# Patient Record
Sex: FEMALE | Race: BLACK OR AFRICAN AMERICAN | NOT HISPANIC OR LATINO | Employment: PART TIME | URBAN - METROPOLITAN AREA
[De-identification: names, ages, dates, MRNs, and addresses within clinical notes are randomized per-mention and may not be internally consistent; named-entity substitution may affect disease eponyms.]

---

## 2017-03-22 ENCOUNTER — OFFICE VISIT (OUTPATIENT)
Dept: INTERNAL MEDICINE | Facility: CLINIC | Age: 22
End: 2017-03-22
Payer: COMMERCIAL

## 2017-03-22 ENCOUNTER — LAB VISIT (OUTPATIENT)
Dept: LAB | Facility: HOSPITAL | Age: 22
End: 2017-03-22
Attending: FAMILY MEDICINE
Payer: COMMERCIAL

## 2017-03-22 VITALS
WEIGHT: 179.44 LBS | DIASTOLIC BLOOD PRESSURE: 82 MMHG | HEART RATE: 74 BPM | SYSTOLIC BLOOD PRESSURE: 120 MMHG | HEIGHT: 64 IN | BODY MASS INDEX: 30.63 KG/M2 | OXYGEN SATURATION: 99 %

## 2017-03-22 DIAGNOSIS — Z13.21 ENCOUNTER FOR VITAMIN DEFICIENCY SCREENING: ICD-10-CM

## 2017-03-22 DIAGNOSIS — R06.2 WHEEZING: ICD-10-CM

## 2017-03-22 DIAGNOSIS — H93.8X2 ABNORMAL EAR SENSATION, LEFT: ICD-10-CM

## 2017-03-22 DIAGNOSIS — L02.91 ABSCESS: ICD-10-CM

## 2017-03-22 DIAGNOSIS — Z00.00 ANNUAL PHYSICAL EXAM: Primary | ICD-10-CM

## 2017-03-22 DIAGNOSIS — Z11.3 SCREENING FOR STD (SEXUALLY TRANSMITTED DISEASE): ICD-10-CM

## 2017-03-22 DIAGNOSIS — L20.82 FLEXURAL ECZEMA: ICD-10-CM

## 2017-03-22 DIAGNOSIS — J45.20 ASTHMA, MILD INTERMITTENT, WELL-CONTROLLED: ICD-10-CM

## 2017-03-22 DIAGNOSIS — Z13.220 SCREENING, LIPID: ICD-10-CM

## 2017-03-22 DIAGNOSIS — Z00.00 ANNUAL PHYSICAL EXAM: ICD-10-CM

## 2017-03-22 LAB
25(OH)D3+25(OH)D2 SERPL-MCNC: 15 NG/ML
ALBUMIN SERPL BCP-MCNC: 3.6 G/DL
ALP SERPL-CCNC: 96 U/L
ALT SERPL W/O P-5'-P-CCNC: 13 U/L
ANION GAP SERPL CALC-SCNC: 10 MMOL/L
AST SERPL-CCNC: 15 U/L
BASOPHILS # BLD AUTO: 0.05 K/UL
BASOPHILS NFR BLD: 0.5 %
BILIRUB SERPL-MCNC: 0.3 MG/DL
BUN SERPL-MCNC: 12 MG/DL
CALCIUM SERPL-MCNC: 9.1 MG/DL
CHLORIDE SERPL-SCNC: 108 MMOL/L
CHOLEST/HDLC SERPL: 3.8 {RATIO}
CO2 SERPL-SCNC: 23 MMOL/L
CREAT SERPL-MCNC: 0.7 MG/DL
DIFFERENTIAL METHOD: ABNORMAL
EOSINOPHIL # BLD AUTO: 0.3 K/UL
EOSINOPHIL NFR BLD: 3.1 %
ERYTHROCYTE [DISTWIDTH] IN BLOOD BY AUTOMATED COUNT: 16.7 %
EST. GFR  (AFRICAN AMERICAN): >60 ML/MIN/1.73 M^2
EST. GFR  (NON AFRICAN AMERICAN): >60 ML/MIN/1.73 M^2
GLUCOSE SERPL-MCNC: 78 MG/DL
HCT VFR BLD AUTO: 36.7 %
HDL/CHOLESTEROL RATIO: 26.4 %
HDLC SERPL-MCNC: 140 MG/DL
HDLC SERPL-MCNC: 37 MG/DL
HGB BLD-MCNC: 11.6 G/DL
LDLC SERPL CALC-MCNC: 89 MG/DL
LYMPHOCYTES # BLD AUTO: 2.7 K/UL
LYMPHOCYTES NFR BLD: 27.2 %
MCH RBC QN AUTO: 23.6 PG
MCHC RBC AUTO-ENTMCNC: 31.6 %
MCV RBC AUTO: 75 FL
MONOCYTES # BLD AUTO: 0.4 K/UL
MONOCYTES NFR BLD: 4.4 %
NEUTROPHILS # BLD AUTO: 6.3 K/UL
NEUTROPHILS NFR BLD: 64.6 %
NONHDLC SERPL-MCNC: 103 MG/DL
PLATELET # BLD AUTO: 283 K/UL
PMV BLD AUTO: 12.1 FL
POTASSIUM SERPL-SCNC: 3.7 MMOL/L
PROT SERPL-MCNC: 7.9 G/DL
RBC # BLD AUTO: 4.92 M/UL
RPR SER QL: NORMAL
SODIUM SERPL-SCNC: 141 MMOL/L
TRIGL SERPL-MCNC: 70 MG/DL
TSH SERPL DL<=0.005 MIU/L-ACNC: 1 UIU/ML
WBC # BLD AUTO: 9.77 K/UL

## 2017-03-22 PROCEDURE — 86592 SYPHILIS TEST NON-TREP QUAL: CPT

## 2017-03-22 PROCEDURE — 86703 HIV-1/HIV-2 1 RESULT ANTBDY: CPT

## 2017-03-22 PROCEDURE — 84443 ASSAY THYROID STIM HORMONE: CPT

## 2017-03-22 PROCEDURE — 36415 COLL VENOUS BLD VENIPUNCTURE: CPT

## 2017-03-22 PROCEDURE — 80053 COMPREHEN METABOLIC PANEL: CPT

## 2017-03-22 PROCEDURE — 99999 PR PBB SHADOW E&M-EST. PATIENT-LVL V: CPT | Mod: PBBFAC,,, | Performed by: FAMILY MEDICINE

## 2017-03-22 PROCEDURE — 80061 LIPID PANEL: CPT

## 2017-03-22 PROCEDURE — 99395 PREV VISIT EST AGE 18-39: CPT | Mod: S$GLB,,, | Performed by: FAMILY MEDICINE

## 2017-03-22 PROCEDURE — 80074 ACUTE HEPATITIS PANEL: CPT

## 2017-03-22 PROCEDURE — 83036 HEMOGLOBIN GLYCOSYLATED A1C: CPT

## 2017-03-22 PROCEDURE — 82306 VITAMIN D 25 HYDROXY: CPT

## 2017-03-22 PROCEDURE — 85025 COMPLETE CBC W/AUTO DIFF WBC: CPT

## 2017-03-22 RX ORDER — ALBUTEROL SULFATE 90 UG/1
2 AEROSOL, METERED RESPIRATORY (INHALATION) EVERY 4 HOURS PRN
Qty: 1 INHALER | Refills: 0 | Status: SHIPPED | OUTPATIENT
Start: 2017-03-22 | End: 2021-01-04 | Stop reason: SDUPTHER

## 2017-03-22 RX ORDER — SULFAMETHOXAZOLE AND TRIMETHOPRIM 800; 160 MG/1; MG/1
1 TABLET ORAL 2 TIMES DAILY
Qty: 14 TABLET | Refills: 0 | Status: SHIPPED | OUTPATIENT
Start: 2017-03-22 | End: 2017-03-29

## 2017-03-22 NOTE — MR AVS SNAPSHOT
[facility]  [address]  [place] [address]  Phone: [phone]  Fax: [phone]                  Carline Iqbal   3/22/2017 9:00 AM   Office Visit    Description:  Female : 1995   Provider:  Jessica Anaya MD   Department:  [facility]           Reason for Visit     Abscess           Diagnoses this Visit        Comments    Annual physical exam    -  Primary     Wheezing         Asthma, mild intermittent, well-controlled         Flexural eczema         Screening for STD (sexually transmitted disease)         Screening, lipid         Encounter for vitamin deficiency screening         Abnormal ear sensation, left         Abscess                To Do List           Goals (5 Years of Data)     None      Follow-Up and Disposition     Return in about 1 year (around 3/22/2018), or if symptoms worsen or fail to improve.       These Medications        Disp Refills Start End    albuterol 90 mcg/actuation inhaler 1 Inhaler 0 3/22/2017     Inhale 2 puffs into the lungs every 4 (four) hours as needed for Wheezing. - Inhalation    Pharmacy: [place] [id number] Ph #: [phone]       sulfamethoxazole-trimethoprim 800-160mg (BACTRIM DS) 800-160 mg Tab 14 tablet 0 3/22/2017 3/29/2017    Take 1 tablet by mouth 2 (two) times daily. - Oral    Pharmacy: [place] [id number] Ph #: [phone]         [facility]     [facility] On [facility] Nurse Care Line -  Assistance  Registered nurses in the [facility] provide clinical advisement, health education, appointment booking, and other advisory services.  Call for this free service at [phone].             Medications           Message regarding Medications     Verify the changes and/or additions to your medication regime listed below are the same as discussed with your clinician today.  If any of these changes or additions are  "incorrect, please notify your healthcare provider.        START taking these NEW medications        Refills    sulfamethoxazole-trimethoprim 800-160mg (BACTRIM DS) 800-160 mg Tab 0    Sig: Take 1 tablet by mouth 2 (two) times daily.    Class: Normal    Route: Oral      STOP taking these medications     urea 20 % Crea Apply 1 application topically 3 (three) times daily.    hydrocodone-acetaminophen 5-325mg (NORCO) 5-325 mg per tablet Take 1 tablet by mouth every 6 (six) hours as needed for Pain.           Verify that the below list of medications is an accurate representation of the medications you are currently taking.  If none reported, the list may be blank. If incorrect, please contact your healthcare provider. Carry this list with you in case of emergency.           Current Medications     clobetasol (TEMOVATE) 0.05 % cream Apply topically 2 (two) times daily.    albuterol 90 mcg/actuation inhaler Inhale 2 puffs into the lungs every 4 (four) hours as needed for Wheezing.    sulfamethoxazole-trimethoprim 800-160mg (BACTRIM DS) 800-160 mg Tab Take 1 tablet by mouth 2 (two) times daily.           Clinical Reference Information           Your Vitals Were     BP Pulse Height Weight Last Period SpO2    120/82 74 5' 4" (1.626 m) 81.4 kg (179 lb 7.3 oz) 02/13/2017 (Approximate) 99%    BMI                30.8 kg/m2          Blood Pressure          Most Recent Value    BP  120/82      Allergies as of 3/22/2017     No Known Allergies      Immunizations Administered on Date of Encounter - 3/22/2017     None      Orders Placed During Today's Visit      Normal Orders This Visit    Ambulatory consult to ENT     Ambulatory consult to Gynecology     Ambulatory Referral to Dermatology     NEBULIZER FOR HOME USE     Future Labs/Procedures Expected by Expires    CBC auto differential  3/22/2017 5/21/2018    Comprehensive metabolic panel  3/22/2017 5/21/2018    Hepatitis panel, acute  3/22/2017 5/21/2018    HIV-1 and HIV-2 " antibodies  3/22/2017 5/21/2018    Lipid panel  3/22/2017 5/21/2018    RPR  3/22/2017 5/21/2018    TSH  3/22/2017 5/21/2018    Vitamin D  3/22/2017 5/21/2018    Hemoglobin A1c  2/20/2018 (Approximate) 5/21/2018    Complete PFT w/ bronchodilator  As directed 3/22/2018      Instructions             Language Assistance Services     ATTENTION: Language assistance services are available, free of charge. Please call [phone].      ATENCIÓN: Si habla español, tiene a rodriguez disposición servicios gratuitos de asistencia lingüística. Llame al [phone].     CHÚ Ý: N?u b?n nói Ti?ng Vi?t, có các d?ch v? h? tr? ngôn ng? mi?n phí dành cho b?n. G?i s? [phone].         [facility] complies with applicable Federal civil rights laws and does not discriminate on the basis of race, color, national origin, age, disability, or sex.

## 2017-03-22 NOTE — PROGRESS NOTES
"Subjective:       Patient ID: Carline Iqbal is a 22 y.o. female.    Chief Complaint: Abscess (left upper thigh)    HPI 21 y/o female with hx of pilonidal cyst s/p excision 5/2017, mild intermittent asthma, eczema, is here for annual exam.  She presents with L upper thigh lesion, has been there on and off for over a year, worse after shaving, sometimes gets bigger and drains whitish fluid.  No recent antibiotic use.  Denies f/n/v/d/constipation/cp/sob/urinary sx.  Sleeping ok, appetite ok, mood ok.  GYN: cycles have been irregular recently, never had a pap, would like gyn here  Asthma: uses albuterol mainly in winter, needs refill  Shots utd    Review of Systems   Constitutional: Negative for activity change, appetite change, fatigue and fever.   HENT: Negative for congestion, postnasal drip, rhinorrhea and sinus pressure.         L ear whooshing sound   Respiratory: Negative for cough and shortness of breath.    Cardiovascular: Negative for chest pain, palpitations and leg swelling.   Gastrointestinal: Negative for constipation, diarrhea, nausea and vomiting.   Genitourinary: Negative for difficulty urinating and dysuria.   Skin: Positive for wound. Negative for rash.   Neurological: Negative for dizziness and light-headedness.   Psychiatric/Behavioral: Negative for sleep disturbance.       Objective:      /82  Pulse 74  Ht 5' 4" (1.626 m)  Wt 81.4 kg (179 lb 7.3 oz)  LMP 02/13/2017 (Approximate)  SpO2 99%  BMI 30.8 kg/m2  Physical Exam   Constitutional: She appears well-developed and well-nourished.   HENT:   Head: Normocephalic and atraumatic.   Mouth/Throat: No oropharyngeal exudate.   Dull tm bilaterallay   Neck: Normal range of motion. Neck supple. No thyromegaly present.   Cardiovascular: Normal rate, regular rhythm and normal heart sounds.    Pulmonary/Chest: Effort normal and breath sounds normal. No respiratory distress.   Abdominal: Soft. Bowel sounds are normal. She exhibits no " distension. There is no tenderness.   Musculoskeletal: She exhibits no edema.   Lymphadenopathy:     She has no cervical adenopathy.   Neurological: She is alert.   Skin: Skin is warm and dry.   L upper thigh with small indurated lesion with surrounding hyperpigmentation, no tenderness or erythema   Psychiatric: She has a normal mood and affect.   Nursing note and vitals reviewed.      Assessment:       1. Annual physical exam    2. Wheezing    3. Asthma, mild intermittent, well-controlled    4. Flexural eczema    5. Screening for STD (sexually transmitted disease)    6. Screening, lipid    7. Encounter for vitamin deficiency screening    8. Abnormal ear sensation, left    9. Abscess        Plan:   Carline was seen today for abscess.    Diagnoses and all orders for this visit:    Annual physical exam  -     Hemoglobin A1c; Future  -     CBC auto differential; Future  -     Comprehensive metabolic panel; Future  -     TSH; Future  -     Ambulatory consult to Gynecology    Wheezing    Asthma, mild intermittent, well-controlled  -     albuterol 90 mcg/actuation inhaler; Inhale 2 puffs into the lungs every 4 (four) hours as needed for Wheezing.  -     NEBULIZER FOR HOME USE  -     Complete PFT w/ bronchodilator; Future    Flexural eczema    Screening for STD (sexually transmitted disease)  -     Cancel: C. trachomatis/N. gonorrhoeae by AMP DNA Urine  -     Hepatitis panel, acute; Future  -     HIV-1 and HIV-2 antibodies; Future  -     RPR; Future    Screening, lipid  -     Lipid panel; Future    Encounter for vitamin deficiency screening  -     Vitamin D; Future    Abnormal ear sensation, left  -     Ambulatory consult to ENT    Abscess  -     sulfamethoxazole-trimethoprim 800-160mg (BACTRIM DS) 800-160 mg Tab; Take 1 tablet by mouth 2 (two) times daily.  -     Ambulatory Referral to Dermatology

## 2017-03-23 LAB
ESTIMATED AVG GLUCOSE: 105 MG/DL
HAV IGM SERPL QL IA: NEGATIVE
HBA1C MFR BLD HPLC: 5.3 %
HBV CORE IGM SERPL QL IA: NEGATIVE
HBV SURFACE AG SERPL QL IA: NEGATIVE
HCV AB SERPL QL IA: NEGATIVE
HIV 1+2 AB+HIV1 P24 AG SERPL QL IA: NEGATIVE

## 2017-03-25 ENCOUNTER — TELEPHONE (OUTPATIENT)
Dept: INTERNAL MEDICINE | Facility: CLINIC | Age: 22
End: 2017-03-25

## 2017-03-25 DIAGNOSIS — D64.9 ANEMIA, UNSPECIFIED TYPE: Primary | ICD-10-CM

## 2017-03-27 ENCOUNTER — HOSPITAL ENCOUNTER (OUTPATIENT)
Dept: PULMONOLOGY | Facility: CLINIC | Age: 22
Discharge: HOME | End: 2017-03-27
Payer: COMMERCIAL

## 2017-03-27 DIAGNOSIS — J45.20 ASTHMA, MILD INTERMITTENT, WELL-CONTROLLED: ICD-10-CM

## 2017-03-27 LAB
POST FEV1 FVC: 0.85
POST FEV1: 2.66
POST FVC: 3.12
PRE FEV1 FVC: 82
PRE FEV1: 2.5
PRE FVC: 3.04
PREDICTED FEV1 FVC: 87
PREDICTED FEV1: 3.16
PREDICTED FVC: 3.65

## 2017-03-27 PROCEDURE — 94060 EVALUATION OF WHEEZING: CPT | Mod: S$GLB,,, | Performed by: INTERNAL MEDICINE

## 2017-03-27 PROCEDURE — 94729 DIFFUSING CAPACITY: CPT | Mod: S$GLB,,, | Performed by: INTERNAL MEDICINE

## 2017-04-01 ENCOUNTER — PATIENT MESSAGE (OUTPATIENT)
Dept: INTERNAL MEDICINE | Facility: CLINIC | Age: 22
End: 2017-04-01

## 2017-04-04 ENCOUNTER — TELEPHONE (OUTPATIENT)
Dept: INTERNAL MEDICINE | Facility: CLINIC | Age: 22
End: 2017-04-04

## 2017-04-04 DIAGNOSIS — Z11.3 SCREENING FOR STD (SEXUALLY TRANSMITTED DISEASE): Primary | ICD-10-CM

## 2017-04-05 ENCOUNTER — OFFICE VISIT (OUTPATIENT)
Dept: OBSTETRICS AND GYNECOLOGY | Facility: CLINIC | Age: 22
End: 2017-04-05
Payer: COMMERCIAL

## 2017-04-05 VITALS
SYSTOLIC BLOOD PRESSURE: 132 MMHG | WEIGHT: 177.94 LBS | HEIGHT: 64 IN | DIASTOLIC BLOOD PRESSURE: 84 MMHG | BODY MASS INDEX: 30.38 KG/M2

## 2017-04-05 DIAGNOSIS — Z01.419 ENCOUNTER FOR GYNECOLOGICAL EXAMINATION WITHOUT ABNORMAL FINDING: Primary | ICD-10-CM

## 2017-04-05 DIAGNOSIS — Z12.4 PAP SMEAR FOR CERVICAL CANCER SCREENING: ICD-10-CM

## 2017-04-05 DIAGNOSIS — Z30.09 GENERAL COUNSELING AND ADVICE ON FEMALE CONTRACEPTION: ICD-10-CM

## 2017-04-05 DIAGNOSIS — Z11.3 VENEREAL DISEASE SCREENING: ICD-10-CM

## 2017-04-05 DIAGNOSIS — L30.9 ECZEMA: ICD-10-CM

## 2017-04-05 LAB
CANDIDA RRNA VAG QL PROBE: NEGATIVE
G VAGINALIS RRNA GENITAL QL PROBE: POSITIVE
T VAGINALIS RRNA GENITAL QL PROBE: NEGATIVE

## 2017-04-05 PROCEDURE — 99999 PR PBB SHADOW E&M-EST. PATIENT-LVL III: CPT | Mod: PBBFAC,,, | Performed by: OBSTETRICS & GYNECOLOGY

## 2017-04-05 PROCEDURE — 99385 PREV VISIT NEW AGE 18-39: CPT | Mod: S$GLB,,, | Performed by: OBSTETRICS & GYNECOLOGY

## 2017-04-05 PROCEDURE — 87591 N.GONORRHOEAE DNA AMP PROB: CPT

## 2017-04-05 PROCEDURE — 88175 CYTOPATH C/V AUTO FLUID REDO: CPT

## 2017-04-05 PROCEDURE — 87480 CANDIDA DNA DIR PROBE: CPT

## 2017-04-05 RX ORDER — CLOBETASOL PROPIONATE 0.5 MG/G
CREAM TOPICAL
Refills: 0 | OUTPATIENT
Start: 2017-04-05

## 2017-04-05 NOTE — PROGRESS NOTES
Subjective:       Patient ID: Carline Iqbal is a 22 y.o. female.    Chief Complaint:  Well Woman      History of Present Illness  HPI  Carline Iqbal is a 22 y.o. female  NEW TO ME here for her FIRST GYN exam.  She reports that she did have the Gardasil vaccine at age 11.  She describes her periods as regular, normal flow, lasting 5 days.   Denies break through bleeding.   Denies vaginal itching or irritation.  Reports vaginal discharge.  She is sexually active. She has had 2 partners during the past year; only became sexually active at age 21;and(has been with one partner since 2016) .  She uses Withdrawal for contraception.   History of abnormal pap: No  Last Pap: patient has never had a pap test  Last MMG: None  Last Colonoscopy:  None  Denies domestic violence. She does feel safe at home.     Past Medical History:   Diagnosis Date    Pilonidal cyst 2016     Past Surgical History:   Procedure Laterality Date    pilonidal cyst removal      TYMPANOSTOMY TUBE PLACEMENT Bilateral      Social History     Social History    Marital status: Single     Spouse name: N/A    Number of children: N/A    Years of education: N/A     Occupational History    [employer]      Social History Main Topics    Smoking status: Never Smoker    Smokeless tobacco: Never Used    Alcohol use 0.0 oz/week     0 Standard drinks or equivalent per week      Comment: seldomly    Drug use: No    Sexual activity: Yes     Partners: Male     Birth control/ protection: None, Coitus interruptus      Comment: Sexually active since age 21, current partner since 2016     Other Topics Concern    Not on file     Social History Narrative     Family History   Problem Relation Age of Onset    Diabetes Maternal Grandmother     Breast cancer Maternal Grandmother 28    Stroke Maternal Grandfather     Hypertension Mother     Anesthesia problems Neg Hx     Colon cancer Neg Hx     Ovarian cancer Neg Hx   "    OB History      Para Term  AB TAB SAB Ectopic Multiple Living    0 0 0 0 0 0 0 0 0 0          /84  Ht 5' 4" (1.626 m)  Wt 80.7 kg (177 lb 14.6 oz)  LMP 2017  BMI 30.54 kg/m2        GYN & OB History  Patient's last menstrual period was 2017.   Date of Last Pap: No result found    OB History    Para Term  AB SAB TAB Ectopic Multiple Living   0 0 0 0 0 0 0 0 0 0             Review of Systems  Review of Systems   Constitutional: Negative for activity change, appetite change, fatigue and unexpected weight change.   HENT: Negative.    Eyes: Negative for visual disturbance.   Respiratory: Negative for shortness of breath and wheezing.    Cardiovascular: Negative for chest pain, palpitations and leg swelling.   Gastrointestinal: Negative for abdominal pain, bloating and blood in stool.   Endocrine: Negative for diabetes and hair loss.   Genitourinary: Positive for vaginal discharge. Negative for decreased libido, dyspareunia, menorrhagia and menstrual problem.   Musculoskeletal: Negative for back pain and joint swelling.   Skin:  Negative for no acne and hair changes.   Neurological: Negative for headaches.   Hematological: Does not bruise/bleed easily.   Psychiatric/Behavioral: Negative for depression and sleep disturbance. The patient is not nervous/anxious.    Breast: Negative for breast pain and nipple discharge          Objective:    Physical Exam:   Constitutional: She is oriented to person, place, and time. She appears well-developed and well-nourished.    HENT:   Head: Normocephalic and atraumatic.    Eyes: EOM are normal. Pupils are equal, round, and reactive to light.    Neck: Normal range of motion. Neck supple.    Cardiovascular: Normal rate and regular rhythm.     Pulmonary/Chest: Effort normal and breath sounds normal.   BREASTS: Symmetrical, no skin changes or visible lesions.  No palpable masses, nipple discharge bilaterally.          Abdominal: Soft. " Bowel sounds are normal.     Genitourinary: Vagina normal. Pelvic exam was performed with patient supine.   Genitourinary Comments: PELVIC: Normal external genitalia without lesions.  Normal hair distribution.  Adequate perineal body, normal urethral meatus.  Vagina moist and well rugated without lesions or discharge.  Cervix pink, without lesions, discharge or tenderness.  No significant cystocele or rectocele.  Bimanual exam shows uterus to be normal size, regular, mobile and nontender.  Adnexa without masses or tenderness.               Musculoskeletal: Normal range of motion and moves all extremeties.       Neurological: She is alert and oriented to person, place, and time.    Skin: Skin is warm and dry.    Psychiatric: She has a normal mood and affect.          Assessment:        1. Encounter for gynecological examination without abnormal finding    2. Pap smear for cervical cancer screening    3. Venereal disease screening    4. General counseling and advice on female contraception                Plan:      1. Encounter for gynecological examination without abnormal finding  COUNSELING:  The patient was counseled today on regular weight bearing exercise. The patient was also counseled today on ACS PAP guidelines, with recommendations for yearly pelvic exams unless their uterus, cervix, and ovaries were removed for benign reasons; in that case, examinations every 3-5 years are recommended. The patient was also counseled regarding monthly breast self-examination, routine STD screening for at-risk populations, prophylactic immunizations for transmitted infections such as HPV, Pertussis, or Influenza as appropriate, and yearly mammograms when indicated by ACS guidelines. She was advised to see her primary care physician for all other health maintenance.   FOLLOW-UP with me for next routine visit.         2. Pap smear for cervical cancer screening    - Liquid-based pap smear, screening    3. Venereal disease  screening    - C. trachomatis/N. gonorrhoeae by AMP DNA Cervix  - Vaginosis Screen by DNA Probe    4. General counseling and advice on female contraception  Patient was counseled today on contraceptive options--Pills, Depo-Provera, IUD, Nexplanon, & Nuva Ring. We discussed the advantages and disadvantages of each. We discussed the continued use of condoms to prevent STDs. The patient elected to use ?uncertain, will call back with a decision. The session lasted approximately 25 minutes, greater than 50% was counseling. All her questions were answered.         Return in about 1 year (around 4/5/2018).

## 2017-04-05 NOTE — LETTER
April 5, 2017      Jessica Anaya MD  [address]  [place] [address]           [facility] 5th [facility]  [address]  [place] [address]  Phone: [phone]          Patient: Carline Iqbal   MR Number: [id number]   YOB: 1995   Date of Visit: 4/5/2017       Dear Dr. Jessica Anaya:    Thank you for referring Carline Iqbal to me for evaluation. Attached you will find relevant portions of my assessment and plan of care.    If you have questions, please do not hesitate to call me. I look forward to following Carline Iqbal along with you.    Sincerely,    Roro Sanders MD    Enclosure  CC:  No Recipients    If you would like to receive this communication electronically, please contact [email or web address] or [phone] to request more information on [facility] access.    For providers and/or their staff who would like to refer a patient to [facility], please contact us through our one-stop-shop provider referral line, [facility], at [phone].    If you feel you have received this communication in error or would no longer like to receive these types of communications, please e-mail [email or web address]

## 2017-04-06 ENCOUNTER — TELEPHONE (OUTPATIENT)
Dept: OBSTETRICS AND GYNECOLOGY | Facility: CLINIC | Age: 22
End: 2017-04-06

## 2017-04-06 ENCOUNTER — PATIENT MESSAGE (OUTPATIENT)
Dept: OBSTETRICS AND GYNECOLOGY | Facility: CLINIC | Age: 22
End: 2017-04-06

## 2017-04-06 DIAGNOSIS — B96.89 BACTERIAL VAGINOSIS: ICD-10-CM

## 2017-04-06 DIAGNOSIS — L30.9 ECZEMA: ICD-10-CM

## 2017-04-06 DIAGNOSIS — N76.0 BACTERIAL VAGINOSIS: ICD-10-CM

## 2017-04-06 DIAGNOSIS — A74.9 CHLAMYDIA: Primary | ICD-10-CM

## 2017-04-06 LAB
C TRACH DNA SPEC QL NAA+PROBE: DETECTED
N GONORRHOEA DNA SPEC QL NAA+PROBE: NOT DETECTED

## 2017-04-06 RX ORDER — CLOBETASOL PROPIONATE 0.5 MG/G
CREAM TOPICAL 2 TIMES DAILY
Qty: 60 G | Refills: 0 | Status: SHIPPED | OUTPATIENT
Start: 2017-04-06 | End: 2018-08-27 | Stop reason: SDUPTHER

## 2017-04-06 RX ORDER — AZITHROMYCIN 500 MG/1
1000 TABLET, FILM COATED ORAL DAILY
Qty: 2 TABLET | Refills: 0 | Status: SHIPPED | OUTPATIENT
Start: 2017-04-06 | End: 2017-04-07

## 2017-04-06 RX ORDER — METRONIDAZOLE 500 MG/1
500 TABLET ORAL 2 TIMES DAILY WITH MEALS
Qty: 10 TABLET | Refills: 0 | Status: SHIPPED | OUTPATIENT
Start: 2017-04-06 | End: 2017-04-11

## 2017-04-06 NOTE — TELEPHONE ENCOUNTER
"Attempted to reach patient re: + Chlamydia and + BV.   "Voicemail box not set up yet"    Will send information Re;on patient portal.  "

## 2017-04-07 ENCOUNTER — PATIENT MESSAGE (OUTPATIENT)
Dept: OBSTETRICS AND GYNECOLOGY | Facility: CLINIC | Age: 22
End: 2017-04-07

## 2017-04-08 ENCOUNTER — PATIENT MESSAGE (OUTPATIENT)
Dept: OBSTETRICS AND GYNECOLOGY | Facility: CLINIC | Age: 22
End: 2017-04-08

## 2017-04-10 ENCOUNTER — TELEPHONE (OUTPATIENT)
Dept: OBSTETRICS AND GYNECOLOGY | Facility: CLINIC | Age: 22
End: 2017-04-10

## 2017-04-10 NOTE — TELEPHONE ENCOUNTER
"Called in prescription for Zithromax 500 MG tablets (disp: 2, refills: 0 with instructions: "Take 2 tablets (1,000 mg total) by mouth once daily.")  to pt's partner's [facility] in [place], address: [address] [place] [address] or [place] [address]  Phone number: [phone]. Called pt, no answer. Sent pt message through [facility] requesting a call back.  "

## 2017-04-13 ENCOUNTER — TELEPHONE (OUTPATIENT)
Dept: OBSTETRICS AND GYNECOLOGY | Facility: CLINIC | Age: 22
End: 2017-04-13

## 2017-04-13 NOTE — TELEPHONE ENCOUNTER
Scheduled repeat chlamydia testing appt for pt 2 months post zithromax treatment. Appt was made for 6/5/2017 at 8:30 AM at [facility]. Pt communicated understanding.

## 2017-05-09 ENCOUNTER — OFFICE VISIT (OUTPATIENT)
Dept: INTERNAL MEDICINE | Facility: CLINIC | Age: 22
End: 2017-05-09
Payer: COMMERCIAL

## 2017-05-09 VITALS
BODY MASS INDEX: 30.05 KG/M2 | OXYGEN SATURATION: 98 % | WEIGHT: 176 LBS | TEMPERATURE: 99 F | HEIGHT: 64 IN | SYSTOLIC BLOOD PRESSURE: 100 MMHG | HEART RATE: 58 BPM | DIASTOLIC BLOOD PRESSURE: 80 MMHG

## 2017-05-09 DIAGNOSIS — N76.0 ACUTE VAGINITIS: Primary | ICD-10-CM

## 2017-05-09 PROCEDURE — 1160F RVW MEDS BY RX/DR IN RCRD: CPT | Mod: S$GLB,,, | Performed by: INTERNAL MEDICINE

## 2017-05-09 PROCEDURE — 99999 PR PBB SHADOW E&M-EST. PATIENT-LVL III: CPT | Mod: PBBFAC,,, | Performed by: INTERNAL MEDICINE

## 2017-05-09 PROCEDURE — 87591 N.GONORRHOEAE DNA AMP PROB: CPT

## 2017-05-09 PROCEDURE — 99213 OFFICE O/P EST LOW 20 MIN: CPT | Mod: S$GLB,,, | Performed by: INTERNAL MEDICINE

## 2017-05-09 RX ORDER — METRONIDAZOLE 500 MG/1
TABLET ORAL
Qty: 14 TABLET | Refills: 0 | Status: SHIPPED | OUTPATIENT
Start: 2017-05-09 | End: 2017-06-05

## 2017-05-09 NOTE — PROGRESS NOTES
Subjective:       Patient ID: Carline Iqbal is a 22 y.o. female.    Chief Complaint: Vaginal Discharge (milky white discharge x 2 days burning)    HPI   Milky white discharge x 2 days.     Similar to BV that she had seen in past.  Just saw Dr Sanders in April tx with flagyl 500 mg bid.  Chlamydia also detected, tx with azithromycin.   No new sexual partners.   LMP  April 23-29..    Review of Systems   Constitutional: Negative for fever.   Gastrointestinal: Negative for abdominal distention, abdominal pain, nausea and vomiting.   Genitourinary: Positive for vaginal discharge. Negative for vaginal bleeding and vaginal pain.       Objective:      Physical Exam   Constitutional: She is oriented to person, place, and time. She appears well-developed.   Neurological: She is alert and oriented to person, place, and time.   Psychiatric: She has a normal mood and affect. Her behavior is normal.       Assessment:       1. Acute vaginitis                    - recurrent.  Recently tx for chlamydia and BV  Plan:       Carline was seen today for vaginal discharge.    Diagnoses and all orders for this visit:    Acute vaginitis  -     C. trachomatis/N. gonorrhoeae by AMP DNA Urine  -     metronidazole (FLAGYL) 500 MG tablet; 1 tab po bid       Encouraged to start birth control      Avoid EtOH while taking flagyl.

## 2017-05-09 NOTE — MR AVS SNAPSHOT
[facility]  [address]  [facility] [address]  Phone: [phone]  Fax: [phone]                  Carline Iqbal   2017 8:40 AM   Office Visit    Description:  Female : 1995   Provider:  Erin Smiley MD   Department:  [facility]           Reason for Visit     Vaginal Discharge           Diagnoses this Visit        Comments    Acute vaginitis    -  Primary            To Do List           Future Appointments        Provider Department Dept Phone    2017 8:30 AM Roro Sanders MD [facility] - OB/GYN [address] [phone]      Goals (5 Years of Data)     None       These Medications        Disp Refills Start End    metronidazole (FLAGYL) 500 MG tablet 14 tablet 0 2017     1 tab po bid    Pharmacy: [address] #: [phone]         [facility]     [facility] On Call Nurse Care Line -  Assistance  Unless otherwise directed by your provider, please contact [facility], our nurse care line that is available for  assistance.     Registered nurses in the [facility] provide: appointment scheduling, clinical advisement, health education, and other advisory services.  Call: [phone] (toll free)               Medications           Message regarding Medications     Verify the changes and/or additions to your medication regime listed below are the same as discussed with your clinician today.  If any of these changes or additions are incorrect, please notify your healthcare provider.        START taking these NEW medications        Refills    metronidazole (FLAGYL) 500 MG tablet 0    Si tab po bid    Class: Normal           Verify that the below list of medications is an accurate representation of the medications you are currently taking.  If none reported, the list may be blank. If incorrect, please contact your healthcare provider. Carry this list with you  "in case of emergency.           Current Medications     albuterol 90 mcg/actuation inhaler Inhale 2 puffs into the lungs every 4 (four) hours as needed for Wheezing.    clobetasol (TEMOVATE) 0.05 % cream Apply topically 2 (two) times daily.    metronidazole (FLAGYL) 500 MG tablet 1 tab po bid           Clinical Reference Information           Your Vitals Were     BP Pulse Temp Height Weight SpO2    100/80 (BP Location: Left arm, Patient Position: Sitting, BP Method: Manual) 58 98.7 °F (37.1 °C) (Oral) 5' 4" (1.626 m) 79.8 kg (176 lb) 98%    BMI                30.21 kg/m2          Blood Pressure          Most Recent Value    BP  100/80      Allergies as of 5/9/2017     No Known Allergies      Immunizations Administered on Date of Encounter - 5/9/2017     None      Orders Placed During Today's Visit      Normal Orders This Visit    C. trachomatis/N. gonorrhoeae by AMP DNA Urine       Language Assistance Services     ATTENTION: Language assistance services are available, free of charge. Please call [phone].      ATENCIÓN: Si habla español, tiene a rodriguez disposición servicios gratuitos de asistencia lingüística. Llame al [phone].     EUNICE Ý: N?u b?n nói Ti?ng Vi?t, có các d?ch v? h? tr? ngôn ng? mi?n phí dành cho b?n. G?i s? [phone].         [facility] complies with applicable Federal civil rights laws and does not discriminate on the basis of race, color, national origin, age, disability, or sex.        "

## 2017-05-10 ENCOUNTER — PATIENT MESSAGE (OUTPATIENT)
Dept: OBSTETRICS AND GYNECOLOGY | Facility: CLINIC | Age: 22
End: 2017-05-10

## 2017-05-10 LAB
C TRACH DNA SPEC QL NAA+PROBE: NOT DETECTED
N GONORRHOEA DNA SPEC QL NAA+PROBE: NOT DETECTED

## 2017-06-05 ENCOUNTER — OFFICE VISIT (OUTPATIENT)
Dept: OBSTETRICS AND GYNECOLOGY | Facility: CLINIC | Age: 22
End: 2017-06-05
Attending: OBSTETRICS & GYNECOLOGY
Payer: COMMERCIAL

## 2017-06-05 VITALS
HEIGHT: 64 IN | SYSTOLIC BLOOD PRESSURE: 114 MMHG | WEIGHT: 169.31 LBS | BODY MASS INDEX: 28.91 KG/M2 | DIASTOLIC BLOOD PRESSURE: 78 MMHG

## 2017-06-05 DIAGNOSIS — Z11.3 VENEREAL DISEASE SCREENING: Primary | ICD-10-CM

## 2017-06-05 LAB
C TRACH DNA SPEC QL NAA+PROBE: NOT DETECTED
N GONORRHOEA DNA SPEC QL NAA+PROBE: NOT DETECTED

## 2017-06-05 PROCEDURE — 99213 OFFICE O/P EST LOW 20 MIN: CPT | Mod: S$GLB,,, | Performed by: OBSTETRICS & GYNECOLOGY

## 2017-06-05 PROCEDURE — 87591 N.GONORRHOEAE DNA AMP PROB: CPT

## 2017-06-05 PROCEDURE — 99999 PR PBB SHADOW E&M-EST. PATIENT-LVL II: CPT | Mod: PBBFAC,,, | Performed by: OBSTETRICS & GYNECOLOGY

## 2017-06-05 NOTE — PROGRESS NOTES
Carline Iqbal is a 22 y.o. female patient  who presents today for follow up test after being treated for chlamydia several months ago. She states that her partner was also treated about one week after her treatment and that they refrained from unprotected sex for about a month following treatment.   She denies pain or discharge at this time.  Patient's last menstrual period was 2017 (exact date).    Past Medical History:   Diagnosis Date    Pilonidal cyst 2016     Past Surgical History:   Procedure Laterality Date    pilonidal cyst removal      TYMPANOSTOMY TUBE PLACEMENT Bilateral      Social History     Social History    Marital status: Single     Spouse name: N/A    Number of children: N/A    Years of education: N/A     Occupational History    [employer]      Social History Main Topics    Smoking status: Never Smoker    Smokeless tobacco: Never Used    Alcohol use 0.0 oz/week      Comment: seldomly    Drug use: No    Sexual activity: Yes     Partners: Male     Birth control/ protection: None, Coitus interruptus      Comment: Sexually active since age 21, current partner since 2016     Other Topics Concern    Not on file     Social History Narrative    No narrative on file     Family History   Problem Relation Age of Onset    Diabetes Maternal Grandmother     Breast cancer Maternal Grandmother 28    Stroke Maternal Grandfather     Hypertension Mother     Anesthesia problems Neg Hx     Colon cancer Neg Hx     Ovarian cancer Neg Hx      OB History      Para Term  AB Living    0 0 0 0 0 0    SAB TAB Ectopic Multiple Live Births    0 0 0 0                 ROS:  GENERAL: Feeling well overall.   SKIN: Denies rash or lesions.   HEAD: Denies head injury or headache.   NODES: Denies enlarged lymph nodes.   CHEST: Denies chest pain or shortness of breath.   CARDIOVASCULAR: Denies palpitations or left sided chest pain.   ABDOMEN: No abdominal pain, nausea,  "vomiting or rectal bleeding.   URINARY: No dysuria, hematuria, or burning on urination.  REPRODUCTIVE: See HPI.   BREASTS: Denies pain, lumps, or nipple discharge.   HEMATOLOGIC: No easy bruisability or excessive bleeding.   MUSCULOSKELETAL: Denies joint pain or swelling.   NEUROLOGIC: Denies syncope or weakness.   PSYCHIATRIC: Denies depression.    /78   Ht 5' 4" (1.626 m)   Wt 76.8 kg (169 lb 5 oz)   LMP 05/27/2017 (Exact Date)   BMI 29.06 kg/m²     PE:   APPEARANCE: Well nourished, well developed, in no acute distress.  ABDOMEN: Soft. No tenderness or masses. No hernias. No CVA tenderness.  VULVA: No lesions. Normal female genitalia.  URETHRAL MEATUS: Normal size and location, no lesions, no prolapse.  URETHRA: No masses, tenderness, prolapse or scarring.  VAGINA: Moist and well rugated, no discharge, no significant cystocele or rectocele.  CERVIX: No lesions and discharge.   UTERUS: Normal size, regular shape, mobile, non-tender, bladder base nontender.  ADNEXA: No masses, tenderness or CDS nodularity.  ANUS PERINEUM: Normal.    PROCEDURES:    1. Venereal disease screening  C. trachomatis/N. gonorrhoeae by AMP DNA Cervix    AND PLAN:      Return in about 1 year (around 6/5/2018).    "

## 2017-06-08 ENCOUNTER — PATIENT MESSAGE (OUTPATIENT)
Dept: OBSTETRICS AND GYNECOLOGY | Facility: CLINIC | Age: 22
End: 2017-06-08

## 2017-08-15 ENCOUNTER — PATIENT MESSAGE (OUTPATIENT)
Dept: INTERNAL MEDICINE | Facility: CLINIC | Age: 22
End: 2017-08-15

## 2017-12-07 ENCOUNTER — OFFICE VISIT (OUTPATIENT)
Dept: INTERNAL MEDICINE | Facility: CLINIC | Age: 22
End: 2017-12-07
Payer: MEDICAID

## 2017-12-07 ENCOUNTER — LAB VISIT (OUTPATIENT)
Dept: LAB | Facility: HOSPITAL | Age: 22
End: 2017-12-07
Attending: FAMILY MEDICINE
Payer: MEDICAID

## 2017-12-07 VITALS
HEART RATE: 96 BPM | OXYGEN SATURATION: 100 % | BODY MASS INDEX: 29.62 KG/M2 | HEIGHT: 64 IN | SYSTOLIC BLOOD PRESSURE: 124 MMHG | WEIGHT: 173.5 LBS | DIASTOLIC BLOOD PRESSURE: 82 MMHG

## 2017-12-07 DIAGNOSIS — N89.8 VAGINAL DISCHARGE: Primary | ICD-10-CM

## 2017-12-07 DIAGNOSIS — Z11.3 SCREENING EXAMINATION FOR STD (SEXUALLY TRANSMITTED DISEASE): ICD-10-CM

## 2017-12-07 DIAGNOSIS — D64.9 ANEMIA, UNSPECIFIED TYPE: ICD-10-CM

## 2017-12-07 DIAGNOSIS — Z11.3 SCREENING FOR STD (SEXUALLY TRANSMITTED DISEASE): ICD-10-CM

## 2017-12-07 LAB
BASOPHILS # BLD AUTO: 0.06 K/UL
BASOPHILS NFR BLD: 0.7 %
C TRACH DNA SPEC QL NAA+PROBE: NOT DETECTED
DIFFERENTIAL METHOD: ABNORMAL
EOSINOPHIL # BLD AUTO: 0.2 K/UL
EOSINOPHIL NFR BLD: 2.4 %
ERYTHROCYTE [DISTWIDTH] IN BLOOD BY AUTOMATED COUNT: 17.1 %
FERRITIN SERPL-MCNC: 20 NG/ML
FOLATE SERPL-MCNC: 7.6 NG/ML
HAV IGM SERPL QL IA: NEGATIVE
HBV CORE IGM SERPL QL IA: NEGATIVE
HBV SURFACE AG SERPL QL IA: NEGATIVE
HCT VFR BLD AUTO: 39.1 %
HCV AB SERPL QL IA: NEGATIVE
HGB BLD-MCNC: 12 G/DL
HIV 1+2 AB+HIV1 P24 AG SERPL QL IA: NEGATIVE
IMM GRANULOCYTES # BLD AUTO: 0.04 K/UL
IMM GRANULOCYTES NFR BLD AUTO: 0.5 %
IRON SERPL-MCNC: 39 UG/DL
LYMPHOCYTES # BLD AUTO: 2.3 K/UL
LYMPHOCYTES NFR BLD: 25.7 %
MCH RBC QN AUTO: 23.5 PG
MCHC RBC AUTO-ENTMCNC: 30.7 G/DL
MCV RBC AUTO: 77 FL
MONOCYTES # BLD AUTO: 0.4 K/UL
MONOCYTES NFR BLD: 4 %
N GONORRHOEA DNA SPEC QL NAA+PROBE: NOT DETECTED
NEUTROPHILS # BLD AUTO: 5.8 K/UL
NEUTROPHILS NFR BLD: 66.7 %
NRBC BLD-RTO: 0 /100 WBC
PLATELET # BLD AUTO: 274 K/UL
PMV BLD AUTO: 12.7 FL
RBC # BLD AUTO: 5.11 M/UL
RETICS/RBC NFR AUTO: 1.5 %
SATURATED IRON: 8 %
TOTAL IRON BINDING CAPACITY: 475 UG/DL
TRANSFERRIN SERPL-MCNC: 321 MG/DL
VIT B12 SERPL-MCNC: 787 PG/ML
WBC # BLD AUTO: 8.74 K/UL

## 2017-12-07 PROCEDURE — 86694 HERPES SIMPLEX NES ANTBDY: CPT | Mod: 59

## 2017-12-07 PROCEDURE — 86694 HERPES SIMPLEX NES ANTBDY: CPT

## 2017-12-07 PROCEDURE — 82728 ASSAY OF FERRITIN: CPT

## 2017-12-07 PROCEDURE — 87591 N.GONORRHOEAE DNA AMP PROB: CPT

## 2017-12-07 PROCEDURE — 99213 OFFICE O/P EST LOW 20 MIN: CPT | Mod: PBBFAC,PN | Performed by: FAMILY MEDICINE

## 2017-12-07 PROCEDURE — 87660 TRICHOMONAS VAGIN DIR PROBE: CPT

## 2017-12-07 PROCEDURE — 86703 HIV-1/HIV-2 1 RESULT ANTBDY: CPT

## 2017-12-07 PROCEDURE — 82607 VITAMIN B-12: CPT

## 2017-12-07 PROCEDURE — 83540 ASSAY OF IRON: CPT

## 2017-12-07 PROCEDURE — 85045 AUTOMATED RETICULOCYTE COUNT: CPT

## 2017-12-07 PROCEDURE — 99214 OFFICE O/P EST MOD 30 MIN: CPT | Mod: S$PBB,,, | Performed by: FAMILY MEDICINE

## 2017-12-07 PROCEDURE — 80074 ACUTE HEPATITIS PANEL: CPT

## 2017-12-07 PROCEDURE — 87480 CANDIDA DNA DIR PROBE: CPT

## 2017-12-07 PROCEDURE — 99999 PR PBB SHADOW E&M-EST. PATIENT-LVL III: CPT | Mod: PBBFAC,,, | Performed by: FAMILY MEDICINE

## 2017-12-07 PROCEDURE — 86592 SYPHILIS TEST NON-TREP QUAL: CPT

## 2017-12-07 PROCEDURE — 85025 COMPLETE CBC W/AUTO DIFF WBC: CPT

## 2017-12-07 PROCEDURE — 82746 ASSAY OF FOLIC ACID SERUM: CPT

## 2017-12-07 NOTE — PROGRESS NOTES
"Subjective:       Patient ID: Carline Iqabl is a 22 y.o. female.    Chief Complaint: Follow-up    HPI   23 y/o female with hx of pilonidal cyst s/p excision 5/2017, mild intermittent asthma, eczema, is here for STD check, she had one partner but hey have just broken up.   She was told she needed to be retested for HSV, she has never had a vaginal outbreak of lesions or any oral lesions, she does have a hx of BV, she currently has mild vaginal discharge clear, no itching or burning, denies f/n/v/d/constipation/urinary sx.    Review of Systems   Constitutional: Negative for activity change, appetite change, fatigue and fever.   Gastrointestinal: Negative for constipation, diarrhea, nausea and vomiting.   Genitourinary: Positive for vaginal discharge. Negative for difficulty urinating, dysuria, frequency, genital sores, hematuria, vaginal bleeding and vaginal pain.   Skin: Positive for wound. Negative for rash.   Neurological: Negative for dizziness and light-headedness.   Psychiatric/Behavioral: Negative for sleep disturbance.       Objective:      /82   Pulse 96   Ht 5' 4" (1.626 m)   Wt 78.7 kg (173 lb 8 oz)   LMP 12/02/2017   SpO2 100%   BMI 29.78 kg/m²   Physical Exam   Constitutional: She appears well-developed and well-nourished.   HENT:   Head: Normocephalic and atraumatic.   Mouth/Throat: No oropharyngeal exudate.   Neck: Normal range of motion. Neck supple. No thyromegaly present.   Cardiovascular: Normal rate, regular rhythm and normal heart sounds.    Pulmonary/Chest: Effort normal and breath sounds normal. No respiratory distress.   Abdominal: Soft. Bowel sounds are normal. She exhibits no distension. There is no tenderness.   Musculoskeletal: She exhibits no edema.   Lymphadenopathy:     She has no cervical adenopathy.   Neurological: She is alert.   Skin: Skin is warm and dry.   Psychiatric: She has a normal mood and affect.   Nursing note and vitals reviewed.      Assessment:       1. " Vaginal discharge    2. Screening examination for STD (sexually transmitted disease)        Plan:   Carline was seen today for follow-up.    Diagnoses and all orders for this visit:    Vaginal discharge  -     Vaginosis Screen by DNA Probe    Screening examination for STD (sexually transmitted disease)  -     Vaginosis Screen by DNA Probe  -     RPR; Future  -     HIV-1 and HIV-2 antibodies; Future  -     C. trachomatis/N. gonorrhoeae by AMP DNA Urine  -     Hepatitis panel, acute; Future

## 2017-12-08 LAB
CANDIDA RRNA VAG QL PROBE: NEGATIVE
G VAGINALIS RRNA GENITAL QL PROBE: POSITIVE
HSV AB, IGM BY EIA: REACTIVE
RPR SER QL: NORMAL
T VAGINALIS RRNA GENITAL QL PROBE: NEGATIVE

## 2017-12-10 LAB — HSV AB, IGM BY IFA: NEGATIVE

## 2017-12-11 ENCOUNTER — TELEPHONE (OUTPATIENT)
Dept: INTERNAL MEDICINE | Facility: CLINIC | Age: 22
End: 2017-12-11

## 2017-12-11 DIAGNOSIS — N76.0 BACTERIAL VAGINOSIS: ICD-10-CM

## 2017-12-11 DIAGNOSIS — E61.1 IRON DEFICIENCY: ICD-10-CM

## 2017-12-11 DIAGNOSIS — B96.89 BACTERIAL VAGINOSIS: ICD-10-CM

## 2017-12-11 RX ORDER — METRONIDAZOLE 500 MG/1
500 TABLET ORAL EVERY 12 HOURS
Qty: 14 TABLET | Refills: 0 | Status: SHIPPED | OUTPATIENT
Start: 2017-12-11 | End: 2017-12-18

## 2017-12-11 RX ORDER — FERROUS SULFATE 324(65)MG
325 TABLET, DELAYED RELEASE (ENTERIC COATED) ORAL 3 TIMES DAILY
Qty: 90 TABLET | Refills: 0 | Status: SHIPPED | OUTPATIENT
Start: 2017-12-11 | End: 2018-06-12

## 2017-12-11 NOTE — TELEPHONE ENCOUNTER
"Informed pt of Dr. Anaya advice: "Your vaginal swab was positive for BV, I will go ahead and send the antibiotic to your pharmacy.  Please let me know if you have any questions.  Your blood work looks good overall, you are iron deficient, I will send a supplement to your pharmacy." Pt verbalized understanding and states that she will start the medication. Pt has no further questions or concerns.       "

## 2018-03-06 ENCOUNTER — OFFICE VISIT (OUTPATIENT)
Dept: INTERNAL MEDICINE | Facility: CLINIC | Age: 23
End: 2018-03-06
Payer: MEDICAID

## 2018-03-06 VITALS
BODY MASS INDEX: 29.9 KG/M2 | HEART RATE: 84 BPM | DIASTOLIC BLOOD PRESSURE: 92 MMHG | WEIGHT: 175.13 LBS | HEIGHT: 64 IN | OXYGEN SATURATION: 99 % | SYSTOLIC BLOOD PRESSURE: 126 MMHG

## 2018-03-06 DIAGNOSIS — R07.9 CHEST PAIN, UNSPECIFIED TYPE: ICD-10-CM

## 2018-03-06 DIAGNOSIS — J35.8 TONSILLAR EXUDATE: Primary | ICD-10-CM

## 2018-03-06 DIAGNOSIS — J30.9 ALLERGIC RHINITIS, UNSPECIFIED CHRONICITY, UNSPECIFIED SEASONALITY, UNSPECIFIED TRIGGER: ICD-10-CM

## 2018-03-06 LAB
CTP QC/QA: YES
S PYO RRNA THROAT QL PROBE: NEGATIVE

## 2018-03-06 PROCEDURE — 99999 PR PBB SHADOW E&M-EST. PATIENT-LVL III: CPT | Mod: PBBFAC,,, | Performed by: FAMILY MEDICINE

## 2018-03-06 PROCEDURE — 87070 CULTURE OTHR SPECIMN AEROBIC: CPT

## 2018-03-06 PROCEDURE — 93010 ELECTROCARDIOGRAM REPORT: CPT | Mod: ,,, | Performed by: INTERNAL MEDICINE

## 2018-03-06 PROCEDURE — 99213 OFFICE O/P EST LOW 20 MIN: CPT | Mod: PBBFAC,PN | Performed by: FAMILY MEDICINE

## 2018-03-06 PROCEDURE — 93005 ELECTROCARDIOGRAM TRACING: CPT | Mod: PBBFAC,PN | Performed by: FAMILY MEDICINE

## 2018-03-06 PROCEDURE — 87880 STREP A ASSAY W/OPTIC: CPT | Mod: PBBFAC,PN | Performed by: FAMILY MEDICINE

## 2018-03-06 PROCEDURE — 99214 OFFICE O/P EST MOD 30 MIN: CPT | Mod: S$PBB,,, | Performed by: FAMILY MEDICINE

## 2018-03-06 RX ORDER — PENICILLIN V POTASSIUM 500 MG/1
500 TABLET, FILM COATED ORAL 2 TIMES DAILY
Qty: 20 TABLET | Refills: 0 | Status: SHIPPED | OUTPATIENT
Start: 2018-03-06 | End: 2018-03-16

## 2018-03-06 RX ORDER — NAPROXEN 375 MG/1
375 TABLET ORAL 2 TIMES DAILY
Qty: 30 TABLET | Refills: 0 | Status: SHIPPED | OUTPATIENT
Start: 2018-03-06 | End: 2018-06-12

## 2018-03-06 RX ORDER — CETIRIZINE HYDROCHLORIDE 10 MG/1
10 TABLET ORAL DAILY
Qty: 90 TABLET | Refills: 1 | Status: SHIPPED | OUTPATIENT
Start: 2018-03-06 | End: 2021-02-15

## 2018-03-06 NOTE — PROGRESS NOTES
"Subjective:       Patient ID: Carline Iqbal is a 23 y.o. female.    Chief Complaint: Chest Pain (right side chest pain, intermittent) and Sore Throat    HPI  22 y/o female with hx of pilonidal cyst s/p excision 5/2017, mild intermittent asthma, eczema, iron deficiency is here for chest pain. She reports she has had sternal chest pain for years, but for the past few months she gets pain daily, sternal location, feels achy, comes and goes several times a day, 4/10, worse when she pushes up to get out of bed or does lifting, especially at work, not associated with meals, no accident or injury, has not tried otc meds.  She denies f/n/v/d/constipation/palpitations/sob/urinary sx. She does have some sinus sx, sneezing, denies pnd/sore throat/cough, noticed R sided white spot on tonsil, denies pain with swallowing  Iron def: on supplement intermittently  Asthma: last albuterol was a month ago when she had cold symptoms    Review of Systems   Constitutional: Negative for activity change, appetite change, fatigue and fever.   HENT: Positive for sneezing. Negative for congestion, postnasal drip, sinus pain, sinus pressure, sore throat and trouble swallowing.    Respiratory: Negative for cough and shortness of breath.    Cardiovascular: Positive for chest pain. Negative for palpitations and leg swelling.   Gastrointestinal: Negative for constipation, diarrhea, nausea and vomiting.   Genitourinary: Negative for difficulty urinating and dysuria.   Skin: Negative for rash.   Neurological: Negative for dizziness and light-headedness.   Psychiatric/Behavioral: Negative for sleep disturbance.       Objective:      BP (!) 126/92   Pulse 84   Ht 5' 4" (1.626 m)   Wt 79.5 kg (175 lb 2.5 oz)   LMP 02/23/2018   SpO2 99%   BMI 30.07 kg/m²   Physical Exam   Constitutional: She appears well-developed and well-nourished.   HENT:   Head: Normocephalic and atraumatic.   Mouth/Throat: Uvula is midline. Oropharyngeal exudate present. " Tonsils are 3+ on the right. Tonsils are 0 on the left.   Neck: Normal range of motion. Neck supple. No thyromegaly present.   Cardiovascular: Normal rate, regular rhythm and normal heart sounds.    Pulmonary/Chest: Effort normal and breath sounds normal. No respiratory distress.   Musculoskeletal: She exhibits no edema or tenderness.   Unable to reproduce chest pain   Lymphadenopathy:     She has no cervical adenopathy.   Neurological: She is alert.   Skin: Skin is warm and dry.   Psychiatric: She has a normal mood and affect.   Nursing note and vitals reviewed.      Assessment:       1. Tonsillar exudate    2. Allergic rhinitis, unspecified chronicity, unspecified seasonality, unspecified trigger    3. Chest pain, unspecified type        Plan:   Carline was seen today for chest pain and sore throat.    Diagnoses and all orders for this visit:    Tonsillar exudate  -     POCT Rapid Strep A  -     penicillin v potassium (VEETID) 500 MG tablet; Take 1 tablet (500 mg total) by mouth 2 (two) times daily.  -     Ambulatory referral to ENT    Allergic rhinitis, unspecified chronicity, unspecified seasonality, unspecified trigger  -     cetirizine (ZYRTEC) 10 MG tablet; Take 1 tablet (10 mg total) by mouth once daily.    Chest pain, unspecified type  -     IN OFFICE EKG 12-LEAD (to [place])  -     naproxen (EC-NAPROSYN) 375 MG TbEC EC tablet; Take 1 tablet (375 mg total) by mouth 2 (two) times daily.  -     X-Ray Chest PA And Lateral; Future

## 2018-03-08 ENCOUNTER — HOSPITAL ENCOUNTER (OUTPATIENT)
Dept: RADIOLOGY | Facility: HOSPITAL | Age: 23
Discharge: HOME | End: 2018-03-08
Attending: FAMILY MEDICINE
Payer: MEDICAID

## 2018-03-08 DIAGNOSIS — R07.9 CHEST PAIN, UNSPECIFIED TYPE: ICD-10-CM

## 2018-03-08 LAB — BACTERIA THROAT CULT: NORMAL

## 2018-03-08 PROCEDURE — 71046 X-RAY EXAM CHEST 2 VIEWS: CPT | Mod: 26,,, | Performed by: RADIOLOGY

## 2018-03-08 PROCEDURE — 71046 X-RAY EXAM CHEST 2 VIEWS: CPT | Mod: TC,PO

## 2018-03-13 ENCOUNTER — PATIENT MESSAGE (OUTPATIENT)
Dept: INTERNAL MEDICINE | Facility: CLINIC | Age: 23
End: 2018-03-13

## 2018-05-29 ENCOUNTER — PATIENT MESSAGE (OUTPATIENT)
Dept: INTERNAL MEDICINE | Facility: CLINIC | Age: 23
End: 2018-05-29

## 2018-06-04 ENCOUNTER — TELEPHONE (OUTPATIENT)
Dept: INTERNAL MEDICINE | Facility: CLINIC | Age: 23
End: 2018-06-04

## 2018-06-04 DIAGNOSIS — R07.89 MUSCULAR CHEST PAIN: Primary | ICD-10-CM

## 2018-06-13 ENCOUNTER — PES CALL (OUTPATIENT)
Dept: ADMINISTRATIVE | Facility: CLINIC | Age: 23
End: 2018-06-13

## 2018-06-18 ENCOUNTER — TELEPHONE (OUTPATIENT)
Dept: INTERNAL MEDICINE | Facility: CLINIC | Age: 23
End: 2018-06-18

## 2018-06-18 DIAGNOSIS — K31.89 MASS OF STOMACH: ICD-10-CM

## 2018-06-19 PROBLEM — K31.89 MASS OF STOMACH: Status: ACTIVE | Noted: 2018-06-19

## 2018-06-26 ENCOUNTER — TELEPHONE (OUTPATIENT)
Dept: INTERNAL MEDICINE | Facility: CLINIC | Age: 23
End: 2018-06-26

## 2018-07-23 ENCOUNTER — TELEPHONE (OUTPATIENT)
Dept: INTERNAL MEDICINE | Facility: CLINIC | Age: 23
End: 2018-07-23

## 2018-07-25 ENCOUNTER — PATIENT MESSAGE (OUTPATIENT)
Dept: INTERNAL MEDICINE | Facility: CLINIC | Age: 23
End: 2018-07-25

## 2018-08-25 ENCOUNTER — PATIENT MESSAGE (OUTPATIENT)
Dept: INTERNAL MEDICINE | Facility: CLINIC | Age: 23
End: 2018-08-25

## 2018-08-27 ENCOUNTER — TELEPHONE (OUTPATIENT)
Dept: INTERNAL MEDICINE | Facility: CLINIC | Age: 23
End: 2018-08-27

## 2018-08-27 ENCOUNTER — OFFICE VISIT (OUTPATIENT)
Dept: INTERNAL MEDICINE | Facility: CLINIC | Age: 23
End: 2018-08-27
Payer: MEDICAID

## 2018-08-27 VITALS
BODY MASS INDEX: 30.26 KG/M2 | SYSTOLIC BLOOD PRESSURE: 110 MMHG | HEART RATE: 61 BPM | DIASTOLIC BLOOD PRESSURE: 80 MMHG | HEIGHT: 64 IN | OXYGEN SATURATION: 99 % | WEIGHT: 177.25 LBS

## 2018-08-27 DIAGNOSIS — J35.8 TONSILLAR EXUDATE: Primary | ICD-10-CM

## 2018-08-27 DIAGNOSIS — L30.8 OTHER ECZEMA: ICD-10-CM

## 2018-08-27 LAB
CTP QC/QA: YES
S PYO RRNA THROAT QL PROBE: NEGATIVE

## 2018-08-27 PROCEDURE — 99999 PR PBB SHADOW E&M-EST. PATIENT-LVL IV: CPT | Mod: PBBFAC,,, | Performed by: FAMILY MEDICINE

## 2018-08-27 PROCEDURE — 87880 STREP A ASSAY W/OPTIC: CPT | Mod: PBBFAC,PN | Performed by: FAMILY MEDICINE

## 2018-08-27 PROCEDURE — 99213 OFFICE O/P EST LOW 20 MIN: CPT | Mod: S$PBB,,, | Performed by: FAMILY MEDICINE

## 2018-08-27 PROCEDURE — 99214 OFFICE O/P EST MOD 30 MIN: CPT | Mod: PBBFAC,PN | Performed by: FAMILY MEDICINE

## 2018-08-27 PROCEDURE — 87070 CULTURE OTHR SPECIMN AEROBIC: CPT

## 2018-08-27 RX ORDER — CLOBETASOL PROPIONATE 0.5 MG/G
CREAM TOPICAL 2 TIMES DAILY
Qty: 60 G | Refills: 0 | Status: SHIPPED | OUTPATIENT
Start: 2018-08-27 | End: 2018-08-28

## 2018-08-27 NOTE — TELEPHONE ENCOUNTER
[facility] states that clobetasol cream is not covered and the insurance will not allow a PA. Pharmacy does not know of any replacement med.

## 2018-08-27 NOTE — TELEPHONE ENCOUNTER
LM for the patient to call the office. Need to see if she wants to come in to see Dr Anaya today, we had a cancellation at 11am. Will send portal message as well.

## 2018-08-27 NOTE — PROGRESS NOTES
"Subjective:       Patient ID: Carline Iqbal is a 23 y.o. female.    Chief Complaint: Chest Pain and Sore Throat    HPI   24 y/o female with hx of pilonidal cyst s/p excision 5/2017, mild intermittent asthma, eczema, iron deficiency, hx of chlamydia is here for throat swelling and redness since last Tuesday.  She has exudate on her tonsill she has a fullness feeling in the back of her throat R>L, denies sore throat/dysphagia, last antibiotic use was march, no known sick contacts, she denies f/n/v/d/constipation/cp/sob/urinary sx/fatigue/joint pain.  Mass in stomach and gastrtitis, EGD 6/2018 Dr. Mann, now has endoscopic US set for this week  Eczema: using clobetasol prn on ankles    Review of Systems   Constitutional: Negative for activity change, appetite change, fatigue and fever.   HENT: Negative for congestion, postnasal drip, rhinorrhea, sinus pain and sore throat.    Respiratory: Negative for cough and shortness of breath.    Cardiovascular: Negative for chest pain, palpitations and leg swelling.   Gastrointestinal: Negative for constipation, diarrhea, nausea and vomiting.   Genitourinary: Negative for difficulty urinating and dysuria.   Skin: Negative for rash.   Neurological: Negative for dizziness and light-headedness.   Psychiatric/Behavioral: Negative for sleep disturbance.       Objective:      /80 (BP Location: Right arm, Patient Position: Sitting, BP Method: Large (Manual))   Pulse 61   Ht 5' 4" (1.626 m)   Wt 80.4 kg (177 lb 4 oz)   LMP 08/23/2018 (Exact Date)   SpO2 99%   BMI 30.42 kg/m²   Physical Exam   Constitutional: She appears well-developed and well-nourished.   HENT:   Head: Normocephalic and atraumatic.   Right Ear: Tympanic membrane normal.   Left Ear: Tympanic membrane normal.   Nose: Nose normal.   Mouth/Throat: Uvula is midline. Oropharyngeal exudate and posterior oropharyngeal edema present. Tonsils are 2+ on the right. Tonsils are 2+ on the left. Tonsillar exudate. "   Neck: Normal range of motion. Neck supple. No thyromegaly present.   Cardiovascular: Normal rate, regular rhythm and normal heart sounds.   Pulmonary/Chest: Effort normal and breath sounds normal. No respiratory distress.   Musculoskeletal: She exhibits no edema.   Lymphadenopathy:     She has no cervical adenopathy.   Neurological: She is alert.   Skin: Skin is warm and dry.   Psychiatric: She has a normal mood and affect.   Nursing note and vitals reviewed.      Assessment:       1. Tonsillar exudate    2. Eczema        Plan:   Carline was seen today for chest pain and sore throat.    Diagnoses and all orders for this visit:    Tonsillar exudate  -     POCT Rapid Strep A  -     Throat culture    Eczema  -     clobetasol (TEMOVATE) 0.05 % cream; Apply topically 2 (two) times daily. for 10 days

## 2018-08-28 RX ORDER — TRIAMCINOLONE ACETONIDE 1 MG/G
OINTMENT TOPICAL 2 TIMES DAILY
Qty: 80 G | Refills: 0 | Status: SHIPPED | OUTPATIENT
Start: 2018-08-28 | End: 2019-11-04 | Stop reason: SDUPTHER

## 2018-08-29 ENCOUNTER — TELEPHONE (OUTPATIENT)
Dept: INTERNAL MEDICINE | Facility: CLINIC | Age: 23
End: 2018-08-29

## 2018-08-29 DIAGNOSIS — J35.8 TONSILLAR EXUDATE: Primary | ICD-10-CM

## 2018-08-29 LAB — BACTERIA THROAT CULT: NORMAL

## 2018-08-29 NOTE — TELEPHONE ENCOUNTER
----- Message from Jl Banegas sent at 8/29/2018  2:04 PM CDT -----            Name of Who is Calling: NICHELLE THACKER [[id number]]      What is the request in detail: Pt is returning a call from Nayla. Please call back.      Can the clinic reply by [facility]: yes      What [facility] [phone]

## 2018-08-29 NOTE — TELEPHONE ENCOUNTER
Informed pt of throat culture results. Pt verbalized understanding.    Pt states that she does want to go to ENT and is requesting the referral, please fill out the referral. Gave pt the number to ENT.

## 2018-08-29 NOTE — TELEPHONE ENCOUNTER
Please let her know her final throat culture came back normal.  I would like for her to see ENT.  I know a group on the [employer] that will take her insurance the phone number is [phone].  If she is willing we can fax a referral and help her with an appointment.

## 2018-10-30 ENCOUNTER — PATIENT MESSAGE (OUTPATIENT)
Dept: INTERNAL MEDICINE | Facility: CLINIC | Age: 23
End: 2018-10-30

## 2018-11-05 ENCOUNTER — PATIENT MESSAGE (OUTPATIENT)
Dept: INTERNAL MEDICINE | Facility: CLINIC | Age: 23
End: 2018-11-05

## 2018-11-20 ENCOUNTER — OFFICE VISIT (OUTPATIENT)
Dept: INTERNAL MEDICINE | Facility: CLINIC | Age: 23
End: 2018-11-20
Payer: MEDICAID

## 2018-11-20 ENCOUNTER — LAB VISIT (OUTPATIENT)
Dept: LAB | Facility: HOSPITAL | Age: 23
End: 2018-11-20
Attending: FAMILY MEDICINE
Payer: MEDICAID

## 2018-11-20 VITALS
WEIGHT: 177.94 LBS | HEART RATE: 76 BPM | SYSTOLIC BLOOD PRESSURE: 137 MMHG | DIASTOLIC BLOOD PRESSURE: 71 MMHG | HEIGHT: 64 IN | BODY MASS INDEX: 30.38 KG/M2 | OXYGEN SATURATION: 99 %

## 2018-11-20 DIAGNOSIS — Z11.3 SCREEN FOR STD (SEXUALLY TRANSMITTED DISEASE): ICD-10-CM

## 2018-11-20 DIAGNOSIS — Z13.220 ENCOUNTER FOR LIPID SCREENING FOR CARDIOVASCULAR DISEASE: ICD-10-CM

## 2018-11-20 DIAGNOSIS — E61.1 IRON DEFICIENCY: ICD-10-CM

## 2018-11-20 DIAGNOSIS — L20.82 FLEXURAL ECZEMA: ICD-10-CM

## 2018-11-20 DIAGNOSIS — K31.89 MASS OF STOMACH: ICD-10-CM

## 2018-11-20 DIAGNOSIS — J45.20 ASTHMA, MILD INTERMITTENT, WELL-CONTROLLED: ICD-10-CM

## 2018-11-20 DIAGNOSIS — Z13.6 ENCOUNTER FOR LIPID SCREENING FOR CARDIOVASCULAR DISEASE: ICD-10-CM

## 2018-11-20 DIAGNOSIS — Z00.00 ANNUAL PHYSICAL EXAM: Primary | ICD-10-CM

## 2018-11-20 DIAGNOSIS — Z00.00 ANNUAL PHYSICAL EXAM: ICD-10-CM

## 2018-11-20 LAB
ALBUMIN SERPL BCP-MCNC: 3.8 G/DL
ALP SERPL-CCNC: 103 U/L
ALT SERPL W/O P-5'-P-CCNC: 17 U/L
ANION GAP SERPL CALC-SCNC: 7 MMOL/L
AST SERPL-CCNC: 15 U/L
BASOPHILS # BLD AUTO: 0.02 K/UL
BASOPHILS NFR BLD: 0.2 %
BILIRUB SERPL-MCNC: 0.3 MG/DL
BUN SERPL-MCNC: 9 MG/DL
CALCIUM SERPL-MCNC: 9.6 MG/DL
CHLORIDE SERPL-SCNC: 105 MMOL/L
CHOLEST SERPL-MCNC: 144 MG/DL
CHOLEST/HDLC SERPL: 3 {RATIO}
CO2 SERPL-SCNC: 26 MMOL/L
CREAT SERPL-MCNC: 0.6 MG/DL
DIFFERENTIAL METHOD: ABNORMAL
EOSINOPHIL # BLD AUTO: 0.2 K/UL
EOSINOPHIL NFR BLD: 1.7 %
ERYTHROCYTE [DISTWIDTH] IN BLOOD BY AUTOMATED COUNT: 16.3 %
EST. GFR  (AFRICAN AMERICAN): >60 ML/MIN/1.73 M^2
EST. GFR  (NON AFRICAN AMERICAN): >60 ML/MIN/1.73 M^2
ESTIMATED AVG GLUCOSE: 103 MG/DL
FERRITIN SERPL-MCNC: 18 NG/ML
GLUCOSE SERPL-MCNC: 80 MG/DL
HBA1C MFR BLD HPLC: 5.2 %
HCT VFR BLD AUTO: 37.4 %
HDLC SERPL-MCNC: 48 MG/DL
HDLC SERPL: 33.3 %
HGB BLD-MCNC: 11.7 G/DL
IRON SERPL-MCNC: 25 UG/DL
LDLC SERPL CALC-MCNC: 82.6 MG/DL
LYMPHOCYTES # BLD AUTO: 3.1 K/UL
LYMPHOCYTES NFR BLD: 35.1 %
MCH RBC QN AUTO: 22.9 PG
MCHC RBC AUTO-ENTMCNC: 31.3 G/DL
MCV RBC AUTO: 73 FL
MONOCYTES # BLD AUTO: 0.3 K/UL
MONOCYTES NFR BLD: 3.4 %
NEUTROPHILS # BLD AUTO: 5.2 K/UL
NEUTROPHILS NFR BLD: 59.6 %
NONHDLC SERPL-MCNC: 96 MG/DL
PLATELET # BLD AUTO: 222 K/UL
PLATELET BLD QL SMEAR: ABNORMAL
PMV BLD AUTO: ABNORMAL FL
POTASSIUM SERPL-SCNC: 3.7 MMOL/L
PROT SERPL-MCNC: 8 G/DL
RBC # BLD AUTO: 5.11 M/UL
RETICS/RBC NFR AUTO: 1.2 %
SATURATED IRON: 5 %
SODIUM SERPL-SCNC: 138 MMOL/L
TOTAL IRON BINDING CAPACITY: 472 UG/DL
TRANSFERRIN SERPL-MCNC: 319 MG/DL
TRIGL SERPL-MCNC: 67 MG/DL
TSH SERPL DL<=0.005 MIU/L-ACNC: 1.11 UIU/ML
WBC # BLD AUTO: 8.72 K/UL

## 2018-11-20 PROCEDURE — 87591 N.GONORRHOEAE DNA AMP PROB: CPT

## 2018-11-20 PROCEDURE — 80074 ACUTE HEPATITIS PANEL: CPT

## 2018-11-20 PROCEDURE — 99999 PR PBB SHADOW E&M-EST. PATIENT-LVL III: CPT | Mod: PBBFAC,,, | Performed by: FAMILY MEDICINE

## 2018-11-20 PROCEDURE — 83036 HEMOGLOBIN GLYCOSYLATED A1C: CPT

## 2018-11-20 PROCEDURE — 36415 COLL VENOUS BLD VENIPUNCTURE: CPT

## 2018-11-20 PROCEDURE — 86592 SYPHILIS TEST NON-TREP QUAL: CPT

## 2018-11-20 PROCEDURE — 99395 PREV VISIT EST AGE 18-39: CPT | Mod: S$PBB,,, | Performed by: FAMILY MEDICINE

## 2018-11-20 PROCEDURE — 80061 LIPID PANEL: CPT

## 2018-11-20 PROCEDURE — 85045 AUTOMATED RETICULOCYTE COUNT: CPT

## 2018-11-20 PROCEDURE — 80053 COMPREHEN METABOLIC PANEL: CPT

## 2018-11-20 PROCEDURE — 83540 ASSAY OF IRON: CPT

## 2018-11-20 PROCEDURE — 86703 HIV-1/HIV-2 1 RESULT ANTBDY: CPT

## 2018-11-20 PROCEDURE — 82728 ASSAY OF FERRITIN: CPT

## 2018-11-20 PROCEDURE — 84443 ASSAY THYROID STIM HORMONE: CPT

## 2018-11-20 PROCEDURE — 99213 OFFICE O/P EST LOW 20 MIN: CPT | Mod: PBBFAC,PN | Performed by: FAMILY MEDICINE

## 2018-11-20 PROCEDURE — 85025 COMPLETE CBC W/AUTO DIFF WBC: CPT

## 2018-11-20 PROCEDURE — 87491 CHLMYD TRACH DNA AMP PROBE: CPT

## 2018-11-20 NOTE — PROGRESS NOTES
"Subjective:       Patient ID: Carline Iqbal is a 23 y.o. female.    Chief Complaint: Annual Exam    HPI  24 y/o female with hx of pilonidal cyst s/p excision 5/2017, mild intermittent asthma, eczema, iron deficiency, hx of chlamydia is here for annual exam. She is doing well, her sore throat resolved shortly after last visit, she did not follow up with ENT at [facility], denies f/n/v/d/constipation/cp/sob/urinary sx/fatigue/joint pain. She would like to be tested for STD, denies vaginal symptoms.  Mass in stomach and gastrtitis, EGD 6/2018 Dr. Mann, had endoscopic US, she reports he said the mass was benign and she was supposed to follow up in 6 weeks but has not  Eczema: using clobetasol prn on ankles  Asthma:albuterol prn, using about less than 1 time a month  Declined flu shot    Review of Systems   Constitutional: Negative for activity change, appetite change, fatigue and fever.   Respiratory: Negative for cough and shortness of breath.    Cardiovascular: Negative for chest pain, palpitations and leg swelling.   Gastrointestinal: Negative for constipation, diarrhea, nausea and vomiting.   Genitourinary: Negative for difficulty urinating and dysuria.   Skin: Negative for rash.   Neurological: Negative for dizziness and light-headedness.   Psychiatric/Behavioral: Negative for sleep disturbance.       Objective:      /71 (BP Location: Left arm, Patient Position: Sitting, BP Method: Large (Automatic))   Pulse 76   Ht 5' 4" (1.626 m)   Wt 80.7 kg (177 lb 14.6 oz)   LMP 11/16/2018   SpO2 99%   BMI 30.54 kg/m²   Physical Exam   Constitutional: She appears well-developed and well-nourished.   HENT:   Head: Normocephalic and atraumatic.   Mouth/Throat: No oropharyngeal exudate.   Neck: Normal range of motion. Neck supple. No thyromegaly present.   Cardiovascular: Normal rate, regular rhythm and normal heart sounds.   Pulmonary/Chest: Effort normal and breath sounds normal. No respiratory distress. "   Abdominal: Soft. Bowel sounds are normal. She exhibits no distension. There is no tenderness.   Musculoskeletal: She exhibits no edema.   Lymphadenopathy:     She has no cervical adenopathy.   Neurological: She is alert.   Skin: Skin is warm and dry.   Psychiatric: She has a normal mood and affect.   Nursing note and vitals reviewed.      Assessment:       1. Annual physical exam    2. Iron deficiency    3. Asthma, mild intermittent, well-controlled    4. Mass of stomach    5. Flexural eczema    6. Screen for STD (sexually transmitted disease)    7. Encounter for lipid screening for cardiovascular disease        Plan:   Carline was seen today for annual exam.    Diagnoses and all orders for this visit:    Annual physical exam  -     C. trachomatis/N. gonorrhoeae by AMP DNA; Future  -     Hepatitis panel, acute; Future  -     HIV-1 and HIV-2 antibodies; Future  -     RPR; Future  -     CBC auto differential; Future  -     Comprehensive metabolic panel; Future  -     Hemoglobin A1c; Future  -     Lipid panel; Future  -     TSH; Future  -     Iron and TIBC; Future  -     Ferritin; Future  -     Reticulocytes; Future  -     C. trachomatis/N. gonorrhoeae by AMP DNA    Iron deficiency  -     Iron and TIBC; Future  -     Ferritin; Future  -     Reticulocytes; Future    Asthma, mild intermittent, well-controlled    Mass of stomach    Flexural eczema    Screen for STD (sexually transmitted disease)  -     C. trachomatis/N. gonorrhoeae by AMP DNA; Future  -     Hepatitis panel, acute; Future  -     HIV-1 and HIV-2 antibodies; Future  -     RPR; Future  -     C. trachomatis/N. gonorrhoeae by AMP DNA    Encounter for lipid screening for cardiovascular disease  -     Lipid panel; Future

## 2018-11-21 LAB
HAV IGM SERPL QL IA: NEGATIVE
HBV CORE IGM SERPL QL IA: NEGATIVE
HBV SURFACE AG SERPL QL IA: NEGATIVE
HCV AB SERPL QL IA: NEGATIVE
HIV 1+2 AB+HIV1 P24 AG SERPL QL IA: NEGATIVE
RPR SER QL: NORMAL

## 2018-11-23 LAB
C TRACH DNA SPEC QL NAA+PROBE: NOT DETECTED
N GONORRHOEA DNA SPEC QL NAA+PROBE: NOT DETECTED

## 2018-11-26 ENCOUNTER — TELEPHONE (OUTPATIENT)
Dept: INTERNAL MEDICINE | Facility: CLINIC | Age: 23
End: 2018-11-26

## 2018-11-26 DIAGNOSIS — E61.1 IRON DEFICIENCY: Primary | ICD-10-CM

## 2018-11-26 RX ORDER — FERROUS GLUCONATE 324(37.5)
1 TABLET ORAL
Qty: 270 TABLET | Refills: 3 | Status: SHIPPED | OUTPATIENT
Start: 2018-11-26 | End: 2021-02-15

## 2019-01-31 ENCOUNTER — TELEPHONE (OUTPATIENT)
Dept: INTERNAL MEDICINE | Facility: CLINIC | Age: 24
End: 2019-01-31

## 2019-01-31 NOTE — TELEPHONE ENCOUNTER
LM for pt to contact the office so that she can verify if she still have refills left on her iron medication

## 2019-08-11 ENCOUNTER — PATIENT MESSAGE (OUTPATIENT)
Dept: OBSTETRICS AND GYNECOLOGY | Facility: CLINIC | Age: 24
End: 2019-08-11

## 2019-08-12 ENCOUNTER — TELEPHONE (OUTPATIENT)
Dept: OBSTETRICS AND GYNECOLOGY | Facility: CLINIC | Age: 24
End: 2019-08-12

## 2019-11-04 DIAGNOSIS — L20.82 FLEXURAL ECZEMA: Primary | ICD-10-CM

## 2019-11-04 RX ORDER — TRIAMCINOLONE ACETONIDE 1 MG/G
OINTMENT TOPICAL 2 TIMES DAILY
Qty: 30 G | Refills: 0 | Status: SHIPPED | OUTPATIENT
Start: 2019-11-04 | End: 2021-01-04 | Stop reason: SDUPTHER

## 2019-11-04 RX ORDER — TRIAMCINOLONE ACETONIDE 1 MG/G
OINTMENT TOPICAL 2 TIMES DAILY
Qty: 80 G | Refills: 0 | Status: CANCELLED | OUTPATIENT
Start: 2019-11-04 | End: 2019-11-14

## 2019-11-04 NOTE — TELEPHONE ENCOUNTER
Spoke to pt about Refill sent to [facility].      Pt scheduled Annual appointment 11/20/19. Pt wants labs prior. Please advise.

## 2019-11-07 ENCOUNTER — PATIENT MESSAGE (OUTPATIENT)
Dept: INTERNAL MEDICINE | Facility: CLINIC | Age: 24
End: 2019-11-07

## 2020-01-24 ENCOUNTER — PATIENT MESSAGE (OUTPATIENT)
Dept: INTERNAL MEDICINE | Facility: CLINIC | Age: 25
End: 2020-01-24

## 2020-01-24 NOTE — TELEPHONE ENCOUNTER
Unfortunately, it absolutely sounds like she should be evaluated.  She is describing severe persistent symptoms despite treatment with antibiotics.  We would want a throat culture and to take a look, possibly even to prescribe an alternate medication.  As for home remedies, honey, salt water gargles, and Tylenol are options, along with throat lozenges if she can tolerate that.  Evaluation in clinic/urgent care would be needed for anything more than that however.  Thanks.

## 2020-01-25 ENCOUNTER — PATIENT MESSAGE (OUTPATIENT)
Dept: INTERNAL MEDICINE | Facility: CLINIC | Age: 25
End: 2020-01-25

## 2020-02-06 DIAGNOSIS — L20.82 FLEXURAL ECZEMA: ICD-10-CM

## 2020-02-06 RX ORDER — TRIAMCINOLONE ACETONIDE 1 MG/G
OINTMENT TOPICAL 2 TIMES DAILY
Qty: 30 G | Refills: 0 | OUTPATIENT
Start: 2020-02-06 | End: 2020-02-16

## 2020-02-06 NOTE — TELEPHONE ENCOUNTER
Spoke with pt and informed her that Dr Anaya denied her Kenalog ointment and she needed to be scheduled for an annual. Pt stated she does not have insurance at the moment and cannot schedule anything at this time.

## 2021-01-04 ENCOUNTER — OFFICE VISIT (OUTPATIENT)
Dept: INTERNAL MEDICINE | Facility: CLINIC | Age: 26
End: 2021-01-04
Payer: COMMERCIAL

## 2021-01-04 VITALS
DIASTOLIC BLOOD PRESSURE: 70 MMHG | OXYGEN SATURATION: 99 % | WEIGHT: 174.06 LBS | SYSTOLIC BLOOD PRESSURE: 110 MMHG | BODY MASS INDEX: 29.72 KG/M2 | HEIGHT: 64 IN | HEART RATE: 87 BPM | TEMPERATURE: 98 F

## 2021-01-04 DIAGNOSIS — Z00.00 ANNUAL PHYSICAL EXAM: Primary | ICD-10-CM

## 2021-01-04 DIAGNOSIS — Z11.3 SCREEN FOR STD (SEXUALLY TRANSMITTED DISEASE): ICD-10-CM

## 2021-01-04 DIAGNOSIS — J45.21 MILD INTERMITTENT ASTHMATIC BRONCHITIS WITH ACUTE EXACERBATION: ICD-10-CM

## 2021-01-04 DIAGNOSIS — L20.82 FLEXURAL ECZEMA: ICD-10-CM

## 2021-01-04 DIAGNOSIS — J45.20 ASTHMA, MILD INTERMITTENT, WELL-CONTROLLED: ICD-10-CM

## 2021-01-04 DIAGNOSIS — Z13.220 ENCOUNTER FOR LIPID SCREENING FOR CARDIOVASCULAR DISEASE: ICD-10-CM

## 2021-01-04 DIAGNOSIS — Z13.6 ENCOUNTER FOR LIPID SCREENING FOR CARDIOVASCULAR DISEASE: ICD-10-CM

## 2021-01-04 DIAGNOSIS — R05.9 COUGH: ICD-10-CM

## 2021-01-04 DIAGNOSIS — Z12.4 CERVICAL CANCER SCREENING: ICD-10-CM

## 2021-01-04 PROCEDURE — 99999 PR PBB SHADOW E&M-EST. PATIENT-LVL III: ICD-10-PCS | Mod: PBBFAC,,, | Performed by: FAMILY MEDICINE

## 2021-01-04 PROCEDURE — 3008F PR BODY MASS INDEX (BMI) DOCUMENTED: ICD-10-PCS | Mod: CPTII,S$GLB,, | Performed by: FAMILY MEDICINE

## 2021-01-04 PROCEDURE — 99395 PR PREVENTIVE VISIT,EST,18-39: ICD-10-PCS | Mod: S$GLB,,, | Performed by: FAMILY MEDICINE

## 2021-01-04 PROCEDURE — 3008F BODY MASS INDEX DOCD: CPT | Mod: CPTII,S$GLB,, | Performed by: FAMILY MEDICINE

## 2021-01-04 PROCEDURE — 99395 PREV VISIT EST AGE 18-39: CPT | Mod: S$GLB,,, | Performed by: FAMILY MEDICINE

## 2021-01-04 PROCEDURE — 99999 PR PBB SHADOW E&M-EST. PATIENT-LVL III: CPT | Mod: PBBFAC,,, | Performed by: FAMILY MEDICINE

## 2021-01-04 PROCEDURE — 1126F PR PAIN SEVERITY QUANTIFIED, NO PAIN PRESENT: ICD-10-PCS | Mod: S$GLB,,, | Performed by: FAMILY MEDICINE

## 2021-01-04 PROCEDURE — 1126F AMNT PAIN NOTED NONE PRSNT: CPT | Mod: S$GLB,,, | Performed by: FAMILY MEDICINE

## 2021-01-04 RX ORDER — BENZONATATE 100 MG/1
100 CAPSULE ORAL NIGHTLY PRN
Qty: 30 CAPSULE | Refills: 0 | Status: SHIPPED | OUTPATIENT
Start: 2021-01-04 | End: 2021-01-14

## 2021-01-04 RX ORDER — PREDNISONE 20 MG/1
40 TABLET ORAL DAILY
Qty: 10 TABLET | Refills: 0 | Status: SHIPPED | OUTPATIENT
Start: 2021-01-04 | End: 2021-01-09

## 2021-01-04 RX ORDER — TRIAMCINOLONE ACETONIDE 1 MG/G
OINTMENT TOPICAL 2 TIMES DAILY
Qty: 30 G | Refills: 0 | Status: SHIPPED | OUTPATIENT
Start: 2021-01-04 | End: 2021-02-25 | Stop reason: SDUPTHER

## 2021-01-04 RX ORDER — ALBUTEROL SULFATE 90 UG/1
2 AEROSOL, METERED RESPIRATORY (INHALATION) EVERY 4 HOURS PRN
Qty: 18 G | Refills: 1 | Status: SHIPPED | OUTPATIENT
Start: 2021-01-04 | End: 2021-05-27

## 2021-01-05 ENCOUNTER — PATIENT MESSAGE (OUTPATIENT)
Dept: ADMINISTRATIVE | Facility: HOSPITAL | Age: 26
End: 2021-01-05

## 2021-02-15 ENCOUNTER — OFFICE VISIT (OUTPATIENT)
Dept: OBSTETRICS AND GYNECOLOGY | Facility: CLINIC | Age: 26
End: 2021-02-15
Payer: COMMERCIAL

## 2021-02-15 VITALS
HEART RATE: 78 BPM | SYSTOLIC BLOOD PRESSURE: 124 MMHG | HEIGHT: 64 IN | DIASTOLIC BLOOD PRESSURE: 87 MMHG | WEIGHT: 173.81 LBS | BODY MASS INDEX: 29.67 KG/M2 | RESPIRATION RATE: 18 BRPM

## 2021-02-15 DIAGNOSIS — Z01.419 WELL WOMAN EXAM WITH ROUTINE GYNECOLOGICAL EXAM: ICD-10-CM

## 2021-02-15 DIAGNOSIS — Z11.3 SCREEN FOR STD (SEXUALLY TRANSMITTED DISEASE): ICD-10-CM

## 2021-02-15 DIAGNOSIS — Z12.4 CERVICAL CANCER SCREENING: Primary | ICD-10-CM

## 2021-02-15 PROCEDURE — 99999 PR PBB SHADOW E&M-EST. PATIENT-LVL III: CPT | Mod: PBBFAC,,, | Performed by: ADVANCED PRACTICE MIDWIFE

## 2021-02-15 PROCEDURE — 99385 PR PREVENTIVE VISIT,NEW,18-39: ICD-10-PCS | Mod: S$GLB,,, | Performed by: ADVANCED PRACTICE MIDWIFE

## 2021-02-15 PROCEDURE — 1126F PR PAIN SEVERITY QUANTIFIED, NO PAIN PRESENT: ICD-10-PCS | Mod: S$GLB,,, | Performed by: ADVANCED PRACTICE MIDWIFE

## 2021-02-15 PROCEDURE — 87591 N.GONORRHOEAE DNA AMP PROB: CPT

## 2021-02-15 PROCEDURE — 87481 CANDIDA DNA AMP PROBE: CPT | Mod: 59

## 2021-02-15 PROCEDURE — 99385 PREV VISIT NEW AGE 18-39: CPT | Mod: S$GLB,,, | Performed by: ADVANCED PRACTICE MIDWIFE

## 2021-02-15 PROCEDURE — 3008F PR BODY MASS INDEX (BMI) DOCUMENTED: ICD-10-PCS | Mod: CPTII,S$GLB,, | Performed by: ADVANCED PRACTICE MIDWIFE

## 2021-02-15 PROCEDURE — 3008F BODY MASS INDEX DOCD: CPT | Mod: CPTII,S$GLB,, | Performed by: ADVANCED PRACTICE MIDWIFE

## 2021-02-15 PROCEDURE — 99999 PR PBB SHADOW E&M-EST. PATIENT-LVL III: ICD-10-PCS | Mod: PBBFAC,,, | Performed by: ADVANCED PRACTICE MIDWIFE

## 2021-02-15 PROCEDURE — 87491 CHLMYD TRACH DNA AMP PROBE: CPT

## 2021-02-15 PROCEDURE — 1126F AMNT PAIN NOTED NONE PRSNT: CPT | Mod: S$GLB,,, | Performed by: ADVANCED PRACTICE MIDWIFE

## 2021-02-15 PROCEDURE — 88175 CYTOPATH C/V AUTO FLUID REDO: CPT | Performed by: ADVANCED PRACTICE MIDWIFE

## 2021-02-16 LAB
C TRACH DNA SPEC QL NAA+PROBE: NOT DETECTED
N GONORRHOEA DNA SPEC QL NAA+PROBE: NOT DETECTED

## 2021-02-17 LAB
BACTERIAL VAGINOSIS DNA: NEGATIVE
CANDIDA GLABRATA DNA: NEGATIVE
CANDIDA KRUSEI DNA: NEGATIVE
CANDIDA RRNA VAG QL PROBE: NEGATIVE
T VAGINALIS RRNA GENITAL QL PROBE: POSITIVE

## 2021-02-18 DIAGNOSIS — A59.9 TRICHIMONIASIS: ICD-10-CM

## 2021-02-18 RX ORDER — METRONIDAZOLE 500 MG/1
2000 TABLET ORAL ONCE
Qty: 4 TABLET | Refills: 0 | Status: SHIPPED | OUTPATIENT
Start: 2021-02-18 | End: 2021-02-18

## 2021-02-25 DIAGNOSIS — L20.82 FLEXURAL ECZEMA: ICD-10-CM

## 2021-02-26 RX ORDER — METRONIDAZOLE 500 MG/1
TABLET ORAL
COMMUNITY
Start: 2021-02-18 | End: 2021-04-20

## 2021-02-26 RX ORDER — TRIAMCINOLONE ACETONIDE 1 MG/G
OINTMENT TOPICAL 2 TIMES DAILY
Qty: 30 G | Refills: 0 | Status: SHIPPED | OUTPATIENT
Start: 2021-02-26 | End: 2021-09-24 | Stop reason: SDUPTHER

## 2021-03-12 LAB
FINAL PATHOLOGIC DIAGNOSIS: NORMAL
Lab: NORMAL

## 2021-04-20 ENCOUNTER — HOSPITAL ENCOUNTER (OUTPATIENT)
Dept: PULMONOLOGY | Facility: HOSPITAL | Age: 26
Discharge: HOME | End: 2021-04-20
Attending: INTERNAL MEDICINE
Payer: COMMERCIAL

## 2021-04-20 ENCOUNTER — PATIENT MESSAGE (OUTPATIENT)
Dept: INTERNAL MEDICINE | Facility: CLINIC | Age: 26
End: 2021-04-20

## 2021-04-20 ENCOUNTER — OFFICE VISIT (OUTPATIENT)
Dept: INTERNAL MEDICINE | Facility: CLINIC | Age: 26
End: 2021-04-20
Payer: COMMERCIAL

## 2021-04-20 VITALS
WEIGHT: 169.75 LBS | BODY MASS INDEX: 28.98 KG/M2 | HEART RATE: 127 BPM | RESPIRATION RATE: 17 BRPM | DIASTOLIC BLOOD PRESSURE: 70 MMHG | OXYGEN SATURATION: 98 % | SYSTOLIC BLOOD PRESSURE: 102 MMHG | HEIGHT: 64 IN

## 2021-04-20 DIAGNOSIS — E61.1 IRON DEFICIENCY: ICD-10-CM

## 2021-04-20 DIAGNOSIS — R42 LIGHTHEADEDNESS: ICD-10-CM

## 2021-04-20 DIAGNOSIS — J45.20 ASTHMA, MILD INTERMITTENT, WELL-CONTROLLED: ICD-10-CM

## 2021-04-20 DIAGNOSIS — Z76.89 ENCOUNTER TO ESTABLISH CARE: ICD-10-CM

## 2021-04-20 DIAGNOSIS — L20.82 FLEXURAL ECZEMA: ICD-10-CM

## 2021-04-20 DIAGNOSIS — R00.0 TACHYCARDIA: Primary | ICD-10-CM

## 2021-04-20 DIAGNOSIS — R00.0 TACHYCARDIA: ICD-10-CM

## 2021-04-20 PROBLEM — A59.9 TRICHIMONIASIS: Status: RESOLVED | Noted: 2021-02-18 | Resolved: 2021-04-20

## 2021-04-20 PROBLEM — B96.89 BACTERIAL VAGINOSIS: Status: RESOLVED | Noted: 2017-12-11 | Resolved: 2021-04-20

## 2021-04-20 PROBLEM — N76.0 BACTERIAL VAGINOSIS: Status: RESOLVED | Noted: 2017-12-11 | Resolved: 2021-04-20

## 2021-04-20 PROCEDURE — 99999 PR PBB SHADOW E&M-EST. PATIENT-LVL III: CPT | Mod: PBBFAC,,, | Performed by: INTERNAL MEDICINE

## 2021-04-20 PROCEDURE — 1126F AMNT PAIN NOTED NONE PRSNT: CPT | Mod: S$GLB,,, | Performed by: INTERNAL MEDICINE

## 2021-04-20 PROCEDURE — 99204 PR OFFICE/OUTPT VISIT, NEW, LEVL IV, 45-59 MIN: ICD-10-PCS | Mod: S$GLB,,, | Performed by: INTERNAL MEDICINE

## 2021-04-20 PROCEDURE — 99999 PR PBB SHADOW E&M-EST. PATIENT-LVL III: ICD-10-PCS | Mod: PBBFAC,,, | Performed by: INTERNAL MEDICINE

## 2021-04-20 PROCEDURE — 3008F BODY MASS INDEX DOCD: CPT | Mod: CPTII,S$GLB,, | Performed by: INTERNAL MEDICINE

## 2021-04-20 PROCEDURE — 93010 ELECTROCARDIOGRAM REPORT: CPT | Mod: ,,, | Performed by: INTERNAL MEDICINE

## 2021-04-20 PROCEDURE — 1126F PR PAIN SEVERITY QUANTIFIED, NO PAIN PRESENT: ICD-10-PCS | Mod: S$GLB,,, | Performed by: INTERNAL MEDICINE

## 2021-04-20 PROCEDURE — 3008F PR BODY MASS INDEX (BMI) DOCUMENTED: ICD-10-PCS | Mod: CPTII,S$GLB,, | Performed by: INTERNAL MEDICINE

## 2021-04-20 PROCEDURE — 99204 OFFICE O/P NEW MOD 45 MIN: CPT | Mod: S$GLB,,, | Performed by: INTERNAL MEDICINE

## 2021-04-20 PROCEDURE — 93005 ELECTROCARDIOGRAM TRACING: CPT

## 2021-04-20 PROCEDURE — 93010 EKG 12-LEAD: ICD-10-PCS | Mod: ,,, | Performed by: INTERNAL MEDICINE

## 2021-04-20 RX ORDER — MECLIZINE HYDROCHLORIDE 25 MG/1
25 TABLET ORAL 3 TIMES DAILY PRN
Qty: 30 TABLET | Refills: 0 | Status: SHIPPED | OUTPATIENT
Start: 2021-04-20 | End: 2021-05-27

## 2021-05-25 ENCOUNTER — PATIENT MESSAGE (OUTPATIENT)
Dept: OBSTETRICS AND GYNECOLOGY | Facility: CLINIC | Age: 26
End: 2021-05-25

## 2021-05-25 ENCOUNTER — PATIENT OUTREACH (OUTPATIENT)
Dept: ADMINISTRATIVE | Facility: OTHER | Age: 26
End: 2021-05-25

## 2021-05-25 ENCOUNTER — PATIENT MESSAGE (OUTPATIENT)
Dept: INTERNAL MEDICINE | Facility: CLINIC | Age: 26
End: 2021-05-25

## 2021-05-27 ENCOUNTER — OFFICE VISIT (OUTPATIENT)
Dept: OBSTETRICS AND GYNECOLOGY | Facility: CLINIC | Age: 26
End: 2021-05-27
Payer: COMMERCIAL

## 2021-05-27 ENCOUNTER — LAB VISIT (OUTPATIENT)
Dept: LAB | Facility: HOSPITAL | Age: 26
End: 2021-05-27
Attending: STUDENT IN AN ORGANIZED HEALTH CARE EDUCATION/TRAINING PROGRAM
Payer: COMMERCIAL

## 2021-05-27 VITALS
SYSTOLIC BLOOD PRESSURE: 124 MMHG | BODY MASS INDEX: 29.94 KG/M2 | WEIGHT: 175.38 LBS | HEIGHT: 64 IN | RESPIRATION RATE: 12 BRPM | HEART RATE: 68 BPM | DIASTOLIC BLOOD PRESSURE: 76 MMHG

## 2021-05-27 DIAGNOSIS — N91.2 AMENORRHEA: ICD-10-CM

## 2021-05-27 DIAGNOSIS — N91.2 AMENORRHEA: Primary | ICD-10-CM

## 2021-05-27 LAB
B-HCG UR QL: POSITIVE
BASOPHILS # BLD AUTO: 0.04 K/UL (ref 0–0.2)
BASOPHILS NFR BLD: 0.4 % (ref 0–1.9)
CTP QC/QA: YES
DIFFERENTIAL METHOD: ABNORMAL
EOSINOPHIL # BLD AUTO: 0.1 K/UL (ref 0–0.5)
EOSINOPHIL NFR BLD: 0.7 % (ref 0–8)
ERYTHROCYTE [DISTWIDTH] IN BLOOD BY AUTOMATED COUNT: 17.8 % (ref 11.5–14.5)
HCT VFR BLD AUTO: 39.5 % (ref 37–48.5)
HGB BLD-MCNC: 12.5 G/DL (ref 12–16)
IMM GRANULOCYTES # BLD AUTO: 0.04 K/UL (ref 0–0.04)
IMM GRANULOCYTES NFR BLD AUTO: 0.4 % (ref 0–0.5)
LYMPHOCYTES # BLD AUTO: 2.6 K/UL (ref 1–4.8)
LYMPHOCYTES NFR BLD: 25.1 % (ref 18–48)
MCH RBC QN AUTO: 24 PG (ref 27–31)
MCHC RBC AUTO-ENTMCNC: 31.6 G/DL (ref 32–36)
MCV RBC AUTO: 76 FL (ref 82–98)
MONOCYTES # BLD AUTO: 0.4 K/UL (ref 0.3–1)
MONOCYTES NFR BLD: 4 % (ref 4–15)
NEUTROPHILS # BLD AUTO: 7.2 K/UL (ref 1.8–7.7)
NEUTROPHILS NFR BLD: 69.4 % (ref 38–73)
NRBC BLD-RTO: 0 /100 WBC
PLATELET # BLD AUTO: 269 K/UL (ref 150–450)
PMV BLD AUTO: 11.4 FL (ref 9.2–12.9)
RBC # BLD AUTO: 5.21 M/UL (ref 4–5.4)
WBC # BLD AUTO: 10.37 K/UL (ref 3.9–12.7)

## 2021-05-27 PROCEDURE — 87340 HEPATITIS B SURFACE AG IA: CPT | Performed by: STUDENT IN AN ORGANIZED HEALTH CARE EDUCATION/TRAINING PROGRAM

## 2021-05-27 PROCEDURE — 99999 PR PBB SHADOW E&M-EST. PATIENT-LVL III: CPT | Mod: PBBFAC,,, | Performed by: STUDENT IN AN ORGANIZED HEALTH CARE EDUCATION/TRAINING PROGRAM

## 2021-05-27 PROCEDURE — 1126F AMNT PAIN NOTED NONE PRSNT: CPT | Mod: S$GLB,,, | Performed by: STUDENT IN AN ORGANIZED HEALTH CARE EDUCATION/TRAINING PROGRAM

## 2021-05-27 PROCEDURE — 99214 OFFICE O/P EST MOD 30 MIN: CPT | Mod: 25,S$GLB,, | Performed by: STUDENT IN AN ORGANIZED HEALTH CARE EDUCATION/TRAINING PROGRAM

## 2021-05-27 PROCEDURE — 81025 POCT URINE PREGNANCY: ICD-10-PCS | Mod: S$GLB,,, | Performed by: STUDENT IN AN ORGANIZED HEALTH CARE EDUCATION/TRAINING PROGRAM

## 2021-05-27 PROCEDURE — 1126F PR PAIN SEVERITY QUANTIFIED, NO PAIN PRESENT: ICD-10-PCS | Mod: S$GLB,,, | Performed by: STUDENT IN AN ORGANIZED HEALTH CARE EDUCATION/TRAINING PROGRAM

## 2021-05-27 PROCEDURE — 87491 CHLMYD TRACH DNA AMP PROBE: CPT | Performed by: STUDENT IN AN ORGANIZED HEALTH CARE EDUCATION/TRAINING PROGRAM

## 2021-05-27 PROCEDURE — 86762 RUBELLA ANTIBODY: CPT | Performed by: STUDENT IN AN ORGANIZED HEALTH CARE EDUCATION/TRAINING PROGRAM

## 2021-05-27 PROCEDURE — 87086 URINE CULTURE/COLONY COUNT: CPT | Performed by: STUDENT IN AN ORGANIZED HEALTH CARE EDUCATION/TRAINING PROGRAM

## 2021-05-27 PROCEDURE — 3008F PR BODY MASS INDEX (BMI) DOCUMENTED: ICD-10-PCS | Mod: CPTII,S$GLB,, | Performed by: STUDENT IN AN ORGANIZED HEALTH CARE EDUCATION/TRAINING PROGRAM

## 2021-05-27 PROCEDURE — 87591 N.GONORRHOEAE DNA AMP PROB: CPT | Performed by: STUDENT IN AN ORGANIZED HEALTH CARE EDUCATION/TRAINING PROGRAM

## 2021-05-27 PROCEDURE — 85025 COMPLETE CBC W/AUTO DIFF WBC: CPT | Performed by: STUDENT IN AN ORGANIZED HEALTH CARE EDUCATION/TRAINING PROGRAM

## 2021-05-27 PROCEDURE — 36415 COLL VENOUS BLD VENIPUNCTURE: CPT | Performed by: STUDENT IN AN ORGANIZED HEALTH CARE EDUCATION/TRAINING PROGRAM

## 2021-05-27 PROCEDURE — 3008F BODY MASS INDEX DOCD: CPT | Mod: CPTII,S$GLB,, | Performed by: STUDENT IN AN ORGANIZED HEALTH CARE EDUCATION/TRAINING PROGRAM

## 2021-05-27 PROCEDURE — 99214 PR OFFICE/OUTPT VISIT, EST, LEVL IV, 30-39 MIN: ICD-10-PCS | Mod: 25,S$GLB,, | Performed by: STUDENT IN AN ORGANIZED HEALTH CARE EDUCATION/TRAINING PROGRAM

## 2021-05-27 PROCEDURE — 86592 SYPHILIS TEST NON-TREP QUAL: CPT | Performed by: STUDENT IN AN ORGANIZED HEALTH CARE EDUCATION/TRAINING PROGRAM

## 2021-05-27 PROCEDURE — 81025 URINE PREGNANCY TEST: CPT | Mod: S$GLB,,, | Performed by: STUDENT IN AN ORGANIZED HEALTH CARE EDUCATION/TRAINING PROGRAM

## 2021-05-27 PROCEDURE — 86703 HIV-1/HIV-2 1 RESULT ANTBDY: CPT | Performed by: STUDENT IN AN ORGANIZED HEALTH CARE EDUCATION/TRAINING PROGRAM

## 2021-05-27 PROCEDURE — 99999 PR PBB SHADOW E&M-EST. PATIENT-LVL III: ICD-10-PCS | Mod: PBBFAC,,, | Performed by: STUDENT IN AN ORGANIZED HEALTH CARE EDUCATION/TRAINING PROGRAM

## 2021-05-27 PROCEDURE — 81220 CFTR GENE COM VARIANTS: CPT | Performed by: STUDENT IN AN ORGANIZED HEALTH CARE EDUCATION/TRAINING PROGRAM

## 2021-05-28 LAB
BACTERIA UR CULT: NO GROWTH
HBV SURFACE AG SERPL QL IA: NEGATIVE
HIV 1+2 AB+HIV1 P24 AG SERPL QL IA: NEGATIVE
RPR SER QL: NORMAL
RUBV IGG SER-ACNC: 15.4 IU/ML
RUBV IGG SER-IMP: REACTIVE

## 2021-05-31 LAB
C TRACH DNA SPEC QL NAA+PROBE: NOT DETECTED
N GONORRHOEA DNA SPEC QL NAA+PROBE: NOT DETECTED

## 2021-06-01 LAB — CFTR MUT ANL BLD/T: NORMAL

## 2021-06-03 ENCOUNTER — PROCEDURE VISIT (OUTPATIENT)
Dept: OBSTETRICS AND GYNECOLOGY | Facility: CLINIC | Age: 26
End: 2021-06-03
Payer: COMMERCIAL

## 2021-06-03 DIAGNOSIS — Z36.89 ESTABLISH GESTATIONAL AGE, ULTRASOUND: ICD-10-CM

## 2021-06-03 DIAGNOSIS — N91.2 AMENORRHEA: ICD-10-CM

## 2021-06-03 PROCEDURE — 76801 OB US < 14 WKS SINGLE FETUS: CPT | Mod: S$GLB,,, | Performed by: OBSTETRICS & GYNECOLOGY

## 2021-06-03 PROCEDURE — 76801 PR US, OB <14WKS, TRANSABD, SINGLE GESTATION: ICD-10-PCS | Mod: S$GLB,,, | Performed by: OBSTETRICS & GYNECOLOGY

## 2021-06-24 ENCOUNTER — INITIAL PRENATAL (OUTPATIENT)
Dept: OBSTETRICS AND GYNECOLOGY | Facility: CLINIC | Age: 26
End: 2021-06-24
Payer: COMMERCIAL

## 2021-06-24 VITALS
HEART RATE: 84 BPM | BODY MASS INDEX: 31.07 KG/M2 | WEIGHT: 181 LBS | DIASTOLIC BLOOD PRESSURE: 72 MMHG | SYSTOLIC BLOOD PRESSURE: 116 MMHG

## 2021-06-24 DIAGNOSIS — Z3A.09 9 WEEKS GESTATION OF PREGNANCY: Primary | ICD-10-CM

## 2021-06-24 PROCEDURE — 0502F SUBSEQUENT PRENATAL CARE: CPT | Mod: CPTII,S$GLB,, | Performed by: STUDENT IN AN ORGANIZED HEALTH CARE EDUCATION/TRAINING PROGRAM

## 2021-06-24 PROCEDURE — 99999 PR PBB SHADOW E&M-EST. PATIENT-LVL II: CPT | Mod: PBBFAC,,, | Performed by: STUDENT IN AN ORGANIZED HEALTH CARE EDUCATION/TRAINING PROGRAM

## 2021-06-24 PROCEDURE — 99999 PR PBB SHADOW E&M-EST. PATIENT-LVL II: ICD-10-PCS | Mod: PBBFAC,,, | Performed by: STUDENT IN AN ORGANIZED HEALTH CARE EDUCATION/TRAINING PROGRAM

## 2021-06-24 PROCEDURE — 0502F PR SUBSEQUENT PRENATAL CARE: ICD-10-PCS | Mod: CPTII,S$GLB,, | Performed by: STUDENT IN AN ORGANIZED HEALTH CARE EDUCATION/TRAINING PROGRAM

## 2021-07-22 ENCOUNTER — ROUTINE PRENATAL (OUTPATIENT)
Dept: OBSTETRICS AND GYNECOLOGY | Facility: CLINIC | Age: 26
End: 2021-07-22
Payer: COMMERCIAL

## 2021-07-22 VITALS
DIASTOLIC BLOOD PRESSURE: 68 MMHG | SYSTOLIC BLOOD PRESSURE: 112 MMHG | WEIGHT: 181.38 LBS | HEART RATE: 88 BPM | BODY MASS INDEX: 31.14 KG/M2

## 2021-07-22 DIAGNOSIS — Z34.92 NORMAL PREGNANCY IN SECOND TRIMESTER: Primary | ICD-10-CM

## 2021-07-22 PROCEDURE — 0502F PR SUBSEQUENT PRENATAL CARE: ICD-10-PCS | Mod: CPTII,S$GLB,, | Performed by: STUDENT IN AN ORGANIZED HEALTH CARE EDUCATION/TRAINING PROGRAM

## 2021-07-22 PROCEDURE — 99999 PR PBB SHADOW E&M-EST. PATIENT-LVL II: CPT | Mod: PBBFAC,,, | Performed by: STUDENT IN AN ORGANIZED HEALTH CARE EDUCATION/TRAINING PROGRAM

## 2021-07-22 PROCEDURE — 0502F SUBSEQUENT PRENATAL CARE: CPT | Mod: CPTII,S$GLB,, | Performed by: STUDENT IN AN ORGANIZED HEALTH CARE EDUCATION/TRAINING PROGRAM

## 2021-07-22 PROCEDURE — 99999 PR PBB SHADOW E&M-EST. PATIENT-LVL II: ICD-10-PCS | Mod: PBBFAC,,, | Performed by: STUDENT IN AN ORGANIZED HEALTH CARE EDUCATION/TRAINING PROGRAM

## 2021-09-04 ENCOUNTER — NURSE TRIAGE (OUTPATIENT)
Dept: ADMINISTRATIVE | Facility: CLINIC | Age: 26
End: 2021-09-04

## 2021-09-06 ENCOUNTER — PATIENT MESSAGE (OUTPATIENT)
Dept: OBSTETRICS AND GYNECOLOGY | Facility: CLINIC | Age: 26
End: 2021-09-06

## 2021-09-10 ENCOUNTER — ROUTINE PRENATAL (OUTPATIENT)
Dept: OBSTETRICS AND GYNECOLOGY | Facility: CLINIC | Age: 26
End: 2021-09-10
Payer: COMMERCIAL

## 2021-09-10 ENCOUNTER — PROCEDURE VISIT (OUTPATIENT)
Dept: OBSTETRICS AND GYNECOLOGY | Facility: CLINIC | Age: 26
End: 2021-09-10
Payer: COMMERCIAL

## 2021-09-10 ENCOUNTER — LAB VISIT (OUTPATIENT)
Dept: LAB | Facility: HOSPITAL | Age: 26
End: 2021-09-10
Attending: STUDENT IN AN ORGANIZED HEALTH CARE EDUCATION/TRAINING PROGRAM
Payer: COMMERCIAL

## 2021-09-10 VITALS
HEART RATE: 94 BPM | DIASTOLIC BLOOD PRESSURE: 88 MMHG | WEIGHT: 182.88 LBS | BODY MASS INDEX: 31.39 KG/M2 | SYSTOLIC BLOOD PRESSURE: 118 MMHG

## 2021-09-10 DIAGNOSIS — Z3A.20 20 WEEKS GESTATION OF PREGNANCY: ICD-10-CM

## 2021-09-10 DIAGNOSIS — Z36.3 ENCOUNTER FOR ROUTINE SCREENING FOR MALFORMATION USING ULTRASONICS: ICD-10-CM

## 2021-09-10 DIAGNOSIS — Z34.92 NORMAL PREGNANCY IN SECOND TRIMESTER: ICD-10-CM

## 2021-09-10 DIAGNOSIS — Z34.92 NORMAL PREGNANCY IN SECOND TRIMESTER: Primary | ICD-10-CM

## 2021-09-10 PROBLEM — Z01.419 WELL WOMAN EXAM WITH ROUTINE GYNECOLOGICAL EXAM: Status: RESOLVED | Noted: 2021-02-15 | Resolved: 2021-09-10

## 2021-09-10 LAB
ABO + RH BLD: NORMAL
BLD GP AB SCN CELLS X3 SERPL QL: NORMAL

## 2021-09-10 PROCEDURE — 86900 BLOOD TYPING SEROLOGIC ABO: CPT | Performed by: OBSTETRICS & GYNECOLOGY

## 2021-09-10 PROCEDURE — 36415 COLL VENOUS BLD VENIPUNCTURE: CPT | Performed by: OBSTETRICS & GYNECOLOGY

## 2021-09-10 PROCEDURE — 0502F PR SUBSEQUENT PRENATAL CARE: ICD-10-PCS | Mod: CPTII,S$GLB,, | Performed by: OBSTETRICS & GYNECOLOGY

## 2021-09-10 PROCEDURE — 76805 OB US >/= 14 WKS SNGL FETUS: CPT | Mod: S$GLB,,, | Performed by: OBSTETRICS & GYNECOLOGY

## 2021-09-10 PROCEDURE — 99999 PR PBB SHADOW E&M-EST. PATIENT-LVL II: CPT | Mod: PBBFAC,,,

## 2021-09-10 PROCEDURE — 0502F SUBSEQUENT PRENATAL CARE: CPT | Mod: CPTII,S$GLB,, | Performed by: OBSTETRICS & GYNECOLOGY

## 2021-09-10 PROCEDURE — 76805 PR US, OB 14+WKS, TRANSABD, SINGLE GESTATION: ICD-10-PCS | Mod: S$GLB,,, | Performed by: OBSTETRICS & GYNECOLOGY

## 2021-09-10 PROCEDURE — 99999 PR PBB SHADOW E&M-EST. PATIENT-LVL II: ICD-10-PCS | Mod: PBBFAC,,,

## 2021-10-04 ENCOUNTER — LAB VISIT (OUTPATIENT)
Dept: LAB | Facility: HOSPITAL | Age: 26
End: 2021-10-04
Attending: INTERNAL MEDICINE
Payer: COMMERCIAL

## 2021-10-04 ENCOUNTER — ROUTINE PRENATAL (OUTPATIENT)
Dept: OBSTETRICS AND GYNECOLOGY | Facility: CLINIC | Age: 26
End: 2021-10-04
Payer: COMMERCIAL

## 2021-10-04 VITALS
HEART RATE: 88 BPM | WEIGHT: 191 LBS | DIASTOLIC BLOOD PRESSURE: 76 MMHG | BODY MASS INDEX: 32.79 KG/M2 | SYSTOLIC BLOOD PRESSURE: 122 MMHG

## 2021-10-04 DIAGNOSIS — Z34.92 NORMAL PREGNANCY IN SECOND TRIMESTER: Primary | ICD-10-CM

## 2021-10-04 DIAGNOSIS — Z34.92 NORMAL PREGNANCY IN SECOND TRIMESTER: ICD-10-CM

## 2021-10-04 LAB
BASOPHILS # BLD AUTO: 0.05 K/UL (ref 0–0.2)
BASOPHILS NFR BLD: 0.4 % (ref 0–1.9)
DIFFERENTIAL METHOD: ABNORMAL
EOSINOPHIL # BLD AUTO: 0.1 K/UL (ref 0–0.5)
EOSINOPHIL NFR BLD: 0.8 % (ref 0–8)
ERYTHROCYTE [DISTWIDTH] IN BLOOD BY AUTOMATED COUNT: 16.4 % (ref 11.5–14.5)
GLUCOSE SERPL-MCNC: 94 MG/DL (ref 70–140)
HCT VFR BLD AUTO: 36.3 % (ref 37–48.5)
HGB BLD-MCNC: 11.5 G/DL (ref 12–16)
IMM GRANULOCYTES # BLD AUTO: 0.07 K/UL (ref 0–0.04)
IMM GRANULOCYTES NFR BLD AUTO: 0.6 % (ref 0–0.5)
LYMPHOCYTES # BLD AUTO: 2.1 K/UL (ref 1–4.8)
LYMPHOCYTES NFR BLD: 16.5 % (ref 18–48)
MCH RBC QN AUTO: 24.7 PG (ref 27–31)
MCHC RBC AUTO-ENTMCNC: 31.7 G/DL (ref 32–36)
MCV RBC AUTO: 78 FL (ref 82–98)
MONOCYTES # BLD AUTO: 0.5 K/UL (ref 0.3–1)
MONOCYTES NFR BLD: 4 % (ref 4–15)
NEUTROPHILS # BLD AUTO: 9.7 K/UL (ref 1.8–7.7)
NEUTROPHILS NFR BLD: 77.7 % (ref 38–73)
NRBC BLD-RTO: 0 /100 WBC
PLATELET # BLD AUTO: 244 K/UL (ref 150–450)
PMV BLD AUTO: 11.6 FL (ref 9.2–12.9)
RBC # BLD AUTO: 4.66 M/UL (ref 4–5.4)
RPR SER QL: NORMAL
WBC # BLD AUTO: 12.46 K/UL (ref 3.9–12.7)

## 2021-10-04 PROCEDURE — 36415 COLL VENOUS BLD VENIPUNCTURE: CPT | Performed by: STUDENT IN AN ORGANIZED HEALTH CARE EDUCATION/TRAINING PROGRAM

## 2021-10-04 PROCEDURE — 99999 PR PBB SHADOW E&M-EST. PATIENT-LVL II: CPT | Mod: PBBFAC,,, | Performed by: STUDENT IN AN ORGANIZED HEALTH CARE EDUCATION/TRAINING PROGRAM

## 2021-10-04 PROCEDURE — 99999 PR PBB SHADOW E&M-EST. PATIENT-LVL II: ICD-10-PCS | Mod: PBBFAC,,, | Performed by: STUDENT IN AN ORGANIZED HEALTH CARE EDUCATION/TRAINING PROGRAM

## 2021-10-04 PROCEDURE — 0502F SUBSEQUENT PRENATAL CARE: CPT | Mod: CPTII,S$GLB,, | Performed by: STUDENT IN AN ORGANIZED HEALTH CARE EDUCATION/TRAINING PROGRAM

## 2021-10-04 PROCEDURE — 82950 GLUCOSE TEST: CPT | Performed by: STUDENT IN AN ORGANIZED HEALTH CARE EDUCATION/TRAINING PROGRAM

## 2021-10-04 PROCEDURE — 87389 HIV-1 AG W/HIV-1&-2 AB AG IA: CPT | Performed by: STUDENT IN AN ORGANIZED HEALTH CARE EDUCATION/TRAINING PROGRAM

## 2021-10-04 PROCEDURE — 85025 COMPLETE CBC W/AUTO DIFF WBC: CPT | Performed by: STUDENT IN AN ORGANIZED HEALTH CARE EDUCATION/TRAINING PROGRAM

## 2021-10-04 PROCEDURE — 0502F PR SUBSEQUENT PRENATAL CARE: ICD-10-PCS | Mod: CPTII,S$GLB,, | Performed by: STUDENT IN AN ORGANIZED HEALTH CARE EDUCATION/TRAINING PROGRAM

## 2021-10-04 PROCEDURE — 86592 SYPHILIS TEST NON-TREP QUAL: CPT | Performed by: STUDENT IN AN ORGANIZED HEALTH CARE EDUCATION/TRAINING PROGRAM

## 2021-10-05 LAB — HIV 1+2 AB+HIV1 P24 AG SERPL QL IA: NEGATIVE

## 2021-10-21 ENCOUNTER — TELEPHONE (OUTPATIENT)
Dept: OBSTETRICS AND GYNECOLOGY | Facility: CLINIC | Age: 26
End: 2021-10-21

## 2021-10-21 ENCOUNTER — PATIENT MESSAGE (OUTPATIENT)
Dept: OBSTETRICS AND GYNECOLOGY | Facility: CLINIC | Age: 26
End: 2021-10-21
Payer: COMMERCIAL

## 2021-10-25 ENCOUNTER — TELEPHONE (OUTPATIENT)
Dept: OBSTETRICS AND GYNECOLOGY | Facility: CLINIC | Age: 26
End: 2021-10-25
Payer: COMMERCIAL

## 2021-11-01 ENCOUNTER — HOSPITAL ENCOUNTER (EMERGENCY)
Facility: HOSPITAL | Age: 26
Discharge: HOME | End: 2021-11-01
Attending: SURGERY
Payer: COMMERCIAL

## 2021-11-01 ENCOUNTER — ROUTINE PRENATAL (OUTPATIENT)
Dept: OBSTETRICS AND GYNECOLOGY | Facility: CLINIC | Age: 26
End: 2021-11-01
Payer: COMMERCIAL

## 2021-11-01 VITALS
WEIGHT: 185.63 LBS | HEART RATE: 108 BPM | BODY MASS INDEX: 31.86 KG/M2 | RESPIRATION RATE: 18 BRPM | OXYGEN SATURATION: 99 % | DIASTOLIC BLOOD PRESSURE: 84 MMHG | TEMPERATURE: 97 F | SYSTOLIC BLOOD PRESSURE: 133 MMHG

## 2021-11-01 VITALS
SYSTOLIC BLOOD PRESSURE: 118 MMHG | WEIGHT: 180.69 LBS | DIASTOLIC BLOOD PRESSURE: 74 MMHG | HEART RATE: 82 BPM | BODY MASS INDEX: 31.02 KG/M2

## 2021-11-01 DIAGNOSIS — K21.9 GERD (GASTROESOPHAGEAL REFLUX DISEASE): ICD-10-CM

## 2021-11-01 DIAGNOSIS — Z34.93 NORMAL PREGNANCY IN THIRD TRIMESTER: Primary | ICD-10-CM

## 2021-11-01 LAB
ALBUMIN SERPL BCP-MCNC: 3.2 G/DL (ref 3.5–5.2)
ALP SERPL-CCNC: 93 U/L (ref 55–135)
ALT SERPL W/O P-5'-P-CCNC: 18 U/L (ref 10–44)
ANION GAP SERPL CALC-SCNC: 9 MMOL/L (ref 8–16)
AST SERPL-CCNC: 12 U/L (ref 10–40)
B-HCG UR QL: POSITIVE
BASOPHILS # BLD AUTO: 0.04 K/UL (ref 0–0.2)
BASOPHILS NFR BLD: 0.3 % (ref 0–1.9)
BILIRUB SERPL-MCNC: 0.1 MG/DL (ref 0.1–1)
BILIRUB UR QL STRIP: NEGATIVE
BILIRUB UR QL STRIP: NEGATIVE
BNP SERPL-MCNC: <10 PG/ML (ref 0–99)
BUN SERPL-MCNC: 7 MG/DL (ref 6–20)
CALCIUM SERPL-MCNC: 9.5 MG/DL (ref 8.7–10.5)
CHLORIDE SERPL-SCNC: 105 MMOL/L (ref 95–110)
CK MB SERPL-MCNC: 0.5 NG/ML (ref 0.1–6.5)
CK MB SERPL-RTO: 1.4 % (ref 0–5)
CK SERPL-CCNC: 35 U/L (ref 20–180)
CK SERPL-CCNC: 35 U/L (ref 20–180)
CLARITY UR: CLEAR
CLARITY UR: CLEAR
CO2 SERPL-SCNC: 24 MMOL/L (ref 23–29)
COLOR UR: YELLOW
COLOR UR: YELLOW
CREAT SERPL-MCNC: 0.6 MG/DL (ref 0.5–1.4)
D DIMER PPP IA.FEU-MCNC: 0.71 MG/L FEU
DIFFERENTIAL METHOD: ABNORMAL
EOSINOPHIL # BLD AUTO: 0.1 K/UL (ref 0–0.5)
EOSINOPHIL NFR BLD: 0.6 % (ref 0–8)
ERYTHROCYTE [DISTWIDTH] IN BLOOD BY AUTOMATED COUNT: 15.9 % (ref 11.5–14.5)
EST. GFR  (AFRICAN AMERICAN): >60 ML/MIN/1.73 M^2
EST. GFR  (NON AFRICAN AMERICAN): >60 ML/MIN/1.73 M^2
GLUCOSE SERPL-MCNC: 82 MG/DL (ref 70–110)
GLUCOSE UR QL STRIP: NEGATIVE
GLUCOSE UR QL STRIP: NEGATIVE
HCT VFR BLD AUTO: 33.5 % (ref 37–48.5)
HGB BLD-MCNC: 10.8 G/DL (ref 12–16)
HGB UR QL STRIP: NEGATIVE
HGB UR QL STRIP: NEGATIVE
IMM GRANULOCYTES # BLD AUTO: 0.07 K/UL (ref 0–0.04)
IMM GRANULOCYTES NFR BLD AUTO: 0.6 % (ref 0–0.5)
KETONES UR QL STRIP: NEGATIVE
KETONES UR QL STRIP: NEGATIVE
LEUKOCYTE ESTERASE UR QL STRIP: NEGATIVE
LEUKOCYTE ESTERASE UR QL STRIP: NEGATIVE
LIPASE SERPL-CCNC: 26 U/L (ref 4–60)
LYMPHOCYTES # BLD AUTO: 2.2 K/UL (ref 1–4.8)
LYMPHOCYTES NFR BLD: 17.3 % (ref 18–48)
MCH RBC QN AUTO: 24.6 PG (ref 27–31)
MCHC RBC AUTO-ENTMCNC: 32.2 G/DL (ref 32–36)
MCV RBC AUTO: 76 FL (ref 82–98)
MONOCYTES # BLD AUTO: 0.5 K/UL (ref 0.3–1)
MONOCYTES NFR BLD: 4.2 % (ref 4–15)
NEUTROPHILS # BLD AUTO: 9.6 K/UL (ref 1.8–7.7)
NEUTROPHILS NFR BLD: 77 % (ref 38–73)
NITRITE UR QL STRIP: NEGATIVE
NITRITE UR QL STRIP: NEGATIVE
NRBC BLD-RTO: 0 /100 WBC
PH UR STRIP: 7 [PH] (ref 5–8)
PH UR STRIP: 7 [PH] (ref 5–8)
PLATELET # BLD AUTO: 245 K/UL (ref 150–450)
PMV BLD AUTO: 12 FL (ref 9.2–12.9)
POTASSIUM SERPL-SCNC: 3.6 MMOL/L (ref 3.5–5.1)
PROT SERPL-MCNC: 7.4 G/DL (ref 6–8.4)
PROT UR QL STRIP: NEGATIVE
PROT UR QL STRIP: NEGATIVE
RBC # BLD AUTO: 4.39 M/UL (ref 4–5.4)
SODIUM SERPL-SCNC: 138 MMOL/L (ref 136–145)
SP GR UR STRIP: >=1.03 (ref 1–1.03)
SP GR UR STRIP: >=1.03 (ref 1–1.03)
TROPONIN I SERPL DL<=0.01 NG/ML-MCNC: <0.006 NG/ML (ref 0–0.03)
URN SPEC COLLECT METH UR: ABNORMAL
URN SPEC COLLECT METH UR: ABNORMAL
UROBILINOGEN UR STRIP-ACNC: NEGATIVE EU/DL
UROBILINOGEN UR STRIP-ACNC: NEGATIVE EU/DL
WBC # BLD AUTO: 12.52 K/UL (ref 3.9–12.7)

## 2021-11-01 PROCEDURE — 0502F SUBSEQUENT PRENATAL CARE: CPT | Mod: CPTII,S$GLB,, | Performed by: STUDENT IN AN ORGANIZED HEALTH CARE EDUCATION/TRAINING PROGRAM

## 2021-11-01 PROCEDURE — 85025 COMPLETE CBC W/AUTO DIFF WBC: CPT | Performed by: SURGERY

## 2021-11-01 PROCEDURE — 82553 CREATINE MB FRACTION: CPT | Performed by: SURGERY

## 2021-11-01 PROCEDURE — 0502F PR SUBSEQUENT PRENATAL CARE: ICD-10-PCS | Mod: CPTII,S$GLB,, | Performed by: STUDENT IN AN ORGANIZED HEALTH CARE EDUCATION/TRAINING PROGRAM

## 2021-11-01 PROCEDURE — 83880 ASSAY OF NATRIURETIC PEPTIDE: CPT | Performed by: SURGERY

## 2021-11-01 PROCEDURE — 99999 PR PBB SHADOW E&M-EST. PATIENT-LVL II: CPT | Mod: PBBFAC,,, | Performed by: STUDENT IN AN ORGANIZED HEALTH CARE EDUCATION/TRAINING PROGRAM

## 2021-11-01 PROCEDURE — 85379 FIBRIN DEGRADATION QUANT: CPT | Performed by: SURGERY

## 2021-11-01 PROCEDURE — 99285 EMERGENCY DEPT VISIT HI MDM: CPT | Mod: 25

## 2021-11-01 PROCEDURE — 81025 URINE PREGNANCY TEST: CPT | Performed by: SURGERY

## 2021-11-01 PROCEDURE — 99999 PR PBB SHADOW E&M-EST. PATIENT-LVL II: ICD-10-PCS | Mod: PBBFAC,,, | Performed by: STUDENT IN AN ORGANIZED HEALTH CARE EDUCATION/TRAINING PROGRAM

## 2021-11-01 PROCEDURE — 93005 ELECTROCARDIOGRAM TRACING: CPT

## 2021-11-01 PROCEDURE — 80053 COMPREHEN METABOLIC PANEL: CPT | Performed by: SURGERY

## 2021-11-01 PROCEDURE — 84484 ASSAY OF TROPONIN QUANT: CPT | Performed by: SURGERY

## 2021-11-01 PROCEDURE — 81003 URINALYSIS AUTO W/O SCOPE: CPT | Performed by: SURGERY

## 2021-11-01 PROCEDURE — 83690 ASSAY OF LIPASE: CPT | Performed by: SURGERY

## 2021-11-01 PROCEDURE — 36415 COLL VENOUS BLD VENIPUNCTURE: CPT | Performed by: SURGERY

## 2021-11-01 PROCEDURE — 93010 EKG 12-LEAD: ICD-10-PCS | Mod: ,,, | Performed by: INTERNAL MEDICINE

## 2021-11-01 PROCEDURE — 93010 ELECTROCARDIOGRAM REPORT: CPT | Mod: ,,, | Performed by: INTERNAL MEDICINE

## 2021-11-01 RX ORDER — FAMOTIDINE 20 MG/1
20 TABLET, FILM COATED ORAL 2 TIMES DAILY
Qty: 60 TABLET | Refills: 11 | Status: SHIPPED | OUTPATIENT
Start: 2021-11-01 | End: 2022-04-21

## 2021-11-15 ENCOUNTER — PROCEDURE VISIT (OUTPATIENT)
Dept: OBSTETRICS AND GYNECOLOGY | Facility: CLINIC | Age: 26
End: 2021-11-15
Payer: COMMERCIAL

## 2021-11-15 ENCOUNTER — TELEPHONE (OUTPATIENT)
Dept: OBSTETRICS AND GYNECOLOGY | Facility: CLINIC | Age: 26
End: 2021-11-15
Payer: COMMERCIAL

## 2021-11-15 ENCOUNTER — ROUTINE PRENATAL (OUTPATIENT)
Dept: OBSTETRICS AND GYNECOLOGY | Facility: CLINIC | Age: 26
End: 2021-11-15
Payer: COMMERCIAL

## 2021-11-15 VITALS
HEART RATE: 86 BPM | BODY MASS INDEX: 31.07 KG/M2 | DIASTOLIC BLOOD PRESSURE: 70 MMHG | SYSTOLIC BLOOD PRESSURE: 112 MMHG | WEIGHT: 181 LBS

## 2021-11-15 DIAGNOSIS — Z36.4 SCREENING, ANTENATAL FOR FETAL GROWTH RETARDATION WITH ULTRASOUND: Primary | ICD-10-CM

## 2021-11-15 DIAGNOSIS — Z36.4 SCREENING, ANTENATAL FOR FETAL GROWTH RETARDATION WITH ULTRASOUND: ICD-10-CM

## 2021-11-15 DIAGNOSIS — Z34.93 NORMAL PREGNANCY IN THIRD TRIMESTER: Primary | ICD-10-CM

## 2021-11-15 PROCEDURE — 99999 PR PBB SHADOW E&M-EST. PATIENT-LVL II: CPT | Mod: PBBFAC,,, | Performed by: STUDENT IN AN ORGANIZED HEALTH CARE EDUCATION/TRAINING PROGRAM

## 2021-11-15 PROCEDURE — 0502F SUBSEQUENT PRENATAL CARE: CPT | Mod: CPTII,S$GLB,, | Performed by: STUDENT IN AN ORGANIZED HEALTH CARE EDUCATION/TRAINING PROGRAM

## 2021-11-15 PROCEDURE — 99999 PR PBB SHADOW E&M-EST. PATIENT-LVL II: ICD-10-PCS | Mod: PBBFAC,,, | Performed by: STUDENT IN AN ORGANIZED HEALTH CARE EDUCATION/TRAINING PROGRAM

## 2021-11-15 PROCEDURE — 0502F PR SUBSEQUENT PRENATAL CARE: ICD-10-PCS | Mod: CPTII,S$GLB,, | Performed by: STUDENT IN AN ORGANIZED HEALTH CARE EDUCATION/TRAINING PROGRAM

## 2021-11-15 PROCEDURE — 76816 OB US FOLLOW-UP PER FETUS: CPT | Mod: S$GLB,,, | Performed by: OBSTETRICS & GYNECOLOGY

## 2021-11-15 PROCEDURE — 76816 US OB/GYN EXTENDED PROCEDURE (VIEWPOINT): ICD-10-PCS | Mod: S$GLB,,, | Performed by: OBSTETRICS & GYNECOLOGY

## 2021-12-01 ENCOUNTER — ROUTINE PRENATAL (OUTPATIENT)
Dept: OBSTETRICS AND GYNECOLOGY | Facility: CLINIC | Age: 26
End: 2021-12-01
Payer: COMMERCIAL

## 2021-12-01 VITALS
WEIGHT: 182 LBS | DIASTOLIC BLOOD PRESSURE: 72 MMHG | HEART RATE: 92 BPM | BODY MASS INDEX: 31.24 KG/M2 | SYSTOLIC BLOOD PRESSURE: 114 MMHG

## 2021-12-01 DIAGNOSIS — Z34.93 NORMAL PREGNANCY IN THIRD TRIMESTER: Primary | ICD-10-CM

## 2021-12-01 PROCEDURE — 99999 PR PBB SHADOW E&M-EST. PATIENT-LVL II: ICD-10-PCS | Mod: PBBFAC,,, | Performed by: STUDENT IN AN ORGANIZED HEALTH CARE EDUCATION/TRAINING PROGRAM

## 2021-12-01 PROCEDURE — 0502F PR SUBSEQUENT PRENATAL CARE: ICD-10-PCS | Mod: CPTII,S$GLB,, | Performed by: STUDENT IN AN ORGANIZED HEALTH CARE EDUCATION/TRAINING PROGRAM

## 2021-12-01 PROCEDURE — 99999 PR PBB SHADOW E&M-EST. PATIENT-LVL II: CPT | Mod: PBBFAC,,, | Performed by: STUDENT IN AN ORGANIZED HEALTH CARE EDUCATION/TRAINING PROGRAM

## 2021-12-01 PROCEDURE — 0502F SUBSEQUENT PRENATAL CARE: CPT | Mod: CPTII,S$GLB,, | Performed by: STUDENT IN AN ORGANIZED HEALTH CARE EDUCATION/TRAINING PROGRAM

## 2021-12-06 ENCOUNTER — HOSPITAL ENCOUNTER (EMERGENCY)
Facility: HOSPITAL | Age: 26
Discharge: HOME | End: 2021-12-06
Attending: STUDENT IN AN ORGANIZED HEALTH CARE EDUCATION/TRAINING PROGRAM
Payer: COMMERCIAL

## 2021-12-06 VITALS
BODY MASS INDEX: 32.28 KG/M2 | WEIGHT: 188.06 LBS | RESPIRATION RATE: 20 BRPM | SYSTOLIC BLOOD PRESSURE: 121 MMHG | HEART RATE: 74 BPM | TEMPERATURE: 100 F | DIASTOLIC BLOOD PRESSURE: 71 MMHG | OXYGEN SATURATION: 99 %

## 2021-12-06 DIAGNOSIS — L02.416 ABSCESS OF LEFT THIGH: Primary | ICD-10-CM

## 2021-12-06 PROCEDURE — 25000003 PHARM REV CODE 250: Performed by: STUDENT IN AN ORGANIZED HEALTH CARE EDUCATION/TRAINING PROGRAM

## 2021-12-06 PROCEDURE — 10060 I&D ABSCESS SIMPLE/SINGLE: CPT

## 2021-12-06 PROCEDURE — 99283 EMERGENCY DEPT VISIT LOW MDM: CPT | Mod: 25

## 2021-12-06 RX ORDER — LIDOCAINE HYDROCHLORIDE 10 MG/ML
10 INJECTION, SOLUTION EPIDURAL; INFILTRATION; INTRACAUDAL; PERINEURAL
Status: COMPLETED | OUTPATIENT
Start: 2021-12-06 | End: 2021-12-06

## 2021-12-06 RX ORDER — ACETAMINOPHEN 325 MG/1
650 TABLET ORAL
Status: COMPLETED | OUTPATIENT
Start: 2021-12-06 | End: 2021-12-06

## 2021-12-06 RX ORDER — CEPHALEXIN 500 MG/1
500 CAPSULE ORAL 4 TIMES DAILY
Qty: 20 CAPSULE | Refills: 0 | Status: SHIPPED | OUTPATIENT
Start: 2021-12-06 | End: 2021-12-11

## 2021-12-06 RX ADMIN — ACETAMINOPHEN 650 MG: 325 TABLET ORAL at 08:12

## 2021-12-06 RX ADMIN — LIDOCAINE HYDROCHLORIDE 100 MG: 10 INJECTION, SOLUTION EPIDURAL; INFILTRATION; INTRACAUDAL; PERINEURAL at 09:12

## 2021-12-09 ENCOUNTER — OFFICE VISIT (OUTPATIENT)
Dept: INTERNAL MEDICINE | Facility: CLINIC | Age: 26
End: 2021-12-09
Payer: COMMERCIAL

## 2021-12-09 VITALS
WEIGHT: 188.5 LBS | OXYGEN SATURATION: 98 % | HEART RATE: 88 BPM | SYSTOLIC BLOOD PRESSURE: 120 MMHG | DIASTOLIC BLOOD PRESSURE: 82 MMHG | BODY MASS INDEX: 32.18 KG/M2 | HEIGHT: 64 IN | RESPIRATION RATE: 18 BRPM

## 2021-12-09 DIAGNOSIS — L02.214 ABSCESS OF LEFT GROIN: Primary | ICD-10-CM

## 2021-12-09 DIAGNOSIS — Z3A.33 33 WEEKS GESTATION OF PREGNANCY: ICD-10-CM

## 2021-12-09 PROCEDURE — 99213 PR OFFICE/OUTPT VISIT, EST, LEVL III, 20-29 MIN: ICD-10-PCS | Mod: S$GLB,,, | Performed by: NURSE PRACTITIONER

## 2021-12-09 PROCEDURE — 99213 OFFICE O/P EST LOW 20 MIN: CPT | Mod: S$GLB,,, | Performed by: NURSE PRACTITIONER

## 2021-12-09 PROCEDURE — 99999 PR PBB SHADOW E&M-EST. PATIENT-LVL III: CPT | Mod: PBBFAC,,, | Performed by: NURSE PRACTITIONER

## 2021-12-09 PROCEDURE — 99999 PR PBB SHADOW E&M-EST. PATIENT-LVL III: ICD-10-PCS | Mod: PBBFAC,,, | Performed by: NURSE PRACTITIONER

## 2021-12-09 RX ORDER — MUPIROCIN 20 MG/G
OINTMENT TOPICAL 2 TIMES DAILY
Qty: 30 G | Refills: 0 | Status: SHIPPED | OUTPATIENT
Start: 2021-12-09 | End: 2022-04-21

## 2021-12-13 ENCOUNTER — ROUTINE PRENATAL (OUTPATIENT)
Dept: OBSTETRICS AND GYNECOLOGY | Facility: CLINIC | Age: 26
End: 2021-12-13
Payer: COMMERCIAL

## 2021-12-13 VITALS
HEART RATE: 86 BPM | DIASTOLIC BLOOD PRESSURE: 74 MMHG | SYSTOLIC BLOOD PRESSURE: 122 MMHG | WEIGHT: 186.5 LBS | BODY MASS INDEX: 32.01 KG/M2

## 2021-12-13 DIAGNOSIS — Z34.93 NORMAL PREGNANCY IN THIRD TRIMESTER: Primary | ICD-10-CM

## 2021-12-13 DIAGNOSIS — O99.513 ASTHMA AFFECTING PREGNANCY IN THIRD TRIMESTER: ICD-10-CM

## 2021-12-13 DIAGNOSIS — J45.909 ASTHMA AFFECTING PREGNANCY IN THIRD TRIMESTER: ICD-10-CM

## 2021-12-13 PROCEDURE — 0502F SUBSEQUENT PRENATAL CARE: CPT | Mod: CPTII,S$GLB,, | Performed by: STUDENT IN AN ORGANIZED HEALTH CARE EDUCATION/TRAINING PROGRAM

## 2021-12-13 PROCEDURE — 99999 PR PBB SHADOW E&M-EST. PATIENT-LVL II: ICD-10-PCS | Mod: PBBFAC,,, | Performed by: STUDENT IN AN ORGANIZED HEALTH CARE EDUCATION/TRAINING PROGRAM

## 2021-12-13 PROCEDURE — 0502F PR SUBSEQUENT PRENATAL CARE: ICD-10-PCS | Mod: CPTII,S$GLB,, | Performed by: STUDENT IN AN ORGANIZED HEALTH CARE EDUCATION/TRAINING PROGRAM

## 2021-12-13 PROCEDURE — 99999 PR PBB SHADOW E&M-EST. PATIENT-LVL II: CPT | Mod: PBBFAC,,, | Performed by: STUDENT IN AN ORGANIZED HEALTH CARE EDUCATION/TRAINING PROGRAM

## 2021-12-13 RX ORDER — ALBUTEROL SULFATE 90 UG/1
1 AEROSOL, METERED RESPIRATORY (INHALATION) EVERY 4 HOURS PRN
Qty: 6.7 G | Refills: 5 | Status: SHIPPED | OUTPATIENT
Start: 2021-12-13 | End: 2021-12-22 | Stop reason: SDUPTHER

## 2021-12-15 ENCOUNTER — HOSPITAL ENCOUNTER (EMERGENCY)
Facility: HOSPITAL | Age: 26
Discharge: HOME | End: 2021-12-15
Attending: STUDENT IN AN ORGANIZED HEALTH CARE EDUCATION/TRAINING PROGRAM
Payer: COMMERCIAL

## 2021-12-15 ENCOUNTER — PATIENT MESSAGE (OUTPATIENT)
Dept: OBSTETRICS AND GYNECOLOGY | Facility: CLINIC | Age: 26
End: 2021-12-15
Payer: COMMERCIAL

## 2021-12-15 VITALS
RESPIRATION RATE: 18 BRPM | DIASTOLIC BLOOD PRESSURE: 79 MMHG | WEIGHT: 188.25 LBS | BODY MASS INDEX: 32.32 KG/M2 | TEMPERATURE: 97 F | HEART RATE: 84 BPM | OXYGEN SATURATION: 100 % | SYSTOLIC BLOOD PRESSURE: 121 MMHG

## 2021-12-15 DIAGNOSIS — R00.2 PALPITATIONS: ICD-10-CM

## 2021-12-15 LAB
ALBUMIN SERPL BCP-MCNC: 2.8 G/DL (ref 3.5–5.2)
ALP SERPL-CCNC: 136 U/L (ref 55–135)
ALT SERPL W/O P-5'-P-CCNC: 17 U/L (ref 10–44)
ANION GAP SERPL CALC-SCNC: 8 MMOL/L (ref 8–16)
AST SERPL-CCNC: 15 U/L (ref 10–40)
BASOPHILS # BLD AUTO: 0.05 K/UL (ref 0–0.2)
BASOPHILS NFR BLD: 0.6 % (ref 0–1.9)
BILIRUB SERPL-MCNC: 0.1 MG/DL (ref 0.1–1)
BNP SERPL-MCNC: 29 PG/ML (ref 0–99)
BUN SERPL-MCNC: 6 MG/DL (ref 6–20)
CALCIUM SERPL-MCNC: 9.2 MG/DL (ref 8.7–10.5)
CHLORIDE SERPL-SCNC: 106 MMOL/L (ref 95–110)
CO2 SERPL-SCNC: 25 MMOL/L (ref 23–29)
CREAT SERPL-MCNC: 0.6 MG/DL (ref 0.5–1.4)
D DIMER PPP IA.FEU-MCNC: 0.73 MG/L FEU
DIFFERENTIAL METHOD: ABNORMAL
EOSINOPHIL # BLD AUTO: 0.1 K/UL (ref 0–0.5)
EOSINOPHIL NFR BLD: 1.2 % (ref 0–8)
ERYTHROCYTE [DISTWIDTH] IN BLOOD BY AUTOMATED COUNT: 16.6 % (ref 11.5–14.5)
EST. GFR  (AFRICAN AMERICAN): >60 ML/MIN/1.73 M^2
EST. GFR  (NON AFRICAN AMERICAN): >60 ML/MIN/1.73 M^2
GLUCOSE SERPL-MCNC: 74 MG/DL (ref 70–110)
HCT VFR BLD AUTO: 34.2 % (ref 37–48.5)
HGB BLD-MCNC: 10.6 G/DL (ref 12–16)
IMM GRANULOCYTES # BLD AUTO: 0.05 K/UL (ref 0–0.04)
IMM GRANULOCYTES NFR BLD AUTO: 0.6 % (ref 0–0.5)
LYMPHOCYTES # BLD AUTO: 1.9 K/UL (ref 1–4.8)
LYMPHOCYTES NFR BLD: 21.5 % (ref 18–48)
MAGNESIUM SERPL-MCNC: 1.8 MG/DL (ref 1.6–2.6)
MCH RBC QN AUTO: 23.3 PG (ref 27–31)
MCHC RBC AUTO-ENTMCNC: 31 G/DL (ref 32–36)
MCV RBC AUTO: 75 FL (ref 82–98)
MONOCYTES # BLD AUTO: 0.6 K/UL (ref 0.3–1)
MONOCYTES NFR BLD: 6.3 % (ref 4–15)
NEUTROPHILS # BLD AUTO: 6.3 K/UL (ref 1.8–7.7)
NEUTROPHILS NFR BLD: 69.8 % (ref 38–73)
NRBC BLD-RTO: 0 /100 WBC
PHOSPHATE SERPL-MCNC: 3.8 MG/DL (ref 2.7–4.5)
PLATELET # BLD AUTO: 195 K/UL (ref 150–450)
PMV BLD AUTO: 12.2 FL (ref 9.2–12.9)
POTASSIUM SERPL-SCNC: 3.8 MMOL/L (ref 3.5–5.1)
PROT SERPL-MCNC: 7.3 G/DL (ref 6–8.4)
RBC # BLD AUTO: 4.54 M/UL (ref 4–5.4)
SODIUM SERPL-SCNC: 139 MMOL/L (ref 136–145)
TROPONIN I SERPL DL<=0.01 NG/ML-MCNC: <0.006 NG/ML (ref 0–0.03)
TSH SERPL DL<=0.005 MIU/L-ACNC: 1.25 UIU/ML (ref 0.4–4)
WBC # BLD AUTO: 8.99 K/UL (ref 3.9–12.7)

## 2021-12-15 PROCEDURE — 84484 ASSAY OF TROPONIN QUANT: CPT | Performed by: STUDENT IN AN ORGANIZED HEALTH CARE EDUCATION/TRAINING PROGRAM

## 2021-12-15 PROCEDURE — 83880 ASSAY OF NATRIURETIC PEPTIDE: CPT | Performed by: STUDENT IN AN ORGANIZED HEALTH CARE EDUCATION/TRAINING PROGRAM

## 2021-12-15 PROCEDURE — 85379 FIBRIN DEGRADATION QUANT: CPT | Performed by: STUDENT IN AN ORGANIZED HEALTH CARE EDUCATION/TRAINING PROGRAM

## 2021-12-15 PROCEDURE — 25000242 PHARM REV CODE 250 ALT 637 W/ HCPCS: Performed by: SURGERY

## 2021-12-15 PROCEDURE — 85025 COMPLETE CBC W/AUTO DIFF WBC: CPT | Performed by: STUDENT IN AN ORGANIZED HEALTH CARE EDUCATION/TRAINING PROGRAM

## 2021-12-15 PROCEDURE — 94640 AIRWAY INHALATION TREATMENT: CPT

## 2021-12-15 PROCEDURE — 84100 ASSAY OF PHOSPHORUS: CPT | Performed by: SURGERY

## 2021-12-15 PROCEDURE — 93010 EKG 12-LEAD: ICD-10-PCS | Mod: ,,, | Performed by: INTERNAL MEDICINE

## 2021-12-15 PROCEDURE — 93005 ELECTROCARDIOGRAM TRACING: CPT

## 2021-12-15 PROCEDURE — 36415 COLL VENOUS BLD VENIPUNCTURE: CPT | Performed by: SURGERY

## 2021-12-15 PROCEDURE — 80053 COMPREHEN METABOLIC PANEL: CPT | Performed by: STUDENT IN AN ORGANIZED HEALTH CARE EDUCATION/TRAINING PROGRAM

## 2021-12-15 PROCEDURE — 83735 ASSAY OF MAGNESIUM: CPT | Performed by: SURGERY

## 2021-12-15 PROCEDURE — 93010 ELECTROCARDIOGRAM REPORT: CPT | Mod: ,,, | Performed by: INTERNAL MEDICINE

## 2021-12-15 PROCEDURE — 36415 COLL VENOUS BLD VENIPUNCTURE: CPT | Performed by: STUDENT IN AN ORGANIZED HEALTH CARE EDUCATION/TRAINING PROGRAM

## 2021-12-15 PROCEDURE — 99284 EMERGENCY DEPT VISIT MOD MDM: CPT | Mod: 25

## 2021-12-15 PROCEDURE — 84443 ASSAY THYROID STIM HORMONE: CPT | Performed by: SURGERY

## 2021-12-15 RX ORDER — IPRATROPIUM BROMIDE AND ALBUTEROL SULFATE 2.5; .5 MG/3ML; MG/3ML
1 SOLUTION RESPIRATORY (INHALATION) ONCE
Status: COMPLETED | OUTPATIENT
Start: 2021-12-15 | End: 2021-12-15

## 2021-12-15 RX ORDER — IPRATROPIUM BROMIDE AND ALBUTEROL SULFATE 2.5; .5 MG/3ML; MG/3ML
3 SOLUTION RESPIRATORY (INHALATION)
Status: DISCONTINUED | OUTPATIENT
Start: 2021-12-15 | End: 2021-12-15

## 2021-12-15 RX ADMIN — IPRATROPIUM BROMIDE AND ALBUTEROL SULFATE 1 ML: .5; 3 SOLUTION RESPIRATORY (INHALATION) at 06:12

## 2021-12-17 ENCOUNTER — PATIENT MESSAGE (OUTPATIENT)
Dept: INTERNAL MEDICINE | Facility: CLINIC | Age: 26
End: 2021-12-17
Payer: COMMERCIAL

## 2021-12-17 DIAGNOSIS — R00.2 PALPITATIONS: Primary | ICD-10-CM

## 2021-12-21 ENCOUNTER — PES CALL (OUTPATIENT)
Dept: ADMINISTRATIVE | Facility: CLINIC | Age: 26
End: 2021-12-21
Payer: COMMERCIAL

## 2021-12-22 ENCOUNTER — ROUTINE PRENATAL (OUTPATIENT)
Dept: OBSTETRICS AND GYNECOLOGY | Facility: CLINIC | Age: 26
End: 2021-12-22
Payer: COMMERCIAL

## 2021-12-22 VITALS
HEART RATE: 92 BPM | WEIGHT: 186.88 LBS | BODY MASS INDEX: 32.08 KG/M2 | DIASTOLIC BLOOD PRESSURE: 76 MMHG | SYSTOLIC BLOOD PRESSURE: 124 MMHG

## 2021-12-22 DIAGNOSIS — Z3A.35 35 WEEKS GESTATION OF PREGNANCY: Primary | ICD-10-CM

## 2021-12-22 DIAGNOSIS — O99.513 ASTHMA AFFECTING PREGNANCY IN THIRD TRIMESTER: ICD-10-CM

## 2021-12-22 DIAGNOSIS — J45.909 ASTHMA AFFECTING PREGNANCY IN THIRD TRIMESTER: ICD-10-CM

## 2021-12-22 PROCEDURE — 99999 PR PBB SHADOW E&M-EST. PATIENT-LVL II: ICD-10-PCS | Mod: PBBFAC,,, | Performed by: STUDENT IN AN ORGANIZED HEALTH CARE EDUCATION/TRAINING PROGRAM

## 2021-12-22 PROCEDURE — 0502F PR SUBSEQUENT PRENATAL CARE: ICD-10-PCS | Mod: CPTII,S$GLB,, | Performed by: STUDENT IN AN ORGANIZED HEALTH CARE EDUCATION/TRAINING PROGRAM

## 2021-12-22 PROCEDURE — 0502F SUBSEQUENT PRENATAL CARE: CPT | Mod: CPTII,S$GLB,, | Performed by: STUDENT IN AN ORGANIZED HEALTH CARE EDUCATION/TRAINING PROGRAM

## 2021-12-22 PROCEDURE — 99999 PR PBB SHADOW E&M-EST. PATIENT-LVL II: CPT | Mod: PBBFAC,,, | Performed by: STUDENT IN AN ORGANIZED HEALTH CARE EDUCATION/TRAINING PROGRAM

## 2021-12-22 RX ORDER — ALBUTEROL SULFATE 90 UG/1
1 AEROSOL, METERED RESPIRATORY (INHALATION) EVERY 4 HOURS PRN
Qty: 6.7 G | Refills: 5 | OUTPATIENT
Start: 2021-12-22 | End: 2021-12-29

## 2021-12-26 ENCOUNTER — PATIENT MESSAGE (OUTPATIENT)
Dept: INTERNAL MEDICINE | Facility: CLINIC | Age: 26
End: 2021-12-26
Payer: COMMERCIAL

## 2021-12-27 ENCOUNTER — ROUTINE PRENATAL (OUTPATIENT)
Dept: OBSTETRICS AND GYNECOLOGY | Facility: CLINIC | Age: 26
End: 2021-12-27
Payer: COMMERCIAL

## 2021-12-27 VITALS
HEART RATE: 90 BPM | WEIGHT: 187 LBS | SYSTOLIC BLOOD PRESSURE: 118 MMHG | DIASTOLIC BLOOD PRESSURE: 72 MMHG | BODY MASS INDEX: 32.1 KG/M2

## 2021-12-27 DIAGNOSIS — Z34.93 NORMAL PREGNANCY IN THIRD TRIMESTER: Primary | ICD-10-CM

## 2021-12-27 DIAGNOSIS — R05.9 COUGH: ICD-10-CM

## 2021-12-27 PROCEDURE — 87081 CULTURE SCREEN ONLY: CPT | Performed by: STUDENT IN AN ORGANIZED HEALTH CARE EDUCATION/TRAINING PROGRAM

## 2021-12-27 PROCEDURE — 0502F SUBSEQUENT PRENATAL CARE: CPT | Mod: CPTII,S$GLB,, | Performed by: STUDENT IN AN ORGANIZED HEALTH CARE EDUCATION/TRAINING PROGRAM

## 2021-12-27 PROCEDURE — 99999 PR PBB SHADOW E&M-EST. PATIENT-LVL II: ICD-10-PCS | Mod: PBBFAC,,, | Performed by: STUDENT IN AN ORGANIZED HEALTH CARE EDUCATION/TRAINING PROGRAM

## 2021-12-27 PROCEDURE — 99999 PR PBB SHADOW E&M-EST. PATIENT-LVL II: CPT | Mod: PBBFAC,,, | Performed by: STUDENT IN AN ORGANIZED HEALTH CARE EDUCATION/TRAINING PROGRAM

## 2021-12-27 PROCEDURE — 0502F PR SUBSEQUENT PRENATAL CARE: ICD-10-PCS | Mod: CPTII,S$GLB,, | Performed by: STUDENT IN AN ORGANIZED HEALTH CARE EDUCATION/TRAINING PROGRAM

## 2021-12-29 ENCOUNTER — PATIENT MESSAGE (OUTPATIENT)
Dept: ADMINISTRATIVE | Facility: OTHER | Age: 26
End: 2021-12-29
Payer: COMMERCIAL

## 2021-12-29 ENCOUNTER — NURSE TRIAGE (OUTPATIENT)
Dept: ADMINISTRATIVE | Facility: CLINIC | Age: 26
End: 2021-12-29
Payer: COMMERCIAL

## 2021-12-29 ENCOUNTER — PATIENT MESSAGE (OUTPATIENT)
Dept: OBSTETRICS AND GYNECOLOGY | Facility: CLINIC | Age: 26
End: 2021-12-29
Payer: COMMERCIAL

## 2021-12-29 ENCOUNTER — HOSPITAL ENCOUNTER (EMERGENCY)
Facility: HOSPITAL | Age: 26
Discharge: HOME | End: 2021-12-29
Attending: SURGERY
Payer: COMMERCIAL

## 2021-12-29 VITALS
HEART RATE: 92 BPM | WEIGHT: 192.81 LBS | DIASTOLIC BLOOD PRESSURE: 81 MMHG | SYSTOLIC BLOOD PRESSURE: 144 MMHG | BODY MASS INDEX: 33.09 KG/M2 | RESPIRATION RATE: 20 BRPM | TEMPERATURE: 97 F | OXYGEN SATURATION: 99 %

## 2021-12-29 DIAGNOSIS — O16.3 ELEVATED BLOOD PRESSURE COMPLICATING PREGNANCY IN THIRD TRIMESTER, ANTEPARTUM: ICD-10-CM

## 2021-12-29 DIAGNOSIS — R05.9 COUGH: Primary | ICD-10-CM

## 2021-12-29 LAB
ALBUMIN SERPL BCP-MCNC: 2.4 G/DL (ref 3.5–5.2)
ALP SERPL-CCNC: 155 U/L (ref 55–135)
ALT SERPL W/O P-5'-P-CCNC: 10 U/L (ref 10–44)
ANION GAP SERPL CALC-SCNC: 7 MMOL/L (ref 8–16)
ANISOCYTOSIS BLD QL SMEAR: SLIGHT
AST SERPL-CCNC: 10 U/L (ref 10–40)
BASOPHILS NFR BLD: 0 % (ref 0–1.9)
BILIRUB SERPL-MCNC: 0.2 MG/DL (ref 0.1–1)
BILIRUB UR QL STRIP: NEGATIVE
BNP SERPL-MCNC: 96 PG/ML (ref 0–99)
BUN SERPL-MCNC: 5 MG/DL (ref 6–20)
CALCIUM SERPL-MCNC: 9.1 MG/DL (ref 8.7–10.5)
CHLORIDE SERPL-SCNC: 108 MMOL/L (ref 95–110)
CK MB SERPL-MCNC: 0.6 NG/ML (ref 0.1–6.5)
CK MB SERPL-RTO: 2.6 % (ref 0–5)
CK SERPL-CCNC: 23 U/L (ref 20–180)
CK SERPL-CCNC: 23 U/L (ref 20–180)
CLARITY UR: CLEAR
CO2 SERPL-SCNC: 23 MMOL/L (ref 23–29)
COLOR UR: YELLOW
CREAT SERPL-MCNC: 0.7 MG/DL (ref 0.5–1.4)
DACRYOCYTES BLD QL SMEAR: ABNORMAL
DIFFERENTIAL METHOD: ABNORMAL
EOSINOPHIL NFR BLD: 1 % (ref 0–8)
ERYTHROCYTE [DISTWIDTH] IN BLOOD BY AUTOMATED COUNT: 17.3 % (ref 11.5–14.5)
EST. GFR  (AFRICAN AMERICAN): >60 ML/MIN/1.73 M^2
EST. GFR  (NON AFRICAN AMERICAN): >60 ML/MIN/1.73 M^2
GLUCOSE SERPL-MCNC: 83 MG/DL (ref 70–110)
GLUCOSE UR QL STRIP: NEGATIVE
HCT VFR BLD AUTO: 32.8 % (ref 37–48.5)
HGB BLD-MCNC: 10.3 G/DL (ref 12–16)
HGB UR QL STRIP: NEGATIVE
HYPOCHROMIA BLD QL SMEAR: ABNORMAL
IMM GRANULOCYTES # BLD AUTO: ABNORMAL K/UL (ref 0–0.04)
IMM GRANULOCYTES NFR BLD AUTO: ABNORMAL % (ref 0–0.5)
KETONES UR QL STRIP: NEGATIVE
LEUKOCYTE ESTERASE UR QL STRIP: NEGATIVE
LYMPHOCYTES NFR BLD: 18 % (ref 18–48)
MCH RBC QN AUTO: 23.3 PG (ref 27–31)
MCHC RBC AUTO-ENTMCNC: 31.4 G/DL (ref 32–36)
MCV RBC AUTO: 74 FL (ref 82–98)
MONOCYTES NFR BLD: 4 % (ref 4–15)
NEUTROPHILS NFR BLD: 76 % (ref 38–73)
NEUTS BAND NFR BLD MANUAL: 1 %
NITRITE UR QL STRIP: NEGATIVE
NRBC BLD-RTO: 0 /100 WBC
PH UR STRIP: 7 [PH] (ref 5–8)
PLATELET # BLD AUTO: 190 K/UL (ref 150–450)
PLATELET BLD QL SMEAR: ABNORMAL
PMV BLD AUTO: 12.2 FL (ref 9.2–12.9)
POLYCHROMASIA BLD QL SMEAR: ABNORMAL
POTASSIUM SERPL-SCNC: 3.3 MMOL/L (ref 3.5–5.1)
PROT SERPL-MCNC: 7.2 G/DL (ref 6–8.4)
PROT UR QL STRIP: NEGATIVE
RBC # BLD AUTO: 4.42 M/UL (ref 4–5.4)
SARS-COV-2 RDRP RESP QL NAA+PROBE: NEGATIVE
SODIUM SERPL-SCNC: 138 MMOL/L (ref 136–145)
SP GR UR STRIP: 1.01 (ref 1–1.03)
TROPONIN I SERPL DL<=0.01 NG/ML-MCNC: <0.006 NG/ML (ref 0–0.03)
URN SPEC COLLECT METH UR: NORMAL
UROBILINOGEN UR STRIP-ACNC: NEGATIVE EU/DL
WBC # BLD AUTO: 10.98 K/UL (ref 3.9–12.7)

## 2021-12-29 PROCEDURE — 83880 ASSAY OF NATRIURETIC PEPTIDE: CPT | Performed by: SURGERY

## 2021-12-29 PROCEDURE — 81003 URINALYSIS AUTO W/O SCOPE: CPT | Performed by: SURGERY

## 2021-12-29 PROCEDURE — 36415 COLL VENOUS BLD VENIPUNCTURE: CPT | Performed by: SURGERY

## 2021-12-29 PROCEDURE — 82550 ASSAY OF CK (CPK): CPT | Performed by: SURGERY

## 2021-12-29 PROCEDURE — 93010 EKG 12-LEAD: ICD-10-PCS | Mod: ,,, | Performed by: INTERNAL MEDICINE

## 2021-12-29 PROCEDURE — U0002 COVID-19 LAB TEST NON-CDC: HCPCS | Performed by: SURGERY

## 2021-12-29 PROCEDURE — 99284 EMERGENCY DEPT VISIT MOD MDM: CPT | Mod: 25

## 2021-12-29 PROCEDURE — 93005 ELECTROCARDIOGRAM TRACING: CPT

## 2021-12-29 PROCEDURE — 94640 AIRWAY INHALATION TREATMENT: CPT

## 2021-12-29 PROCEDURE — 85007 BL SMEAR W/DIFF WBC COUNT: CPT | Performed by: SURGERY

## 2021-12-29 PROCEDURE — 93010 ELECTROCARDIOGRAM REPORT: CPT | Mod: ,,, | Performed by: INTERNAL MEDICINE

## 2021-12-29 PROCEDURE — 80053 COMPREHEN METABOLIC PANEL: CPT | Performed by: SURGERY

## 2021-12-29 PROCEDURE — 84484 ASSAY OF TROPONIN QUANT: CPT | Performed by: SURGERY

## 2021-12-29 PROCEDURE — 85027 COMPLETE CBC AUTOMATED: CPT | Performed by: SURGERY

## 2021-12-29 PROCEDURE — 25000242 PHARM REV CODE 250 ALT 637 W/ HCPCS: Performed by: SURGERY

## 2021-12-29 PROCEDURE — 99900031 HC PATIENT EDUCATION (STAT)

## 2021-12-29 RX ORDER — ALBUTEROL SULFATE 0.83 MG/ML
2.5 SOLUTION RESPIRATORY (INHALATION) EVERY 6 HOURS PRN
Qty: 30 EACH | Refills: 0 | Status: SHIPPED | OUTPATIENT
Start: 2021-12-29 | End: 2023-12-29 | Stop reason: SDUPTHER

## 2021-12-29 RX ORDER — ALBUTEROL SULFATE 2.5 MG/.5ML
10 SOLUTION RESPIRATORY (INHALATION)
Status: COMPLETED | OUTPATIENT
Start: 2021-12-29 | End: 2021-12-29

## 2021-12-29 RX ORDER — BENZONATATE 100 MG/1
200 CAPSULE ORAL 3 TIMES DAILY PRN
Qty: 20 CAPSULE | Refills: 0 | Status: SHIPPED | OUTPATIENT
Start: 2021-12-29 | End: 2022-01-08

## 2021-12-29 RX ADMIN — ALBUTEROL SULFATE 10 MG: 2.5 SOLUTION RESPIRATORY (INHALATION) at 08:12

## 2021-12-30 ENCOUNTER — CLINICAL SUPPORT (OUTPATIENT)
Dept: OBSTETRICS AND GYNECOLOGY | Facility: CLINIC | Age: 26
End: 2021-12-30
Payer: COMMERCIAL

## 2021-12-30 ENCOUNTER — LAB VISIT (OUTPATIENT)
Dept: LAB | Facility: HOSPITAL | Age: 26
End: 2021-12-30
Attending: STUDENT IN AN ORGANIZED HEALTH CARE EDUCATION/TRAINING PROGRAM
Payer: COMMERCIAL

## 2021-12-30 VITALS — SYSTOLIC BLOOD PRESSURE: 142 MMHG | DIASTOLIC BLOOD PRESSURE: 92 MMHG | HEART RATE: 84 BPM

## 2021-12-30 DIAGNOSIS — O16.3 ELEVATED BLOOD PRESSURE AFFECTING PREGNANCY IN THIRD TRIMESTER, ANTEPARTUM: Primary | ICD-10-CM

## 2021-12-30 DIAGNOSIS — O16.3 ELEVATED BLOOD PRESSURE AFFECTING PREGNANCY IN THIRD TRIMESTER, ANTEPARTUM: ICD-10-CM

## 2021-12-30 LAB
ALBUMIN SERPL BCP-MCNC: 2.5 G/DL (ref 3.5–5.2)
ALP SERPL-CCNC: 160 U/L (ref 55–135)
ALT SERPL W/O P-5'-P-CCNC: 11 U/L (ref 10–44)
ANION GAP SERPL CALC-SCNC: 12 MMOL/L (ref 8–16)
AST SERPL-CCNC: 12 U/L (ref 10–40)
BACTERIA SPEC AEROBE CULT: NORMAL
BASOPHILS # BLD AUTO: 0.03 K/UL (ref 0–0.2)
BASOPHILS NFR BLD: 0.3 % (ref 0–1.9)
BILIRUB SERPL-MCNC: 0.3 MG/DL (ref 0.1–1)
BUN SERPL-MCNC: 4 MG/DL (ref 6–20)
CALCIUM SERPL-MCNC: 9.1 MG/DL (ref 8.7–10.5)
CHLORIDE SERPL-SCNC: 106 MMOL/L (ref 95–110)
CO2 SERPL-SCNC: 23 MMOL/L (ref 23–29)
CREAT SERPL-MCNC: 0.7 MG/DL (ref 0.5–1.4)
DIFFERENTIAL METHOD: ABNORMAL
EOSINOPHIL # BLD AUTO: 0.1 K/UL (ref 0–0.5)
EOSINOPHIL NFR BLD: 0.4 % (ref 0–8)
ERYTHROCYTE [DISTWIDTH] IN BLOOD BY AUTOMATED COUNT: 17.6 % (ref 11.5–14.5)
EST. GFR  (AFRICAN AMERICAN): >60 ML/MIN/1.73 M^2
EST. GFR  (NON AFRICAN AMERICAN): >60 ML/MIN/1.73 M^2
GLUCOSE SERPL-MCNC: 116 MG/DL (ref 70–110)
HCT VFR BLD AUTO: 32.6 % (ref 37–48.5)
HGB BLD-MCNC: 10.2 G/DL (ref 12–16)
IMM GRANULOCYTES # BLD AUTO: 0.07 K/UL (ref 0–0.04)
IMM GRANULOCYTES NFR BLD AUTO: 0.6 % (ref 0–0.5)
LYMPHOCYTES # BLD AUTO: 2 K/UL (ref 1–4.8)
LYMPHOCYTES NFR BLD: 17.8 % (ref 18–48)
MCH RBC QN AUTO: 23.2 PG (ref 27–31)
MCHC RBC AUTO-ENTMCNC: 31.3 G/DL (ref 32–36)
MCV RBC AUTO: 74 FL (ref 82–98)
MONOCYTES # BLD AUTO: 0.4 K/UL (ref 0.3–1)
MONOCYTES NFR BLD: 3.5 % (ref 4–15)
NEUTROPHILS # BLD AUTO: 8.8 K/UL (ref 1.8–7.7)
NEUTROPHILS NFR BLD: 77.4 % (ref 38–73)
NRBC BLD-RTO: 0 /100 WBC
PLATELET # BLD AUTO: 205 K/UL (ref 150–450)
PMV BLD AUTO: 11.3 FL (ref 9.2–12.9)
POTASSIUM SERPL-SCNC: 3.4 MMOL/L (ref 3.5–5.1)
PROT SERPL-MCNC: 7.4 G/DL (ref 6–8.4)
RBC # BLD AUTO: 4.39 M/UL (ref 4–5.4)
SODIUM SERPL-SCNC: 141 MMOL/L (ref 136–145)
WBC # BLD AUTO: 11.32 K/UL (ref 3.9–12.7)

## 2021-12-30 PROCEDURE — 85025 COMPLETE CBC W/AUTO DIFF WBC: CPT | Performed by: STUDENT IN AN ORGANIZED HEALTH CARE EDUCATION/TRAINING PROGRAM

## 2021-12-30 PROCEDURE — 36415 COLL VENOUS BLD VENIPUNCTURE: CPT | Performed by: STUDENT IN AN ORGANIZED HEALTH CARE EDUCATION/TRAINING PROGRAM

## 2021-12-30 PROCEDURE — 80053 COMPREHEN METABOLIC PANEL: CPT | Performed by: STUDENT IN AN ORGANIZED HEALTH CARE EDUCATION/TRAINING PROGRAM

## 2021-12-30 NOTE — ED PROVIDER NOTES
[facility]                                                 I reviewed the ER triage nurse's note before evaluating the patient    Chief Complaint  26 y.o. female with Cough   -- Pt reports she is 36 weeks pregnant and has shortness of breath and cough.   -- She was seen in ER on 12/15/21 for same issues     History of Present Illness  Carline Iqbal presents to the emergency room with cough for 2 weeks  Patient has a history of asthmatic bronchitis, no active wheezing on ED exam  Patient has no signs of distress, was seen in the ER 2 weeks ago for cough  Patient was subsequently seen by OBGYN, patient is 36 weeks pregnant  Denies any pregnancy related complaint today with no spotting or contractions  Patient states that the meter dose inhaler prescribed helps minimally this week    The history is provided by the patient  Previous medical records were obtained from [facility]  Previous records are summarized from prior ER visits and hospitalizations  Medical history significant for asthma, eczema, pilonidal cyst  Surgical history significant for pilonidal cyst and PE tubes  No Known Allergies   No significant family history  No significant social history, nonsmoker    I have reviewed all of this patient's past medical, surgical, family, and social   histories as well as active allergies and medications documented in the  electronic medical record    Review of Systems and Physical Exam      Review of Systems (all other ROS are otherwise negative)  -- Constitution - no fever, denies fatigue, no weakness, no chills, night sweats  -- Eyes - no tearing or redness, no visual disturbance  -- Ear, Nose - no tinnitus or earache, no nasal congestion or discharge  -- Mouth,Throat - no sore throat, no toothache, normal voice, normal swallowing  -- Respiratory - cough and congestion, no shortness of breath, no MCCLENDON  -- Cardiovascular - denies chest pain, no palpitations, denies claudication  -- Gastrointestinal  - denies abdominal pain, nausea, vomiting, or diarrhea  -- Genitourinary - no dysuria, no hematuria, no flank pain, no bladder pain  -- Musculoskeletal - denies back pain, negative for trauma or injury  -- Neurological - no headache, no numbness, tingling, seizure, balance issues  -- Hematologic- no bruising, no bleeding, nose bleed, bleeding disorders    Vital Signs (reviewed by the physician)  Her oral temperature is 97.1 °F (36.2 °C).   Her blood pressure is 153/88 and her pulse is 91.   Her respiration is 20 and oxygen saturation is 100%.     Physical Exam  -- Nursing note and vitals reviewed  -- Constitutional: Appears well-developed and well-nourished  -- Head: Atraumatic. Normocephalic. No obvious abnormality  -- Eyes: Pupils are equal and reactive to light. Normal conjunctiva and lids  -- Nose: Nose normal in appearance, nares grossly normal. No discharge  -- Throat: Mucous membranes moist, pharynx normal, normal tonsils. No lesions   -- Ears: External ears and TM normal bilaterally. Normal hearing and no drainage  -- Neck: Normal range of motion. Neck supple. No masses, trachea midline  -- Cardiac: Normal rate, regular rhythm and normal heart sounds  -- Respiratory: faint rhonchi at the bilateral bases with no active wheezing   -- Gastrointestinal: Soft, no tenderness. Normal bowel sounds. Normal liver edge  -- Musculoskeletal: Normal range of motion, no effusions. Joints stable   -- Neurological: No focal deficits. Showed good interaction with staff  -- Vascular: Posterior tibial, dorsalis pedis and radial pulses 2+ bilaterally      Emergency Room Course      Lab Results (reviewed by the physician)     K 3.3 (L)      CO2 23   BUN 5 (L)   CREATININE 0.7   GLU 83   ALKPHOS 155 (H)   AST 10   ALT 10   BILITOT 0.2   ALBUMIN 2.4 (L)   PROT 7.2   WBC 10.98   HGB 10.3 (L)   HCT 32.8 (L)      CPK 23   CPK 23   CPKMB 0.6   TROPONINI <0.006   BNP 96     Urinalysis (reviewed by the physician)  --  Urinalysis performed during this ER visit showed no signs of infection      EKG (interpreted by the physician)  Overall Interpretation: normal rate and rhythm with no obvious ischemic changes  Normal sinus rhythm @ 90 bpm  No ST elevation or depression  No arrhythmia or QRS change noted  No previous EKG available for comparison    Additional Work up (reviewed by the physician)  -- rapid Coronavirus PCR was negative    Medications Given  albuterol sulfate nebulizer solution 10 mg (10 mg Nebulization Given 12/29/21 2050)     Medical Decision Making     ED Course/ED Management  -- patient with cough cold symptoms with a history of asthma reported today  -- negative COVID swab, stable lab work in the emergency room today  -- patient would like a prescription for breathing treatments, has an MDI  -- no wheezing, no signs of distress, antibiotics and indicated today  -- patient does have a mildly elevated blood pressure on ER assessment today  -- 3rd trimester pregnancy; urine had no proteinuria on evaluation today  -- patient was discussed at length with her OBGYN Dr. Bell this evening  -- would like the patient get a prescription for breathing treatment on discharge  -- close follow-up in clinic with a blood pressure check within the next 48 hours  -- patient will call the clinic tomorrow for that appointment, clear lung exam on DC  -- carefully counseled to return to the ER with any concerning symptoms after DC    Assessment, Disposition, & Plan      Diagnosis  [R05.9] Cough (Primary)  [O16.3] Elevated blood pressure complicating pregnancy in third trimester    Disposition and Plan  -- Disposition: home  -- Condition: stable  -- Follow-up: Patient to follow up with OBGYN in 1-2 days.  -- I advised the patient that we have found no life threatening condition today  -- At this time, I believe the patient is clinically stable for discharge.   -- Pt understands that the visit today is to address immediate concerns   --  Further workup and evaluation as an outpatient may be required  -- The patient acknowledges that close follow up with a MD is required   -- Patient agrees to comply with all instruction and direction given in the ER    This note is dictated on M*Modal word recognition program.  There are word recognition mistakes that are occasionally missed on review.         Navneet Fierro MD  12/29/21 9025

## 2021-12-31 ENCOUNTER — LAB VISIT (OUTPATIENT)
Dept: LAB | Facility: HOSPITAL | Age: 26
End: 2021-12-31
Attending: STUDENT IN AN ORGANIZED HEALTH CARE EDUCATION/TRAINING PROGRAM
Payer: COMMERCIAL

## 2021-12-31 DIAGNOSIS — O16.3 ELEVATED BLOOD PRESSURE AFFECTING PREGNANCY IN THIRD TRIMESTER, ANTEPARTUM: ICD-10-CM

## 2021-12-31 LAB
CREAT 24H UR-MRATE: 38.7 MG/HR (ref 40–75)
CREAT UR-MCNC: 34.4 MG/DL (ref 15–325)
CREATININE, URINE (MG/SPEC): 928.8 MG/SPEC
PROT 24H UR-MRATE: NORMAL MG/SPEC (ref 0–100)
PROT UR-MCNC: <7 MG/DL (ref 0–15)
URINE COLLECTION DURATION: 24 HR
URINE COLLECTION DURATION: 24 HR
URINE VOLUME: 2700 ML
URINE VOLUME: 2700 ML

## 2021-12-31 PROCEDURE — 84156 ASSAY OF PROTEIN URINE: CPT | Performed by: STUDENT IN AN ORGANIZED HEALTH CARE EDUCATION/TRAINING PROGRAM

## 2021-12-31 PROCEDURE — 82570 ASSAY OF URINE CREATININE: CPT | Performed by: STUDENT IN AN ORGANIZED HEALTH CARE EDUCATION/TRAINING PROGRAM

## 2022-01-03 ENCOUNTER — ROUTINE PRENATAL (OUTPATIENT)
Dept: OBSTETRICS AND GYNECOLOGY | Facility: CLINIC | Age: 27
End: 2022-01-03
Payer: COMMERCIAL

## 2022-01-03 VITALS
SYSTOLIC BLOOD PRESSURE: 122 MMHG | HEART RATE: 94 BPM | WEIGHT: 188.31 LBS | DIASTOLIC BLOOD PRESSURE: 84 MMHG | BODY MASS INDEX: 32.32 KG/M2

## 2022-01-03 DIAGNOSIS — O13.3 GESTATIONAL HYPERTENSION, THIRD TRIMESTER: Primary | ICD-10-CM

## 2022-01-03 PROCEDURE — 99213 OFFICE O/P EST LOW 20 MIN: CPT | Mod: S$GLB,,, | Performed by: STUDENT IN AN ORGANIZED HEALTH CARE EDUCATION/TRAINING PROGRAM

## 2022-01-03 PROCEDURE — 99213 PR OFFICE/OUTPT VISIT, EST, LEVL III, 20-29 MIN: ICD-10-PCS | Mod: S$GLB,,, | Performed by: STUDENT IN AN ORGANIZED HEALTH CARE EDUCATION/TRAINING PROGRAM

## 2022-01-03 PROCEDURE — 99999 PR PBB SHADOW E&M-EST. PATIENT-LVL II: CPT | Mod: PBBFAC,,, | Performed by: STUDENT IN AN ORGANIZED HEALTH CARE EDUCATION/TRAINING PROGRAM

## 2022-01-03 PROCEDURE — 99999 PR PBB SHADOW E&M-EST. PATIENT-LVL II: ICD-10-PCS | Mod: PBBFAC,,, | Performed by: STUDENT IN AN ORGANIZED HEALTH CARE EDUCATION/TRAINING PROGRAM

## 2022-01-03 NOTE — PROGRESS NOTES
Patient with no complaints. Denies vaginal bleeding or contractions. Good FM. GBS negative. Labor precautions discused with patient. RTC in 1 week.     Vitals signs, FHTs, urine dip, and PE findings documented, reviewed and available in OB flow chart.    I spent a total of 20 minutes on the day of the visit. This includes face to face time and non-face to face time preparing to see the patient (eg, review of tests), Obtaining and/or reviewing separately obtained history, Documenting clinical information in the electronic or other health record, Independently interpreting resultsand communicating results to the patient/family/caregiver, or Care coordination.

## 2022-01-05 ENCOUNTER — CLINICAL SUPPORT (OUTPATIENT)
Dept: OBSTETRICS AND GYNECOLOGY | Facility: CLINIC | Age: 27
End: 2022-01-05
Payer: COMMERCIAL

## 2022-01-05 ENCOUNTER — HOSPITAL ENCOUNTER (INPATIENT)
Facility: HOSPITAL | Age: 27
LOS: 3 days | Discharge: HOME | End: 2022-01-08
Attending: STUDENT IN AN ORGANIZED HEALTH CARE EDUCATION/TRAINING PROGRAM | Admitting: STUDENT IN AN ORGANIZED HEALTH CARE EDUCATION/TRAINING PROGRAM
Payer: COMMERCIAL

## 2022-01-05 VITALS — DIASTOLIC BLOOD PRESSURE: 94 MMHG | SYSTOLIC BLOOD PRESSURE: 140 MMHG | HEART RATE: 113 BPM

## 2022-01-05 DIAGNOSIS — O99.513 ASTHMA AFFECTING PREGNANCY IN THIRD TRIMESTER: ICD-10-CM

## 2022-01-05 DIAGNOSIS — O13.3 GESTATIONAL HYPERTENSION, THIRD TRIMESTER: ICD-10-CM

## 2022-01-05 DIAGNOSIS — O13.3 GESTATIONAL HYPERTENSION, THIRD TRIMESTER: Primary | ICD-10-CM

## 2022-01-05 DIAGNOSIS — J45.909 ASTHMA AFFECTING PREGNANCY IN THIRD TRIMESTER: ICD-10-CM

## 2022-01-05 LAB
ABO GROUP BLD: NORMAL
ALBUMIN SERPL BCP-MCNC: 2.7 G/DL (ref 3.5–5.2)
ALP SERPL-CCNC: 186 U/L (ref 55–135)
ALT SERPL W/O P-5'-P-CCNC: 15 U/L (ref 10–44)
ANION GAP SERPL CALC-SCNC: 11 MMOL/L (ref 8–16)
AST SERPL-CCNC: 15 U/L (ref 10–40)
BASOPHILS # BLD AUTO: 0.04 K/UL (ref 0–0.2)
BASOPHILS NFR BLD: 0.4 % (ref 0–1.9)
BILIRUB SERPL-MCNC: 0.2 MG/DL (ref 0.1–1)
BLD GP AB SCN CELLS X3 SERPL QL: NORMAL
BUN SERPL-MCNC: 6 MG/DL (ref 6–20)
CALCIUM SERPL-MCNC: 9.6 MG/DL (ref 8.7–10.5)
CHLORIDE SERPL-SCNC: 105 MMOL/L (ref 95–110)
CO2 SERPL-SCNC: 22 MMOL/L (ref 23–29)
CREAT SERPL-MCNC: 0.8 MG/DL (ref 0.5–1.4)
CREAT UR-MCNC: 65.4 MG/DL (ref 15–325)
DIFFERENTIAL METHOD: ABNORMAL
EOSINOPHIL # BLD AUTO: 0 K/UL (ref 0–0.5)
EOSINOPHIL NFR BLD: 0.4 % (ref 0–8)
ERYTHROCYTE [DISTWIDTH] IN BLOOD BY AUTOMATED COUNT: 17.7 % (ref 11.5–14.5)
EST. GFR  (AFRICAN AMERICAN): >60 ML/MIN/1.73 M^2
EST. GFR  (NON AFRICAN AMERICAN): >60 ML/MIN/1.73 M^2
GLUCOSE SERPL-MCNC: 97 MG/DL (ref 70–110)
HCT VFR BLD AUTO: 35.7 % (ref 37–48.5)
HGB BLD-MCNC: 11.2 G/DL (ref 12–16)
IMM GRANULOCYTES # BLD AUTO: 0.06 K/UL (ref 0–0.04)
IMM GRANULOCYTES NFR BLD AUTO: 0.5 % (ref 0–0.5)
LYMPHOCYTES # BLD AUTO: 2.4 K/UL (ref 1–4.8)
LYMPHOCYTES NFR BLD: 21.6 % (ref 18–48)
MCH RBC QN AUTO: 23.4 PG (ref 27–31)
MCHC RBC AUTO-ENTMCNC: 31.4 G/DL (ref 32–36)
MCV RBC AUTO: 75 FL (ref 82–98)
MONOCYTES # BLD AUTO: 0.4 K/UL (ref 0.3–1)
MONOCYTES NFR BLD: 3.3 % (ref 4–15)
NEUTROPHILS # BLD AUTO: 8.1 K/UL (ref 1.8–7.7)
NEUTROPHILS NFR BLD: 73.8 % (ref 38–73)
NRBC BLD-RTO: 0 /100 WBC
PLATELET # BLD AUTO: 231 K/UL (ref 150–450)
PMV BLD AUTO: 12.3 FL (ref 9.2–12.9)
POTASSIUM SERPL-SCNC: 3.7 MMOL/L (ref 3.5–5.1)
PROT SERPL-MCNC: 8.3 G/DL (ref 6–8.4)
PROT UR-MCNC: 12 MG/DL (ref 0–15)
PROT/CREAT UR: 0.18 MG/G{CREAT} (ref 0–0.2)
RBC # BLD AUTO: 4.79 M/UL (ref 4–5.4)
RH BLD: NORMAL
RPR SER QL: NORMAL
SARS-COV-2 RDRP RESP QL NAA+PROBE: NEGATIVE
SODIUM SERPL-SCNC: 138 MMOL/L (ref 136–145)
WBC # BLD AUTO: 10.95 K/UL (ref 3.9–12.7)

## 2022-01-05 PROCEDURE — 63600175 PHARM REV CODE 636 W HCPCS: Performed by: STUDENT IN AN ORGANIZED HEALTH CARE EDUCATION/TRAINING PROGRAM

## 2022-01-05 PROCEDURE — 86850 RBC ANTIBODY SCREEN: CPT | Performed by: STUDENT IN AN ORGANIZED HEALTH CARE EDUCATION/TRAINING PROGRAM

## 2022-01-05 PROCEDURE — 80053 COMPREHEN METABOLIC PANEL: CPT | Performed by: STUDENT IN AN ORGANIZED HEALTH CARE EDUCATION/TRAINING PROGRAM

## 2022-01-05 PROCEDURE — 72100002 HC LABOR CARE, 1ST 8 HOURS

## 2022-01-05 PROCEDURE — 85025 COMPLETE CBC W/AUTO DIFF WBC: CPT | Performed by: STUDENT IN AN ORGANIZED HEALTH CARE EDUCATION/TRAINING PROGRAM

## 2022-01-05 PROCEDURE — 36415 COLL VENOUS BLD VENIPUNCTURE: CPT | Performed by: STUDENT IN AN ORGANIZED HEALTH CARE EDUCATION/TRAINING PROGRAM

## 2022-01-05 PROCEDURE — 11000001 HC ACUTE MED/SURG PRIVATE ROOM

## 2022-01-05 PROCEDURE — U0002 COVID-19 LAB TEST NON-CDC: HCPCS | Performed by: STUDENT IN AN ORGANIZED HEALTH CARE EDUCATION/TRAINING PROGRAM

## 2022-01-05 PROCEDURE — 86901 BLOOD TYPING SEROLOGIC RH(D): CPT | Performed by: STUDENT IN AN ORGANIZED HEALTH CARE EDUCATION/TRAINING PROGRAM

## 2022-01-05 PROCEDURE — 86850 RBC ANTIBODY SCREEN: CPT | Mod: 91 | Performed by: STUDENT IN AN ORGANIZED HEALTH CARE EDUCATION/TRAINING PROGRAM

## 2022-01-05 PROCEDURE — 84156 ASSAY OF PROTEIN URINE: CPT | Performed by: STUDENT IN AN ORGANIZED HEALTH CARE EDUCATION/TRAINING PROGRAM

## 2022-01-05 PROCEDURE — 86592 SYPHILIS TEST NON-TREP QUAL: CPT | Performed by: STUDENT IN AN ORGANIZED HEALTH CARE EDUCATION/TRAINING PROGRAM

## 2022-01-05 PROCEDURE — 86900 BLOOD TYPING SEROLOGIC ABO: CPT | Performed by: STUDENT IN AN ORGANIZED HEALTH CARE EDUCATION/TRAINING PROGRAM

## 2022-01-05 PROCEDURE — 25000003 PHARM REV CODE 250

## 2022-01-05 RX ORDER — PROCHLORPERAZINE EDISYLATE 5 MG/ML
5 INJECTION INTRAMUSCULAR; INTRAVENOUS EVERY 6 HOURS PRN
Status: CANCELLED | OUTPATIENT
Start: 2022-01-05

## 2022-01-05 RX ORDER — PROCHLORPERAZINE EDISYLATE 5 MG/ML
5 INJECTION INTRAMUSCULAR; INTRAVENOUS EVERY 6 HOURS PRN
Status: DISCONTINUED | OUTPATIENT
Start: 2022-01-05 | End: 2022-01-06

## 2022-01-05 RX ORDER — DIPHENOXYLATE HYDROCHLORIDE AND ATROPINE SULFATE 2.5; .025 MG/1; MG/1
1 TABLET ORAL 4 TIMES DAILY PRN
Status: DISCONTINUED | OUTPATIENT
Start: 2022-01-05 | End: 2022-01-06

## 2022-01-05 RX ORDER — OXYTOCIN/RINGER'S LACTATE 30/500 ML
0-30 PLASTIC BAG, INJECTION (ML) INTRAVENOUS CONTINUOUS
Status: DISCONTINUED | OUTPATIENT
Start: 2022-01-05 | End: 2022-01-06

## 2022-01-05 RX ORDER — SODIUM CHLORIDE, SODIUM LACTATE, POTASSIUM CHLORIDE, CALCIUM CHLORIDE 600; 310; 30; 20 MG/100ML; MG/100ML; MG/100ML; MG/100ML
INJECTION, SOLUTION INTRAVENOUS CONTINUOUS
Status: DISCONTINUED | OUTPATIENT
Start: 2022-01-05 | End: 2022-01-06

## 2022-01-05 RX ORDER — MISOPROSTOL 100 UG/1
800 TABLET ORAL
Status: CANCELLED | OUTPATIENT
Start: 2022-01-05

## 2022-01-05 RX ORDER — BUTORPHANOL TARTRATE 1 MG/ML
2 INJECTION INTRAMUSCULAR; INTRAVENOUS EVERY 4 HOURS PRN
Status: CANCELLED | OUTPATIENT
Start: 2022-01-05

## 2022-01-05 RX ORDER — SIMETHICONE 80 MG
1 TABLET,CHEWABLE ORAL 4 TIMES DAILY PRN
Status: CANCELLED | OUTPATIENT
Start: 2022-01-05

## 2022-01-05 RX ORDER — BUTORPHANOL TARTRATE 2 MG/ML
2 INJECTION INTRAMUSCULAR; INTRAVENOUS EVERY 4 HOURS PRN
Status: DISCONTINUED | OUTPATIENT
Start: 2022-01-05 | End: 2022-01-06

## 2022-01-05 RX ORDER — MISOPROSTOL 100 MCG
25 TABLET ORAL EVERY 4 HOURS PRN
Status: CANCELLED | OUTPATIENT
Start: 2022-01-05

## 2022-01-05 RX ORDER — TERBUTALINE SULFATE 1 MG/ML
0.25 INJECTION SUBCUTANEOUS
Status: CANCELLED | OUTPATIENT
Start: 2022-01-05

## 2022-01-05 RX ORDER — ONDANSETRON 4 MG/1
8 TABLET, ORALLY DISINTEGRATING ORAL EVERY 8 HOURS PRN
Status: CANCELLED | OUTPATIENT
Start: 2022-01-05

## 2022-01-05 RX ORDER — DIPHENOXYLATE HYDROCHLORIDE AND ATROPINE SULFATE 2.5; .025 MG/1; MG/1
1 TABLET ORAL 4 TIMES DAILY PRN
Status: CANCELLED | OUTPATIENT
Start: 2022-01-05

## 2022-01-05 RX ORDER — SODIUM CHLORIDE, SODIUM LACTATE, POTASSIUM CHLORIDE, CALCIUM CHLORIDE 600; 310; 30; 20 MG/100ML; MG/100ML; MG/100ML; MG/100ML
INJECTION, SOLUTION INTRAVENOUS CONTINUOUS
Status: CANCELLED | OUTPATIENT
Start: 2022-01-05

## 2022-01-05 RX ORDER — TERBUTALINE SULFATE 1 MG/ML
0.25 INJECTION SUBCUTANEOUS
Status: DISCONTINUED | OUTPATIENT
Start: 2022-01-05 | End: 2022-01-06

## 2022-01-05 RX ORDER — SODIUM CHLORIDE 9 MG/ML
INJECTION, SOLUTION INTRAVENOUS
Status: DISCONTINUED | OUTPATIENT
Start: 2022-01-05 | End: 2022-01-06

## 2022-01-05 RX ORDER — TRANEXAMIC ACID 100 MG/ML
1000 INJECTION, SOLUTION INTRAVENOUS ONCE AS NEEDED
Status: DISCONTINUED | OUTPATIENT
Start: 2022-01-05 | End: 2022-01-06

## 2022-01-05 RX ORDER — CALCIUM CARBONATE 200(500)MG
500 TABLET,CHEWABLE ORAL 3 TIMES DAILY PRN
Status: DISCONTINUED | OUTPATIENT
Start: 2022-01-05 | End: 2022-01-06

## 2022-01-05 RX ORDER — ONDANSETRON 8 MG/1
8 TABLET, ORALLY DISINTEGRATING ORAL EVERY 8 HOURS PRN
Status: DISCONTINUED | OUTPATIENT
Start: 2022-01-05 | End: 2022-01-06

## 2022-01-05 RX ORDER — MUPIROCIN 20 MG/G
OINTMENT TOPICAL
Status: DISCONTINUED | OUTPATIENT
Start: 2022-01-05 | End: 2022-01-06

## 2022-01-05 RX ORDER — OXYTOCIN/RINGER'S LACTATE 30/500 ML
334 PLASTIC BAG, INJECTION (ML) INTRAVENOUS ONCE
Status: CANCELLED | OUTPATIENT
Start: 2022-01-05 | End: 2022-01-05

## 2022-01-05 RX ORDER — MISOPROSTOL 200 UG/1
800 TABLET ORAL
Status: DISCONTINUED | OUTPATIENT
Start: 2022-01-05 | End: 2022-01-06

## 2022-01-05 RX ORDER — OXYTOCIN/RINGER'S LACTATE 30/500 ML
334 PLASTIC BAG, INJECTION (ML) INTRAVENOUS ONCE
Status: DISCONTINUED | OUTPATIENT
Start: 2022-01-05 | End: 2022-01-06

## 2022-01-05 RX ORDER — CALCIUM CARBONATE 200(500)MG
500 TABLET,CHEWABLE ORAL 3 TIMES DAILY PRN
Status: CANCELLED | OUTPATIENT
Start: 2022-01-05

## 2022-01-05 RX ORDER — SIMETHICONE 80 MG
1 TABLET,CHEWABLE ORAL 4 TIMES DAILY PRN
Status: DISCONTINUED | OUTPATIENT
Start: 2022-01-05 | End: 2022-01-06

## 2022-01-05 RX ORDER — SODIUM CHLORIDE 9 MG/ML
INJECTION, SOLUTION INTRAVENOUS
Status: CANCELLED | OUTPATIENT
Start: 2022-01-05

## 2022-01-05 RX ORDER — MISOPROSTOL 100 MCG
TABLET ORAL
Status: DISCONTINUED
Start: 2022-01-05 | End: 2022-01-06 | Stop reason: WASHOUT

## 2022-01-05 RX ORDER — MISOPROSTOL 100 MCG
25 TABLET ORAL EVERY 4 HOURS PRN
Status: DISCONTINUED | OUTPATIENT
Start: 2022-01-05 | End: 2022-01-06

## 2022-01-05 RX ORDER — OXYTOCIN/RINGER'S LACTATE 30/500 ML
95 PLASTIC BAG, INJECTION (ML) INTRAVENOUS ONCE
Status: DISCONTINUED | OUTPATIENT
Start: 2022-01-05 | End: 2022-01-06

## 2022-01-05 RX ORDER — OXYTOCIN/RINGER'S LACTATE 30/500 ML
95 PLASTIC BAG, INJECTION (ML) INTRAVENOUS ONCE
Status: CANCELLED | OUTPATIENT
Start: 2022-01-05 | End: 2022-01-05

## 2022-01-05 RX ORDER — MISOPROSTOL 100 MCG
TABLET ORAL
Status: COMPLETED
Start: 2022-01-05 | End: 2022-01-05

## 2022-01-05 RX ORDER — MUPIROCIN 20 MG/G
OINTMENT TOPICAL
Status: CANCELLED | OUTPATIENT
Start: 2022-01-05

## 2022-01-05 RX ORDER — OXYTOCIN/RINGER'S LACTATE 30/500 ML
0-30 PLASTIC BAG, INJECTION (ML) INTRAVENOUS CONTINUOUS
Status: CANCELLED | OUTPATIENT
Start: 2022-01-05

## 2022-01-05 RX ADMIN — Medication 25 MCG: at 02:01

## 2022-01-05 RX ADMIN — SODIUM CHLORIDE, SODIUM LACTATE, POTASSIUM CHLORIDE, AND CALCIUM CHLORIDE: .6; .31; .03; .02 INJECTION, SOLUTION INTRAVENOUS at 09:01

## 2022-01-05 RX ADMIN — SODIUM CHLORIDE, SODIUM LACTATE, POTASSIUM CHLORIDE, AND CALCIUM CHLORIDE: .6; .31; .03; .02 INJECTION, SOLUTION INTRAVENOUS at 02:01

## 2022-01-05 RX ADMIN — Medication 25 MCG: at 06:01

## 2022-01-05 NOTE — SUBJECTIVE & OBJECTIVE
Obstetric HPI:  Patient reports None contractions, active fetal movement, No vaginal bleeding , No loss of fluid     This pregnancy has been complicated by asthma and gestational hypertension.     OB History    Para Term  AB Living   1 0 0 0 0 0   SAB IAB Ectopic Multiple Live Births   0 0 0 0 0      # Outcome Date GA Lbr Jeffrey/2nd Weight Sex Delivery Anes PTL Lv   1 Current              Past Medical History:   Diagnosis Date    Asthma     Eczema     Pilonidal cyst 2016     Past Surgical History:   Procedure Laterality Date    pilonidal cyst removal      TYMPANOSTOMY TUBE PLACEMENT Bilateral        PTA Medications   Medication Sig    albuterol (PROVENTIL) 2.5 mg /3 mL (0.083 %) nebulizer solution Take 3 mLs (2.5 mg total) by nebulization every 6 (six) hours as needed for Wheezing.    benzonatate (TESSALON) 100 MG capsule Take 2 capsules (200 mg total) by mouth 3 (three) times daily as needed for Cough.    famotidine (PEPCID) 20 MG tablet Take 1 tablet (20 mg total) by mouth 2 (two) times daily.    PRENATAL MULTI-DHA,WITH VIT K, 27 mg iron-800 mcg-260 mg Cap TAKE 1 CAPSULE BY MOUTH ONCE DAILY    mupirocin (BACTROBAN) 2 % ointment Apply topically 2 (two) times daily.    triamcinolone acetonide 0.1% (KENALOG) 0.1 % ointment Apply topically 2 (two) times daily. for 10 days       Review of patient's allergies indicates:  No Known Allergies     Family History     Problem Relation (Age of Onset)    Breast cancer Maternal Grandmother (28)    Cancer Maternal Grandmother, Paternal Grandmother    Diabetes Maternal Grandmother    Hypertension Father, Mother    Stroke Maternal Grandmother, Maternal Grandfather        Tobacco Use    Smoking status: Never Smoker    Smokeless tobacco: Never Used   Substance and Sexual Activity    Alcohol use: Not Currently     Alcohol/week: 0.0 standard drinks     Comment: seldomly    Drug use: No    Sexual activity: Yes     Partners: Male     Birth  control/protection: None, Coitus interruptus     Comment: Single     Review of Systems   Constitutional: Negative for appetite change, chills, fever and unexpected weight change.   Eyes: Negative for visual disturbance.   Respiratory: Negative for shortness of breath.    Cardiovascular: Negative for chest pain.   Gastrointestinal: Negative for abdominal pain, constipation, diarrhea, nausea and vomiting.   Genitourinary: Negative for dysmenorrhea, dyspareunia, pelvic pain, vaginal bleeding, vaginal discharge, postcoital bleeding and vaginal odor.   Musculoskeletal: Negative for back pain.   Integumentary:  Negative for acne.   Neurological: Negative for syncope and headaches.   Psychiatric/Behavioral: Negative for depression. The patient is not nervous/anxious.       Objective:     Vital Signs (Most Recent):  Temp: 97.3 °F (36.3 °C) (01/06/22 0719)  Pulse: (!) 58 (01/06/22 0817)  Resp: 18 (01/06/22 0719)  BP: 133/68 (01/06/22 0817)  SpO2: 100 % (01/06/22 0759) Vital Signs (24h Range):  Temp:  [97 °F (36.1 °C)-98 °F (36.7 °C)] 97.3 °F (36.3 °C)  Pulse:  [] 58  Resp:  [16-18] 18  SpO2:  [97 %-100 %] 100 %  BP: (121-171)/() 133/68     Weight: 85.3 kg (188 lb)  Body mass index is 32.27 kg/m².      Physical Exam:   Constitutional: She is oriented to person, place, and time. She appears well-developed and well-nourished. No distress.    HENT:   Head: Normocephalic and atraumatic.    Eyes: Pupils are equal, round, and reactive to light. EOM are normal.     Cardiovascular: Normal rate.     Pulmonary/Chest: Effort normal.                      Neurological: She is alert and oriented to person, place, and time.     Psychiatric: She has a normal mood and affect. Her behavior is normal. Judgment and thought content normal.       Cervix:  Dilation:  1  Effacement:  20%  Station: -4  Presentation: Vertex     Significant Labs:  Lab Results   Component Value Date    GROUPTRH A POS 09/10/2021    HEPBSAG Negative 05/27/2021     STREPBCULT No Group B Streptococcus isolated 12/27/2021       Labs pending

## 2022-01-05 NOTE — HPI
Patient is a 25 yo  at 37w4d presenting for induction of labor for gestational hypertension. She denies headache, chest pain or right upper quadrant pain. She is having some shortness of birth, but this is not new with her asthma.

## 2022-01-06 ENCOUNTER — ANESTHESIA (OUTPATIENT)
Dept: OBSTETRICS AND GYNECOLOGY | Facility: HOSPITAL | Age: 27
End: 2022-01-06
Payer: COMMERCIAL

## 2022-01-06 ENCOUNTER — ANESTHESIA EVENT (OUTPATIENT)
Dept: OBSTETRICS AND GYNECOLOGY | Facility: HOSPITAL | Age: 27
End: 2022-01-06
Payer: COMMERCIAL

## 2022-01-06 PROCEDURE — 25000003 PHARM REV CODE 250

## 2022-01-06 PROCEDURE — 25000003 PHARM REV CODE 250: Performed by: STUDENT IN AN ORGANIZED HEALTH CARE EDUCATION/TRAINING PROGRAM

## 2022-01-06 PROCEDURE — 01967 NEURAXL LBR ANES VAG DLVR: CPT | Mod: QZ | Performed by: NURSE ANESTHETIST, CERTIFIED REGISTERED

## 2022-01-06 PROCEDURE — 72100003 HC LABOR CARE, EA. ADDL. 8 HRS

## 2022-01-06 PROCEDURE — 72200005 HC VAGINAL DELIVERY LEVEL II

## 2022-01-06 PROCEDURE — 63600175 PHARM REV CODE 636 W HCPCS: Performed by: NURSE ANESTHETIST, CERTIFIED REGISTERED

## 2022-01-06 PROCEDURE — 59400 PR FULL ROUT OBSTE CARE,VAGINAL DELIV: ICD-10-PCS | Mod: AT,,, | Performed by: OBSTETRICS & GYNECOLOGY

## 2022-01-06 PROCEDURE — 63600175 PHARM REV CODE 636 W HCPCS: Performed by: STUDENT IN AN ORGANIZED HEALTH CARE EDUCATION/TRAINING PROGRAM

## 2022-01-06 PROCEDURE — 59400 OBSTETRICAL CARE: CPT | Mod: AT,,, | Performed by: OBSTETRICS & GYNECOLOGY

## 2022-01-06 PROCEDURE — 25000003 PHARM REV CODE 250: Performed by: NURSE ANESTHETIST, CERTIFIED REGISTERED

## 2022-01-06 PROCEDURE — 63600175 PHARM REV CODE 636 W HCPCS

## 2022-01-06 PROCEDURE — 11000001 HC ACUTE MED/SURG PRIVATE ROOM

## 2022-01-06 PROCEDURE — 51702 INSERT TEMP BLADDER CATH: CPT

## 2022-01-06 PROCEDURE — 62326 NJX INTERLAMINAR LMBR/SAC: CPT | Performed by: NURSE ANESTHETIST, CERTIFIED REGISTERED

## 2022-01-06 RX ORDER — LABETALOL HYDROCHLORIDE 5 MG/ML
20 INJECTION, SOLUTION INTRAVENOUS ONCE AS NEEDED
Status: COMPLETED | OUTPATIENT
Start: 2022-01-06 | End: 2022-01-06

## 2022-01-06 RX ORDER — FENTANYL CITRATE 50 UG/ML
INJECTION, SOLUTION INTRAMUSCULAR; INTRAVENOUS
Status: DISCONTINUED | OUTPATIENT
Start: 2022-01-06 | End: 2022-01-06

## 2022-01-06 RX ORDER — ACETAMINOPHEN 325 MG/1
650 TABLET ORAL EVERY 6 HOURS PRN
Status: DISCONTINUED | OUTPATIENT
Start: 2022-01-06 | End: 2022-01-08 | Stop reason: HOSPADM

## 2022-01-06 RX ORDER — HYDRALAZINE HYDROCHLORIDE 20 MG/ML
10 INJECTION INTRAMUSCULAR; INTRAVENOUS ONCE AS NEEDED
Status: DISCONTINUED | OUTPATIENT
Start: 2022-01-06 | End: 2022-01-06

## 2022-01-06 RX ORDER — FENTANYL/ROPIVACAINE/NS/PF 2MCG/ML-.2
PLASTIC BAG, INJECTION (ML) INJECTION
Status: COMPLETED
Start: 2022-01-06 | End: 2022-01-06

## 2022-01-06 RX ORDER — LABETALOL HYDROCHLORIDE 5 MG/ML
40 INJECTION, SOLUTION INTRAVENOUS ONCE AS NEEDED
Status: COMPLETED | OUTPATIENT
Start: 2022-01-06 | End: 2022-01-06

## 2022-01-06 RX ORDER — PROCHLORPERAZINE EDISYLATE 5 MG/ML
5 INJECTION INTRAMUSCULAR; INTRAVENOUS EVERY 6 HOURS PRN
Status: DISCONTINUED | OUTPATIENT
Start: 2022-01-06 | End: 2022-01-08 | Stop reason: HOSPADM

## 2022-01-06 RX ORDER — LABETALOL HYDROCHLORIDE 5 MG/ML
INJECTION, SOLUTION INTRAVENOUS
Status: DISPENSED
Start: 2022-01-06 | End: 2022-01-06

## 2022-01-06 RX ORDER — ROPIVACAINE HYDROCHLORIDE 5 MG/ML
INJECTION, SOLUTION EPIDURAL; INFILTRATION; PERINEURAL
Status: DISPENSED
Start: 2022-01-06 | End: 2022-01-07

## 2022-01-06 RX ORDER — ONDANSETRON 8 MG/1
8 TABLET, ORALLY DISINTEGRATING ORAL EVERY 8 HOURS PRN
Status: DISCONTINUED | OUTPATIENT
Start: 2022-01-06 | End: 2022-01-08 | Stop reason: HOSPADM

## 2022-01-06 RX ORDER — LABETALOL HYDROCHLORIDE 5 MG/ML
INJECTION, SOLUTION INTRAVENOUS
Status: COMPLETED
Start: 2022-01-06 | End: 2022-01-06

## 2022-01-06 RX ORDER — LABETALOL HYDROCHLORIDE 5 MG/ML
80 INJECTION, SOLUTION INTRAVENOUS ONCE AS NEEDED
Status: DISCONTINUED | OUTPATIENT
Start: 2022-01-06 | End: 2022-01-08 | Stop reason: HOSPADM

## 2022-01-06 RX ORDER — SIMETHICONE 80 MG
1 TABLET,CHEWABLE ORAL EVERY 6 HOURS PRN
Status: DISCONTINUED | OUTPATIENT
Start: 2022-01-06 | End: 2022-01-08 | Stop reason: HOSPADM

## 2022-01-06 RX ORDER — OXYTOCIN/RINGER'S LACTATE 30/500 ML
95 PLASTIC BAG, INJECTION (ML) INTRAVENOUS ONCE
Status: DISCONTINUED | OUTPATIENT
Start: 2022-01-06 | End: 2022-01-08 | Stop reason: HOSPADM

## 2022-01-06 RX ORDER — PRENATAL WITH FERROUS FUM AND FOLIC ACID 3080; 920; 120; 400; 22; 1.84; 3; 20; 10; 1; 12; 200; 27; 25; 2 [IU]/1; [IU]/1; MG/1; [IU]/1; MG/1; MG/1; MG/1; MG/1; MG/1; MG/1; UG/1; MG/1; MG/1; MG/1; MG/1
1 TABLET ORAL DAILY
Status: DISCONTINUED | OUTPATIENT
Start: 2022-01-07 | End: 2022-01-08 | Stop reason: HOSPADM

## 2022-01-06 RX ORDER — KETOROLAC TROMETHAMINE 30 MG/ML
30 INJECTION, SOLUTION INTRAMUSCULAR; INTRAVENOUS EVERY 8 HOURS
Status: CANCELLED | OUTPATIENT
Start: 2022-01-06 | End: 2022-01-07

## 2022-01-06 RX ORDER — FENTANYL/ROPIVACAINE/NS/PF 2MCG/ML-.2
PLASTIC BAG, INJECTION (ML) INJECTION CONTINUOUS PRN
Status: DISCONTINUED | OUTPATIENT
Start: 2022-01-06 | End: 2022-01-06

## 2022-01-06 RX ORDER — ONDANSETRON 8 MG/1
TABLET, ORALLY DISINTEGRATING ORAL
Status: COMPLETED
Start: 2022-01-06 | End: 2022-01-06

## 2022-01-06 RX ORDER — DIPHENHYDRAMINE HYDROCHLORIDE 50 MG/ML
25 INJECTION INTRAMUSCULAR; INTRAVENOUS EVERY 4 HOURS PRN
Status: DISCONTINUED | OUTPATIENT
Start: 2022-01-06 | End: 2022-01-08 | Stop reason: HOSPADM

## 2022-01-06 RX ORDER — LABETALOL HYDROCHLORIDE 5 MG/ML
40 INJECTION, SOLUTION INTRAVENOUS ONCE AS NEEDED
Status: DISCONTINUED | OUTPATIENT
Start: 2022-01-06 | End: 2022-01-06

## 2022-01-06 RX ORDER — FENTANYL/ROPIVACAINE/NS/PF 2MCG/ML-.2
PLASTIC BAG, INJECTION (ML) INJECTION
Status: DISPENSED
Start: 2022-01-06 | End: 2022-01-07

## 2022-01-06 RX ORDER — IBUPROFEN 800 MG/1
800 TABLET ORAL EVERY 8 HOURS
Status: CANCELLED | OUTPATIENT
Start: 2022-01-07

## 2022-01-06 RX ORDER — ROPIVACAINE HYDROCHLORIDE 5 MG/ML
INJECTION, SOLUTION EPIDURAL; INFILTRATION; PERINEURAL
Status: COMPLETED
Start: 2022-01-06 | End: 2022-01-06

## 2022-01-06 RX ORDER — HYDROCODONE BITARTRATE AND ACETAMINOPHEN 5; 325 MG/1; MG/1
1 TABLET ORAL EVERY 4 HOURS PRN
Status: DISCONTINUED | OUTPATIENT
Start: 2022-01-06 | End: 2022-01-08 | Stop reason: HOSPADM

## 2022-01-06 RX ORDER — LABETALOL HYDROCHLORIDE 5 MG/ML
80 INJECTION, SOLUTION INTRAVENOUS ONCE AS NEEDED
Status: DISCONTINUED | OUTPATIENT
Start: 2022-01-06 | End: 2022-01-06

## 2022-01-06 RX ORDER — SODIUM CHLORIDE 0.9 % (FLUSH) 0.9 %
10 SYRINGE (ML) INJECTION
Status: DISCONTINUED | OUTPATIENT
Start: 2022-01-06 | End: 2022-01-08 | Stop reason: HOSPADM

## 2022-01-06 RX ORDER — ONDANSETRON 8 MG/1
TABLET, ORALLY DISINTEGRATING ORAL
Status: DISPENSED
Start: 2022-01-06 | End: 2022-01-06

## 2022-01-06 RX ORDER — FENTANYL CITRATE 50 UG/ML
INJECTION, SOLUTION INTRAMUSCULAR; INTRAVENOUS
Status: COMPLETED
Start: 2022-01-06 | End: 2022-01-06

## 2022-01-06 RX ORDER — OXYTOCIN/RINGER'S LACTATE 30/500 ML
PLASTIC BAG, INJECTION (ML) INTRAVENOUS
Status: COMPLETED
Start: 2022-01-06 | End: 2022-01-06

## 2022-01-06 RX ORDER — DIPHENHYDRAMINE HCL 25 MG
25 CAPSULE ORAL EVERY 4 HOURS PRN
Status: DISCONTINUED | OUTPATIENT
Start: 2022-01-06 | End: 2022-01-08 | Stop reason: HOSPADM

## 2022-01-06 RX ORDER — DOCUSATE SODIUM 100 MG/1
200 CAPSULE, LIQUID FILLED ORAL 2 TIMES DAILY PRN
Status: DISCONTINUED | OUTPATIENT
Start: 2022-01-06 | End: 2022-01-08 | Stop reason: HOSPADM

## 2022-01-06 RX ORDER — LABETALOL HYDROCHLORIDE 5 MG/ML
20 INJECTION, SOLUTION INTRAVENOUS ONCE AS NEEDED
Status: DISCONTINUED | OUTPATIENT
Start: 2022-01-06 | End: 2022-01-08 | Stop reason: HOSPADM

## 2022-01-06 RX ORDER — HYDRALAZINE HYDROCHLORIDE 20 MG/ML
10 INJECTION INTRAMUSCULAR; INTRAVENOUS ONCE AS NEEDED
Status: DISCONTINUED | OUTPATIENT
Start: 2022-01-06 | End: 2022-01-08 | Stop reason: HOSPADM

## 2022-01-06 RX ADMIN — SODIUM CHLORIDE, SODIUM LACTATE, POTASSIUM CHLORIDE, AND CALCIUM CHLORIDE: .6; .31; .03; .02 INJECTION, SOLUTION INTRAVENOUS at 03:01

## 2022-01-06 RX ADMIN — ONDANSETRON 8 MG: 8 TABLET, ORALLY DISINTEGRATING ORAL at 07:01

## 2022-01-06 RX ADMIN — ONDANSETRON 8 MG: 8 TABLET, ORALLY DISINTEGRATING ORAL at 09:01

## 2022-01-06 RX ADMIN — SODIUM CHLORIDE, SODIUM LACTATE, POTASSIUM CHLORIDE, AND CALCIUM CHLORIDE: .6; .31; .03; .02 INJECTION, SOLUTION INTRAVENOUS at 02:01

## 2022-01-06 RX ADMIN — FENTANYL CITRATE 100 MCG: 50 INJECTION, SOLUTION INTRAMUSCULAR; INTRAVENOUS at 03:01

## 2022-01-06 RX ADMIN — LABETALOL HYDROCHLORIDE 40 MG: 5 INJECTION, SOLUTION INTRAVENOUS at 09:01

## 2022-01-06 RX ADMIN — SODIUM CHLORIDE, SODIUM LACTATE, POTASSIUM CHLORIDE, AND CALCIUM CHLORIDE: .6; .31; .03; .02 INJECTION, SOLUTION INTRAVENOUS at 06:01

## 2022-01-06 RX ADMIN — SODIUM CHLORIDE, SODIUM LACTATE, POTASSIUM CHLORIDE, AND CALCIUM CHLORIDE: .6; .31; .03; .02 INJECTION, SOLUTION INTRAVENOUS at 01:01

## 2022-01-06 RX ADMIN — Medication 2 MILLI-UNITS/MIN: at 04:01

## 2022-01-06 RX ADMIN — FENTANYL CIT 0.2 MG/100ML-ROPIV 0.2%-NACL 0.9% EPIDURAL INJ 12 ML/HR: 2/0.2 SOLUTION at 12:01

## 2022-01-06 RX ADMIN — LABETALOL HYDROCHLORIDE 20 MG: 5 INJECTION, SOLUTION INTRAVENOUS at 08:01

## 2022-01-06 RX ADMIN — FENTANYL CIT 0.2 MG/100ML-ROPIV 0.2%-NACL 0.9% EPIDURAL INJ 12 ML/HR: 2/0.2 SOLUTION at 04:01

## 2022-01-06 RX ADMIN — ROPIVACAINE HYDROCHLORIDE: 5 INJECTION, SOLUTION EPIDURAL; INFILTRATION; PERINEURAL at 03:01

## 2022-01-06 RX ADMIN — FENTANYL CITRATE: 50 INJECTION, SOLUTION INTRAMUSCULAR; INTRAVENOUS at 03:01

## 2022-01-06 RX ADMIN — SODIUM CHLORIDE, SODIUM LACTATE, POTASSIUM CHLORIDE, AND CALCIUM CHLORIDE: .6; .31; .03; .02 INJECTION, SOLUTION INTRAVENOUS at 07:01

## 2022-01-06 NOTE — ANESTHESIA PREPROCEDURE EVALUATION
01/06/2022  Carline Iqbal is a 26 y.o., female.    Anesthesia Evaluation    I have reviewed the Patient Summary Reports.    I have reviewed the Nursing Notes.    I have reviewed the Medications.     Review of Systems  Anesthesia Hx:  No problems with previous Anesthesia    Social:  Non-Smoker, No Alcohol Use    Hematology/Oncology:  Hematology Normal   Oncology Normal     EENT/Dental:EENT/Dental Normal   Cardiovascular:   Exercise tolerance: good Hypertension    Pulmonary:   Asthma mild    Renal/:  Renal/ Normal     Hepatic/GI:  Hepatic/GI Normal    Musculoskeletal:  Musculoskeletal Normal    Neurological:  Neurology Normal    Endocrine:  Endocrine Normal    Dermatological:  Skin Normal    Psych:  Psychiatric Normal           Physical Exam  General:  Well nourished    Airway/Jaw/Neck:  Airway Findings: Mouth Opening: Normal Tongue: Normal  General Airway Assessment: Adult  Mallampati: II  TM Distance: Normal, at least 6 cm      Dental:  Dental Findings: In tact             Anesthesia Plan  Type of Anesthesia, risks & benefits discussed:  Anesthesia Type:  epidural, spinal, general    Patient's Preference:   Plan Factors:          Intra-op Monitoring Plan: standard ASA monitors  Intra-op Monitoring Plan Comments:   Post Op Pain Control Plan: per primary service following discharge from PACU and multimodal analgesia  Post Op Pain Control Plan Comments:     Induction:   IV  Beta Blocker:  Patient is not currently on a Beta-Blocker (No further documentation required).       Informed Consent: Patient understands risks and agrees with Anesthesia plan.  Questions answered. Anesthesia consent signed with patient.  ASA Score: 2     Day of Surgery Review of History & Physical: I have interviewed and examined the patient. I have reviewed the patient's H&P dated: 1/6/22. There are no significant changes.  H&P  update referred to the surgeon.         Ready For Surgery From Anesthesia Perspective.

## 2022-01-06 NOTE — H&P
[facility] - Labor & Delivery  Obstetrics  History & Physical    Patient Name: Carline Iqbal  MRN: [id number]  Admission Date: 2022  Primary Care Provider: Vickie Chicas MD    Subjective:     Principal Problem:Gestational hypertension, third trimester    History of Present Illness:  Patient is a 25 yo  at 37w4d presenting for induction of labor for gestational hypertension. She denies headache, chest pain or right upper quadrant pain. She is having some shortness of birth, but this is not new with her asthma.       Obstetric HPI:  Patient reports None contractions, active fetal movement, No vaginal bleeding , No loss of fluid     This pregnancy has been complicated by asthma and gestational hypertension.     OB History    Para Term  AB Living   1 0 0 0 0 0   SAB IAB Ectopic Multiple Live Births   0 0 0 0 0      # Outcome Date GA Lbr Jeffrey/2nd Weight Sex Delivery Anes PTL Lv   1 Current              Past Medical History:   Diagnosis Date    Asthma     Eczema     Pilonidal cyst 2016     Past Surgical History:   Procedure Laterality Date    pilonidal cyst removal      TYMPANOSTOMY TUBE PLACEMENT Bilateral        PTA Medications   Medication Sig    albuterol (PROVENTIL) 2.5 mg /3 mL (0.083 %) nebulizer solution Take 3 mLs (2.5 mg total) by nebulization every 6 (six) hours as needed for Wheezing.    benzonatate (TESSALON) 100 MG capsule Take 2 capsules (200 mg total) by mouth 3 (three) times daily as needed for Cough.    famotidine (PEPCID) 20 MG tablet Take 1 tablet (20 mg total) by mouth 2 (two) times daily.    PRENATAL MULTI-DHA,WITH VIT K, 27 mg iron-800 mcg-260 mg Cap TAKE 1 CAPSULE BY MOUTH ONCE DAILY    mupirocin (BACTROBAN) 2 % ointment Apply topically 2 (two) times daily.    triamcinolone acetonide 0.1% (KENALOG) 0.1 % ointment Apply topically 2 (two) times daily. for 10 days       Review of patient's allergies indicates:  No Known Allergies     Family History      Problem Relation (Age of Onset)    Breast cancer Maternal Grandmother (28)    Cancer Maternal Grandmother, Paternal Grandmother    Diabetes Maternal Grandmother    Hypertension Father, Mother    Stroke Maternal Grandmother, Maternal Grandfather        Tobacco Use    Smoking status: Never Smoker    Smokeless tobacco: Never Used   Substance and Sexual Activity    Alcohol use: Not Currently     Alcohol/week: 0.0 standard drinks     Comment: seldomly    Drug use: No    Sexual activity: Yes     Partners: Male     Birth control/protection: None, Coitus interruptus     Comment: Single     Review of Systems   Constitutional: Negative for appetite change, chills, fever and unexpected weight change.   Eyes: Negative for visual disturbance.   Respiratory: Negative for shortness of breath.    Cardiovascular: Negative for chest pain.   Gastrointestinal: Negative for abdominal pain, constipation, diarrhea, nausea and vomiting.   Genitourinary: Negative for dysmenorrhea, dyspareunia, pelvic pain, vaginal bleeding, vaginal discharge, postcoital bleeding and vaginal odor.   Musculoskeletal: Negative for back pain.   Integumentary:  Negative for acne.   Neurological: Negative for syncope and headaches.   Psychiatric/Behavioral: Negative for depression. The patient is not nervous/anxious.       Objective:     Vital Signs (Most Recent):  Temp: 97.3 °F (36.3 °C) (01/06/22 0719)  Pulse: (!) 58 (01/06/22 0817)  Resp: 18 (01/06/22 0719)  BP: 133/68 (01/06/22 0817)  SpO2: 100 % (01/06/22 0759) Vital Signs (24h Range):  Temp:  [97 °F (36.1 °C)-98 °F (36.7 °C)] 97.3 °F (36.3 °C)  Pulse:  [] 58  Resp:  [16-18] 18  SpO2:  [97 %-100 %] 100 %  BP: (121-171)/() 133/68     Weight: 85.3 kg (188 lb)  Body mass index is 32.27 kg/m².      Physical Exam:   Constitutional: She is oriented to person, place, and time. She appears well-developed and well-nourished. No distress.    HENT:   Head: Normocephalic and atraumatic.    Eyes: Pupils  are equal, round, and reactive to light. EOM are normal.     Cardiovascular: Normal rate.     Pulmonary/Chest: Effort normal.                      Neurological: She is alert and oriented to person, place, and time.     Psychiatric: She has a normal mood and affect. Her behavior is normal. Judgment and thought content normal.       Cervix:  Dilation:  1  Effacement:  20%  Station: -4  Presentation: Vertex     Significant Labs:  Lab Results   Component Value Date    GROUPTRH A POS 09/10/2021    HEPBSAG Negative 2021    STREPBCULT No Group B Streptococcus isolated 2021       Labs pending    Assessment/Plan:     26 y.o. female  at 37w5d for:    No notes have been filed under this hospital service.  Service: Obstetrics and Gynecology      Arline Bartholomew MD  Obstetrics  [facility] - Labor & Delivery

## 2022-01-06 NOTE — NURSING
1140- Dr. Rodriguez on unit viewing strip. Updated on FHTs, ctx pattern, vag exam. No new orders.    1424- Dr. Rodriguez called for update. Updated on FHTs, ctx pattern, vag exam. No new orders.    1725- Dr. Rodriguez at bedside. Updated on cervical exam, FHTs, ctx pattern. No new orders.

## 2022-01-06 NOTE — ANESTHESIA PROCEDURE NOTES
Epidural    Patient location during procedure: OB   Reason for block: primary anesthetic   Reason for block: labor analgesia requested by patient and obstetrician  Diagnosis: labor pain   Start time: 1/6/2022 3:43 AM  Timeout: 1/6/2022 3:42 AM  End time: 1/6/2022 3:58 AM  Surgery related to: pregnacy    Staffing  Performing Provider: Frandy Bhatti CRNA  Authorizing Provider: Nico Dai CRNA    Staffing  Performed: resident/CRNA   Resident/CRNA: Frandy Bhatti CRNA      Preanesthetic Checklist  Completed: patient identified, IV checked, site marked, risks and benefits discussed, surgical consent, monitors and equipment checked, pre-op evaluation, timeout performed, anesthesia consent given, hand hygiene performed and patient being monitored  Preparation  Patient position: sitting  Prep: ChloraPrep  Patient monitoring: ECG, Pulse Ox, continuous capnometry and Blood Pressure  Reason for block: primary anesthetic   Epidural  Skin Anesthetic: lidocaine 1%  Skin Wheal: 5 mL  Administration type: continuous  Approach: midline  Interspace: L3-4    Injection technique: JAZMÍN saline  Needle and Epidural Catheter  Needle type: Tuohy   Needle gauge: 18  Needle length: 3.5 inches  Needle insertion depth: 7 cm  Catheter type: multi-orifice  Catheter size: 20 G  Catheter at skin depth: 12 cm  Insertion Attempts: 1  Test dose: 3 mL of lidocaine 1.5% with Epi 1-to-200,000  Additional Documentation: incremental injection, negative aspiration for heme and CSF, no paresthesia on injection, no signs/symptoms of IV or SA injection, no significant pain on injection and no significant complaints from patient  Needle localization: anatomical landmarks  Medications:  Bolus administered: 10 mL of 0.2% ropivacaine  Assessment  Upper dermatomal levels - Left: T6  Right: T6  Lower dermatomal level: T6   Dermatomal levels determined by alcohol wipe  Ease of block: easy  Patient's tolerance of the procedure: comfortable throughout block and  no complaintsNo inadvertent dural puncture with Tuohy.  Dural puncture performed with spinal needle.

## 2022-01-07 LAB
BASOPHILS # BLD AUTO: 0.02 K/UL (ref 0–0.2)
BASOPHILS NFR BLD: 0.1 % (ref 0–1.9)
DIFFERENTIAL METHOD: ABNORMAL
EOSINOPHIL # BLD AUTO: 0 K/UL (ref 0–0.5)
EOSINOPHIL NFR BLD: 0.2 % (ref 0–8)
ERYTHROCYTE [DISTWIDTH] IN BLOOD BY AUTOMATED COUNT: 17.6 % (ref 11.5–14.5)
HCT VFR BLD AUTO: 30.6 % (ref 37–48.5)
HGB BLD-MCNC: 9.6 G/DL (ref 12–16)
IMM GRANULOCYTES # BLD AUTO: 0.06 K/UL (ref 0–0.04)
IMM GRANULOCYTES NFR BLD AUTO: 0.4 % (ref 0–0.5)
LYMPHOCYTES # BLD AUTO: 2.6 K/UL (ref 1–4.8)
LYMPHOCYTES NFR BLD: 17.2 % (ref 18–48)
MCH RBC QN AUTO: 23.1 PG (ref 27–31)
MCHC RBC AUTO-ENTMCNC: 31.4 G/DL (ref 32–36)
MCV RBC AUTO: 74 FL (ref 82–98)
MONOCYTES # BLD AUTO: 0.7 K/UL (ref 0.3–1)
MONOCYTES NFR BLD: 4.3 % (ref 4–15)
NEUTROPHILS # BLD AUTO: 11.9 K/UL (ref 1.8–7.7)
NEUTROPHILS NFR BLD: 77.8 % (ref 38–73)
NRBC BLD-RTO: 0 /100 WBC
PLATELET # BLD AUTO: 204 K/UL (ref 150–450)
PMV BLD AUTO: 12.3 FL (ref 9.2–12.9)
RBC # BLD AUTO: 4.15 M/UL (ref 4–5.4)
WBC # BLD AUTO: 15.28 K/UL (ref 3.9–12.7)

## 2022-01-07 PROCEDURE — 11000001 HC ACUTE MED/SURG PRIVATE ROOM

## 2022-01-07 PROCEDURE — 25000003 PHARM REV CODE 250: Performed by: OBSTETRICS & GYNECOLOGY

## 2022-01-07 PROCEDURE — 99232 SBSQ HOSP IP/OBS MODERATE 35: CPT | Mod: ,,, | Performed by: OBSTETRICS & GYNECOLOGY

## 2022-01-07 PROCEDURE — 36415 COLL VENOUS BLD VENIPUNCTURE: CPT | Performed by: OBSTETRICS & GYNECOLOGY

## 2022-01-07 PROCEDURE — 85025 COMPLETE CBC W/AUTO DIFF WBC: CPT | Performed by: OBSTETRICS & GYNECOLOGY

## 2022-01-07 PROCEDURE — 99232 PR SUBSEQUENT HOSPITAL CARE,LEVL II: ICD-10-PCS | Mod: ,,, | Performed by: OBSTETRICS & GYNECOLOGY

## 2022-01-07 RX ORDER — ACETAMINOPHEN 325 MG/1
TABLET ORAL
Status: DISPENSED
Start: 2022-01-07 | End: 2022-01-07

## 2022-01-07 RX ORDER — IBUPROFEN 600 MG/1
600 TABLET ORAL EVERY 6 HOURS PRN
Status: DISCONTINUED | OUTPATIENT
Start: 2022-01-07 | End: 2022-01-08 | Stop reason: HOSPADM

## 2022-01-07 RX ADMIN — DOCUSATE SODIUM 200 MG: 100 CAPSULE ORAL at 08:01

## 2022-01-07 RX ADMIN — PRENATAL VIT W/ FE FUMARATE-FA TAB 27-0.8 MG 1 TABLET: 27-0.8 TAB at 08:01

## 2022-01-07 RX ADMIN — ACETAMINOPHEN 650 MG: 325 TABLET ORAL at 07:01

## 2022-01-07 RX ADMIN — IBUPROFEN 600 MG: 600 TABLET ORAL at 12:01

## 2022-01-07 RX ADMIN — ACETAMINOPHEN 650 MG: 325 TABLET ORAL at 05:01

## 2022-01-07 RX ADMIN — ACETAMINOPHEN 650 MG: 325 TABLET ORAL at 12:01

## 2022-01-07 NOTE — PROGRESS NOTES
[facility]  Obstetrics  Postpartum Progress Note    Patient Name: Carline Iqbal  MRN: [id number]  Admission Date: 2022  Hospital Length of Stay: 2 days  Attending Physician: Arline Bartholomew*  Primary Care Provider: Vickie Chicas MD    Subjective:     Principal Problem:Gestational hypertension, third trimester    Hospital Course:  HD#1 Admitted for IOL secondary to elevated BPs  HD#2 Uncomplicated   HD#3 Routine PP care; monitor BPs      Interval History:     She is doing well this morning. She is tolerating a regular diet without nausea or vomiting. She is voiding spontaneously. She is ambulating. She has passed flatus, and has not a BM. Vaginal bleeding is mild. She denies fever or chills. Abdominal pain is mild and controlled with oral medications. She Is not breastfeeding. She desires circumcision for her male baby: not applicable.    Objective:     Vital Signs (Most Recent):  Temp: 97 °F (36.1 °C) (22 0800)  Pulse: 66 (22 0800)  Resp: 18 (22 0800)  BP: 135/82 (22 0800)  SpO2: 99 % (22 0800) Vital Signs (24h Range):  Temp:  [97 °F (36.1 °C)-99.4 °F (37.4 °C)] 97 °F (36.1 °C)  Pulse:  [55-90] 66  Resp:  [16-18] 18  SpO2:  [97 %-100 %] 99 %  BP: (124-186)/() 135/82     Weight: 85.3 kg (188 lb)  Body mass index is 32.27 kg/m².      Intake/Output Summary (Last 24 hours) at 2022 0929  Last data filed at 2022 2346  Gross per 24 hour   Intake 2437.2 ml   Output 3504 ml   Net -1066.8 ml         Significant Labs:  Lab Results   Component Value Date    GROUPTRH A POS 09/10/2021    HEPBSAG Negative 2021    STREPBCULT No Group B Streptococcus isolated 2021     Recent Labs   Lab 22  0546   HGB 9.6*   HCT 30.6*       I have personallly reviewed all pertinent lab results from the last 24 hours.    Physical Exam:   Constitutional: She is oriented to person, place, and time. She appears well-developed and well-nourished.     HENT:   Head: Normocephalic and atraumatic.    Eyes: Pupils are equal, round, and reactive to light. EOM are normal.     Cardiovascular: Normal rate and regular rhythm.     Pulmonary/Chest: Effort normal and breath sounds normal.        Abdominal: Soft. Bowel sounds are normal.     Genitourinary:    Vagina and uterus normal.             Musculoskeletal: Normal range of motion and moves all extremeties.       Neurological: She is alert and oriented to person, place, and time.    Skin: Skin is warm and dry.    Psychiatric: She has a normal mood and affect.       Assessment/Plan:     26 y.o. female  for:    * Gestational hypertension, third trimester  Monitor BPs     (normal spontaneous vaginal delivery)  Routine PP care    Asthma affecting pregnancy in third trimester  Noted        Disposition: As patient meets milestones, will plan to discharge tomorrow.    Roseanne Rodriguez MD  Obstetrics  [facility] - [facility]

## 2022-01-07 NOTE — PLAN OF CARE
Stable condition. VS WNL. Lungs clear. Pain is being managed with oral medications. Self camilla-care and reports light vaginal bleeding. Fundus firm without massage.   Voiding without difficulty. 2nd degree perineal laceration/repaired, using tucks and dermaplast. Showered today. Linens changed. Tolerating regular diet and drinking adequate amounts of fluids. Bonding appropriately with .      LACTATION NOTE: Mother has not called for assistance with latching.  ANTIONE Macdonald RN, IBL[facility] assessed a feeding this am and LATCH score given. BF feeding log kept up to date.

## 2022-01-07 NOTE — PLAN OF CARE
Assisted mother with bf at this time. Mother confident that bf is going well. Bf basics reviewed, see lactation note.

## 2022-01-07 NOTE — SUBJECTIVE & OBJECTIVE
Interval History:     She is doing well this morning. She is tolerating a regular diet without nausea or vomiting. She is voiding spontaneously. She is ambulating. She has passed flatus, and has not a BM. Vaginal bleeding is mild. She denies fever or chills. Abdominal pain is mild and controlled with oral medications. She Is not breastfeeding. She desires circumcision for her male baby: not applicable.    Objective:     Vital Signs (Most Recent):  Temp: 97 °F (36.1 °C) (01/07/22 0800)  Pulse: 66 (01/07/22 0800)  Resp: 18 (01/07/22 0800)  BP: 135/82 (01/07/22 0800)  SpO2: 99 % (01/07/22 0800) Vital Signs (24h Range):  Temp:  [97 °F (36.1 °C)-99.4 °F (37.4 °C)] 97 °F (36.1 °C)  Pulse:  [55-90] 66  Resp:  [16-18] 18  SpO2:  [97 %-100 %] 99 %  BP: (124-186)/() 135/82     Weight: 85.3 kg (188 lb)  Body mass index is 32.27 kg/m².      Intake/Output Summary (Last 24 hours) at 1/7/2022 0929  Last data filed at 1/6/2022 2346  Gross per 24 hour   Intake 2437.2 ml   Output 3504 ml   Net -1066.8 ml         Significant Labs:  Lab Results   Component Value Date    GROUPTRH A POS 09/10/2021    HEPBSAG Negative 05/27/2021    STREPBCULT No Group B Streptococcus isolated 12/27/2021     Recent Labs   Lab 01/07/22  0546   HGB 9.6*   HCT 30.6*       I have personallly reviewed all pertinent lab results from the last 24 hours.    Physical Exam:   Constitutional: She is oriented to person, place, and time. She appears well-developed and well-nourished.    HENT:   Head: Normocephalic and atraumatic.    Eyes: Pupils are equal, round, and reactive to light. EOM are normal.     Cardiovascular: Normal rate and regular rhythm.     Pulmonary/Chest: Effort normal and breath sounds normal.        Abdominal: Soft. Bowel sounds are normal.     Genitourinary:    Vagina and uterus normal.             Musculoskeletal: Normal range of motion and moves all extremeties.       Neurological: She is alert and oriented to person, place, and time.     Skin: Skin is warm and dry.    Psychiatric: She has a normal mood and affect.

## 2022-01-07 NOTE — NURSING
Assumed care of pt.  Assessment complete, see flowsheet.  Pt repositioned to high flowler's with legs in butterfly position. Pt denies pain, c/o of nausea cool cloth given and medicated per MAR.  Plan of care reviewed with pt and family members.  Pt denies unmet needs at this time.

## 2022-01-07 NOTE — PLAN OF CARE
S/P vaginal delivery. Vital signs WDL. Up to restroom independently and self pericare. Positive void after delivery. Pt wishing to shower later this AM. Fundus firm and 1cm below umbilicus. Lochia rubra and light. Breastfeeding with latch assistance. Assisting with deeper latch due to large nipples to prevent nipple discomfort. Baby nursing well once latched. S.O  at bedside tending to patient and baby. Questions/concerns answered.

## 2022-01-07 NOTE — LACTATION NOTE
This note was copied from a baby's chart.     01/07/22 1023   Maternal Assessment   Breast Size Issue none   Breast Density Bilateral:;soft   Areola Bilateral:;elastic   Nipples Bilateral:;everted;bulbous   Maternal Infant Feeding   Maternal Emotional State relaxed;assist needed  (minimal assistance needed)   Infant Positioning cross-cradle   Signs of Milk Transfer infant jaw motion present;suck/swallow ratio   Pain with Feeding no   Latch Assistance yes     Mother reports that bf is going well. States that she was just about to wake baby to bf, last feeding about 4 hours ago. Infant awake and quiet in OC. Changed stool diaper and handed to mother. Mother needs no assistance with positioning, mother has large bulbus nipples. Infant with shallow initially but easily able to correct. Mother reports comfort with latch. Infant continues rhythmically sucking. Mother ordered breast pump, states that it should arrive soon.     Basic Breastfeeding Instructions    The more you nurse the baby the more milk you will make.  Avoid bottles and pacifiers for the first 4 weeks.  Feed your baby only breastmik for the first 6 months.  Feed your baby at the earliest sign of hunger or comfort:  Sucking on fingers or hands  Bringing hands toward his mouth  Rooting or reaching for something to suck on  Sucking motions with mouth  Fretful noises  Crying is a late sign of hunger or comfort.  The baby should be positioned and latched on to the breast correctly  Chest-to-chest, chin in the breast  Babys lips are flipped outward  Babys mouth is stretched open wide like a shout  Babys sucking should feel like tugging to the mother  - The baby should be drinking at the breast  You should hear an occasional swallow during the feeding  Switch breasts when the baby takes himself off the breast or falls asleep  Keep offering breasts until the baby looks full, no longer gives hunger signs, and stays asleep when placed on his back in the  crib  - If the baby is sleepy and wont wake for a feeding, put the baby skin-to-skin dressed in a diaper against the mothers bare chest  - Sleep with your baby near you in the hospital room  - Call the nurse for additional assistance as needed.    Parents deny questions or needs at this time, encouraged to call with any needs, v/u.

## 2022-01-07 NOTE — NURSING TRANSFER
Nursing Transfer Note      1/7/2022     Reason patient is being transferred: Postpartum    Transfer to [facility] room 322    Transfer via w/c    Transfer with infant in arms     Transported by Sonali BLAKELY and Vonnie BLAKELY    Medicines sent: labetalol      Report to Sonali SANTOS RN

## 2022-01-07 NOTE — ANESTHESIA POSTPROCEDURE EVALUATION
Anesthesia Post Evaluation    Patient: Carline Iqbal    Procedure(s) Performed: * No procedures listed *    Final Anesthesia Type: epidural      Patient location during evaluation: labor & delivery  Patient participation: Yes- Able to Participate  Level of consciousness: awake and alert, oriented and awake  Post-procedure vital signs: reviewed and stable  Pain management: adequate  Airway patency: patent    PONV status at discharge: No PONV  Anesthetic complications: no      Cardiovascular status: blood pressure returned to baseline, hemodynamically stable and stable  Respiratory status: unassisted, spontaneous ventilation and room air  Hydration status: euvolemic  Follow-up not needed.          Vitals Value Taken Time   /97 01/06/22 2048   Temp 36.7 °C (98.1 °F) 01/06/22 1905   Pulse 0 01/06/22 2103   Resp 18 01/06/22 1905   SpO2 100 % 01/06/22 2059   Vitals shown include unvalidated device data.      No case tracking events are documented in the log.      Pain/Manisha Score: Pain Rating Prior to Med Admin: 6 (1/6/2022  3:00 PM)

## 2022-01-07 NOTE — HOSPITAL COURSE
HD#1 Admitted for IOL secondary to elevated BPs  HD#2 Uncomplicated   HD#3 Routine PP care; monitor BPs  HD#4 Discharge

## 2022-01-07 NOTE — NURSING
Pt in stable condition. VSS, NAD. Mother baby bonding noted. PIV saline locked.  Uterus firm, midline and @ 1 below umbilicus. Vaginal bleeding light, no clots noted or reported. pt Ambulating to bathroom independently, voiding without difficulty.  Pt reports mild back pain that is relieved with pain medications and rest. Pt doing well with breastfeeding sessions, assisted and educated by Sonali Henriquez.  Pt keeping track of feeding in breastfeeding log that remains at bedside. Pt v/u to notify nurse or lactation consultant if questions or concerns present with breastfeeding.   S/O remains at beside to provide assistance and support. All questions/concerns answered to pt satisfaction.

## 2022-01-07 NOTE — LACTATION NOTE
This note was copied from a baby's chart.  Assessed first feeding immediately after birth. Assisted mother with latch to get a deeper latch due to larger nipples to prevent nipple discomfort.  Education provided on latch, positioning,milk transfer and importance of baby led feeding on cue (8 or more times daily) and use of hand expression. LATCH score complete. Reviewed the risk associated with use of pacifiers and reasons to avoid artificial nipples. Encouraged mother to use Breastfeeding Guide to track feedings and output. Questions/ Concerns answered. Mother verbalizes understanding.

## 2022-01-07 NOTE — L&D DELIVERY NOTE
"[place] - Labor & Delivery  Vaginal Delivery   Operative Note    SUMMARY     Normal spontaneous vaginal delivery of live infant, was placed on mothers abdomen for skin to skin and bulb suctioning performed.  Infant delivered position TERRENCE over perineum.  Nuchal cord: No.    Spontaneous delivery of placenta and IV pitocin given noting good uterine tone.  2nd degree laceration noted and repaired in normal fashion with Two 0 Vicryl .  Patient tolerated delivery well. Sponge needle and lap counted correctly x2.    Indications: Gestational hypertension, third trimester  Pregnancy complicated by:   Patient Active Problem List   Diagnosis    Pilonidal cyst    Asthma, mild intermittent, well-controlled    Flexural eczema    Iron deficiency    Mass of stomach    Gestational hypertension, third trimester    Asthma affecting pregnancy in third trimester     (normal spontaneous vaginal delivery)     Admitting GA: 37w5d    Delivery Information for Girl Carline Iqbal    Birth information:  YOB: 2023   Time of birth: 9:00 PM   Sex: female   Head Delivery Date/Time: 2022  9:00 PM   Delivery type: Vaginal, Spontaneous   Gestational Age: 37w5d    Delivery Providers    Delivering clinician: Joe Rodriguez MD   Provider Role    Vonnie Meng RN Registered Nurse    Vera Suarez [facility] Person    Claire Bolaños RN Registered Nurse    Adelaide Brunson RN Registered Nurse    Sonali Brown RN Registered Nurse    Sissy Adam RN Registered Nurse              Measurements    Weight: 3090 g  Weight (lbs): 6 lb 13 oz  Length: 48.9 cm  Length (in): 19.25"  Head circumference: 34.3 cm  Chest circumference: 33 cm  Abdominal girth: 30.5 cm         Apgars    Living status: Living  Apgars:  1 min.:  5 min.:  10 min.:  15 min.:  20 min.:    Skin color:  1  1       Heart rate:  2  2       Reflex irritability:  2  2       Muscle tone:  2  2       Respiratory effort:  2  2       Total:  9  9          "       Operative Delivery    Forceps attempted?: No  Vacuum extractor attempted?: No         Shoulder Dystocia    Shoulder dystocia present?: No           Presentation    Presentation: Vertex  Position: Middle Occiput Anterior           Interventions/Resuscitation    Method: Bulb Suctioning, Tactile Stimulation       Cord    Vessels: 3 vessels  Complications: None  Delayed Cord Clamping?: Yes  Cord Clamped Date/Time: 2022  9:01 PM  Cord Blood Disposition: Lab  Gases Sent?: No  Stem Cell Collection (by MD): No       Placenta    Placenta delivery date/time: 2022  Placenta removal: Spontaneous  Placenta appearance: Intact  Placenta disposition: discarded           Labor Events:       labor: Yes     Labor Onset Date/Time: 2022 18:10     Dilation Complete Date/Time: 2022 19:44     Start Pushing Date/Time: 2022 19:54       Start Pushing Date/Time: 2022 19:54     Rupture Date/Time: 22         Rupture type:            Fluid Amount:         Fluid Color: Clear        Fluid Odor:         Membrane Status: ARM (Artificial Rupture)                 steroids: None     Antibiotics given for GBS: No     Induction: misoprostol     Indications for induction:  Hypertension     Augmentation: oxytocin     Indications for augmentation: Hypertension     Labor complications: None     Additional complications:          Cervical ripening:          Misoprostol          Delivery:      Episiotomy: None     Indication for Episiotomy:       Perineal Lacerations: 2nd Repaired:      Periurethral Laceration:   Repaired:     Labial Laceration:   Repaired:     Sulcus Laceration:   Repaired:     Vaginal Laceration:   Repaired:     Cervical Laceration:   Repaired:     Repair suture:       Repair # of packets: 1     Last Value - EBL - Nursing (mL):       Sum - EBL - Nursing (mL): 0     Last Value - EBL - Anesthesia (mL):        Calculated QBL (mL):         Vaginal Sweep Performed: Yes      Surgicount Correct: Yes       Other providers:       Anesthesia    Method: Epidural          Details (if applicable):  Trial of Labor      Categorization:      Priority:     Indications for :     Incision Type:       Additional  information:  Forceps:    Vacuum:    Breech:    Observed anomalies    Other (Comments):

## 2022-01-08 VITALS
HEIGHT: 64 IN | SYSTOLIC BLOOD PRESSURE: 136 MMHG | BODY MASS INDEX: 32.1 KG/M2 | OXYGEN SATURATION: 99 % | TEMPERATURE: 97 F | WEIGHT: 188 LBS | HEART RATE: 81 BPM | DIASTOLIC BLOOD PRESSURE: 90 MMHG | RESPIRATION RATE: 16 BRPM

## 2022-01-08 PROCEDURE — 25000003 PHARM REV CODE 250: Performed by: OBSTETRICS & GYNECOLOGY

## 2022-01-08 PROCEDURE — 99238 HOSP IP/OBS DSCHRG MGMT 30/<: CPT | Mod: ,,, | Performed by: OBSTETRICS & GYNECOLOGY

## 2022-01-08 PROCEDURE — 99238 PR HOSPITAL DISCHARGE DAY,<30 MIN: ICD-10-PCS | Mod: ,,, | Performed by: OBSTETRICS & GYNECOLOGY

## 2022-01-08 RX ADMIN — DOCUSATE SODIUM 200 MG: 100 CAPSULE ORAL at 08:01

## 2022-01-08 RX ADMIN — PRENATAL VIT W/ FE FUMARATE-FA TAB 27-0.8 MG 1 TABLET: 27-0.8 TAB at 08:01

## 2022-01-08 RX ADMIN — IBUPROFEN 600 MG: 600 TABLET ORAL at 08:01

## 2022-01-08 RX ADMIN — IBUPROFEN 600 MG: 600 TABLET ORAL at 05:01

## 2022-01-08 NOTE — PLAN OF CARE
[place] - Mother Baby Unit  Discharge Assessment    Primary Care Provider: Vickie Chicas MD     OB Screen (most recent)       OB Screen - 22 0834          OB SCREEN    Assessment Type Discharge Planning Brief Assessment (P)      Source of Information health record (P)      Received Prenatal Care Yes (P)      Any indications/suspicions for None (P)      Is this a teen pregnancy No (P)      Is the baby in NICU No (P)      Indication for adoption/Safe Haven No (P)      Indication for DME/post-acute needs No (P)      HIV (+) No (P)      Any congenital  disorders No (P)      Fetal demise/ death No (P)                      This patient has been screened for Case Management needs.  Based on documentation in medical record, no needs are anticipated at this time. Case Management/Social Work remains available if a need arises, please enter consult for assistance.  For urgent needs contact Case Management Department/on-call at:  [phone].

## 2022-01-08 NOTE — DISCHARGE INSTRUCTIONS
Postpartum Discharge Instructions:    · No heavy lifting, straining, frequent rest periods  · Pelvic rest--no douching, tampons, or intercourse until released by MD  · Talk to your doctor about birth control--remember breastfeeding is not a method of birth control  · Notify MD if bleeding becomes heavier than usual and if large clots, painful cramping,or foul odor develops.   Vaginal discharge will lighten and decrease in amount gradually.    · Cleanse perineal area from front to back after urination or having a bowel movement.  · Tub soak or portable sitz bath at home.  Apply clean pad with Epifoam and Tucks to perineal area.  · Episiotomy stitches will dissolve within 2-3 weeks  · If not breastfeeding, wear tight fitting sports bra for 1 week--remove only to bathe  · Remember to keep your breast clean and dry to prevent any cracking  · Notify MD if breast become reddened,swollen, nipples bleed or crack, or fever greater than 100.4  · Notify MD of pain, swelling,  Redness, or heat developing in back of leg especially when foot is flexed toward body  · Look at incision everyday for redness, swelling, or drainage which may indicate infection  · Notify MD of pain not relieved by pain medication.  · Call MD or go to ER for any concerns    Patient Education       Vaginal Delivery Discharge Instructions   About this topic   A vaginal birth means that your baby was born through the vagina. How fast you get well will depend on many things. One is if you have had a surgical cut in the area between your vaginal opening and your anus. A cut in this area is an episiotomy. Sometimes you tear naturally during childbirth. This can also affect your healing time. How long your labor was and how much rest you get can also impact your healing. Most of the time, your body takes 6 to 8 weeks to heal from childbirth.     What care is needed at home?   · Ask your doctor what you need to do when you go home. Make sure you ask questions if  you do not understand what you need to do.  · Use a well-fitting bra for support.  · Use sanitary pads. Ask your doctor when you may use tampons or douche.  · Take a sitz bath 3 to 4 times each day. Sit in 2 to 3 inches (5 to 7.5 cm) of warm water in the tub for 10 to 15 minutes each time. Carefully wipe your bottom afterwards.  · Place an ice pack or a bag of frozen peas wrapped in a towel over the painful part. Never put ice right on the skin. Do not leave the ice on more than 10 to 15 minutes at a time.  · Take your drugs as ordered.  · If breastfeeding, wash your breasts daily with water to keep them clean. Air dry nipples after each feeding.  · You may feel sad, weepy, and overwhelmed. Your hormones are changing and you are sleeping less. This can affect your emotions. These feelings are normal. Be patient with yourself. If your sadness lasts more than 2 weeks, contact your doctor. Dont wait until your postpartum visit.  What follow-up care is needed?   Your doctor may ask you to make visits to the office to check on your progress. Be sure to keep your visits. Ask your doctor if it is okay to be active. Also ask about having sex and birth control.  What drugs may be needed?   Your doctor may give you a drug for pain. Over-the-counter (OTC) pain drugs often work just fine. Ask your doctor what to take if you are breastfeeding.  Will physical activity be limited?   You may have to limit your activity. Talk to your doctor about the right amount of activity for you. Ask your doctor when you may safely:  · Drive  · Have sex  · Lift heavy objects  · Climb stairs  · Work out  Do not lift older children. Let older children climb into your lap.  What problems could happen?   · Bleeding  · Uterine infection  · Tear of tissue around the vagina  · Blood clots in your legs  · Infection in your breasts  · Depression  When do I need to call the doctor?   · Signs of infection such as a fever of 100.4°F (38°C) or higher,  chills, pain with passing urine, or wound that will not heal.  · Sudden shortness of breath or a sudden onset of chest pain could be a sign that a blood clot has traveled to your lungs. Go to the ER right away.  · Signs of wound infection such as swelling, redness, warmth around the wound; too much pain when touched; yellowish, greenish, or bloody discharge; foul smell coming from the cut site; cut site opens up.  · Problems with pain that does not go away, or gets worse.  · Swollen, hard, or painful breasts with a fever of 100.4°F or higher.  · Feeling very sad or low mood.  · Problems with your urine or bowel movements.  · Sudden, large amounts of vaginal bleeding.  Helpful tips   · Take a nap when your baby sleeps.  · Do something that can help you relax like reading books or listening to music.  · Make time for you and your partner to be alone and talk.  · Make time for you and your partner to enjoy your baby.  · Breastfeeding is good for the baby and for you.  · Eat a well-balanced diet and drink plenty of fluids.  Teach Back: Helping You Understand   The Teach Back Method helps you understand the information we are giving you. After you talk with the staff, tell them in your own words what you learned. This helps to make sure the staff has described each thing clearly. It also helps to explain things that may have been confusing. Before going home, make sure you can do these:  · I can tell you about my procedure.  · I can tell you what may help ease my pain.  · I can tell you what I will do if I have large amounts of vaginal bleeding, swollen or painful breasts with a fever, or feel very sad or have a low mood.  Where can I learn more?   KidsHealth  https://kidshealth.org/en/parents/recovering-delivery.html   [place] Department of Health and Human Services Office on Women's Health  http://womenshealth.gov/pregnancy/childbirth-beyond/recovering-from-birth.html   Last Reviewed Date   2020-10-13  Consumer Information  Use and Disclaimer   This information is not specific medical advice and does not replace information you receive from your health care provider. This is only a brief summary of general information. It does NOT include all information about conditions, illnesses, injuries, tests, procedures, treatments, therapies, discharge instructions or life-style choices that may apply to you. You must talk with your health care provider for complete information about your health and treatment options. This information should not be used to decide whether or not to accept your health care providers advice, instructions or recommendations. Only your health care provider has the knowledge and training to provide advice that is right for you.  Copyright   Copyright © 2021 [employer], [employer]. and its affiliates and/or licensors. All rights reserved.    Patient Education       Postpartum Depression Discharge Instructions   About this topic   Postpartum depression is feeling very sad, hopeless, or down after having a baby. You may hear the feelings called PPD. PPD most often happens 2 to 4 weeks after giving birth. PPD may also cause you to not care about or enjoy doing things that you used to like to do. PPD is not the same as the baby blues. With PPD, the mother has mood swings and cries for no clear reason. Baby blues often go away after a few weeks. Care for PPD may include drugs and counseling.     What care is needed at home?   · Ask your doctor what you need to do when you go home. Make sure you ask questions if you do not understand what you need to do.  · Get lots of rest. Try to sleep when your baby sleeps.  · Have a regular sleep pattern where you get 6 to 8 hours of sleep each night.  · Ask your family and friends for help. Ask your partner to help with feedings and changing diapers at night.  · Write down your emotions each day. Doing this will help you express your thoughts and feelings.  · Try to spend time outside each  day. Sunshine may make you feel better.  · Find ways to relax each day and lower stress. Take long baths or go for a walk.  · Share your feelings and worries with your partner.  · Have some quality time with your partner.  · Do not drink beer, wine, and mixed drinks (alcohol) and caffeine.  What follow-up care is needed?   · Your doctor may ask you to make visits to the office to check on your progress. Be sure to keep your visits.  · Your doctor may send you to a counselor to help you understand and cope with your condition.  What drugs may be needed?   The doctor may order drugs to:  · Help with low mood  · Help with anxiety  · Help you to sleep better  · Even out hormones  Will physical activity be limited?   Physical activity like sports and exercise may help you get better. Talk with your doctor about the right amount of activity for you.  What changes to diet are needed?   Eating a healthy diet is important during this time. A healthy diet means:  · Eat whole grain foods and foods high in fiber.  · Choose many different fruits and vegetables. Fresh or frozen is best.  · Cut back on solid fats like butter or margarine. Eat less fatty or processed foods.  · Try to eat more low fat or lean meats, chicken, and turkey. Eat less red meat and eat more fish and beans instead.  If you need help, ask to see a dietitian.  What can be done to prevent this health problem?   · Learn how to cope with stress. Try guided imagery, yoga, eduardo chi, or deep breathing.  · Learn about depression and the signs that go with depression. Then, you can get help early.  · Join a support group. Learn how others have coped with PPD.  When do I need to call the doctor?   · If you have any thoughts of hurting yourself, someone else, or your baby  · If your depression gets worse or is not getting better  · If your problems with eating or sleeping continue  Teach Back: Helping You Understand   The Teach Back Method helps you understand the  information we are giving you. After you talk with the staff, tell them in your own words what you learned. This helps to make sure the staff has described each thing clearly. It also helps to explain things that may have been confusing. Before going home, make sure you can do these:e:  · I can tell you about my condition.  · I can tell you what may help me relax and lower my stress.  · I can tell you what I will do if I think about hurting myself or someone else or if my depression is not getting better.  Where can I learn more?   FamilyDoctor.org  http://familydoctor.org/familydoctor/en/diseases-conditions/postpartum-depression.printerview.all.html   Kids Health  https://kidshealth.org/en/parents/ppd.html?ref=search&WT.ac=wlb-d-iehg-my-rsiuvz-rhf   NHS Choices  https://www.nhs.uk/conditions/post--depression/   Last Reviewed Date   2020  Consumer Information Use and Disclaimer   This information is not specific medical advice and does not replace information you receive from your health care provider. This is only a brief summary of general information. It does NOT include all information about conditions, illnesses, injuries, tests, procedures, treatments, therapies, discharge instructions or life-style choices that may apply to you. You must talk with your health care provider for complete information about your health and treatment options. This information should not be used to decide whether or not to accept your health care providers advice, instructions or recommendations. Only your health care provider has the knowledge and training to provide advice that is right for you.  Copyright   Copyright ©  UpToDate, [employer]. and its affiliates and/or licensors. All rights reserved.

## 2022-01-08 NOTE — PLAN OF CARE
Stable shift. Pt tolerated all procedures well. V/S stable. NAD noted. See flow sheets for details. Pt up to bathroom w/o assistance needed. Pain management plan reviewed w/ pt; pt states understanding and refused need for meds during shift. Pt attentive and appropriate w/ infant during shift. Encouragement needed w/ BF. Pt hopes to be discharged home, at 24hr,  w/ infant and family. Plan of care reviewed w/ pt; pt states understanding.   Reinforced benefits of skin to skin at birth and throughout hospital stay.  Questions/ Concerns answered, Mother verbalizes understanding.    Used Breastfeeding Guide and reviewed first alert form, importance/ benefits of exclusive breastfeeding for 6 months, proper handling and storage of breast milk, and all resources available after leaving the hospital. Questions/ Concerns answered. Mother verbalized understanding.

## 2022-01-08 NOTE — LACTATION NOTE
This note was copied from a baby's chart.  Mother encouraged to feed frequently throughout shift due to mildly elevated bilirubin level and will recheck bilirubin in AM. Educated mother on hand expression and feeding extra to infant to help with jaundice. other verbalized understanding. Mother hand expressed left breast for a few minutes then spoon fed 5 drops to infant. Mother latched infant. Infant noted shallow latch and making smacking noises. Latch broken and educated to mother not to let infant suck nipple into mouth and make sure has wide gape. Infant latched again with nurse assist without difficulty. Bottom lip noted not flanged therefore assisted with flanging lip out. Stimulation required throughout feeding. Reinforced Breastfeeding Guide and reviewed first alert form, importance/ benefits of exclusive breastfeeding for 6 months, proper handling and storage of breast milk, and all resources available after leaving the hospital. Questions/ Concerns answered. Mother verbalized understanding.

## 2022-01-08 NOTE — DISCHARGE SUMMARY
[facility] Mother Baby Unit  Obstetrics  Discharge Summary      Patient Name: Carline Iqbal  MRN: [id number]  Admission Date: 2022  Hospital Length of Stay: 3 days  Discharge Date and Time:  2022 9:45 AM  Attending Physician: Arline Bartholomew*   Discharging Provider: Roseanne Rodriguez MD   Primary Care Provider: Vickie Chicas MD    HPI: Patient is a 27 yo  at 37w4d presenting for induction of labor for gestational hypertension. She denies headache, chest pain or right upper quadrant pain. She is having some shortness of birth, but this is not new with her asthma.           * No surgery found *     Hospital Course:   HD#1 Admitted for IOL secondary to elevated BPs  HD#2 Uncomplicated   HD#3 Routine PP care; monitor BPs  HD#4 Discharge           Final Active Diagnoses:    Diagnosis Date Noted POA    PRINCIPAL PROBLEM:   (normal spontaneous vaginal delivery) [O80] 2022 Not Applicable    Gestational hypertension, third trimester [O13.3] 2022 Yes    Asthma affecting pregnancy in third trimester [O99.513, J45.909] 2022 Yes      Problems Resolved During this Admission:        Significant Diagnostic Studies: Labs:   CBC   Recent Labs   Lab 22  0546   WBC 15.28*   HGB 9.6*   HCT 30.6*            Feeding Method: breast    Immunizations     Date Immunization Status Dose Route/Site Given by    22 MMR Incomplete 0.5 mL Subcutaneous/     22 Tdap Incomplete 0.5 mL Intramuscular/           Delivery:    Episiotomy: None   Lacerations: 2nd   Repair suture:     Repair # of packets: 1   Blood loss (ml):       Birth information:  YOB: 2023   Time of birth: 9:00 PM   Sex: female   Delivery type: Vaginal, Spontaneous   Gestational Age: 37w5d    Delivery Clinician:      Other providers:       Additional  information:  Forceps:    Vacuum:    Breech:    Observed anomalies      Living?:           APGARS  One minute Five minutes  Ten minutes   Skin color:         Heart rate:         Grimace:         Muscle tone:         Breathing:         Totals: 9  9        Placenta: Delivered:       appearance    Pending Diagnostic Studies:     None          Discharged Condition: good    Disposition: Home or Self Care    Follow Up:   Follow-up Information     Arline Bartholomew MD In 1 week.    Specialty: Obstetrics and Gynecology  [facility] information[facility]  [facility] [address]  [phone]                       Patient Instructions:      Comprehensive Metabolic Panel   Standing Status: Future Standing Exp. Date: 03/06/23     Protein/Creatinine Ratio, Urine   Standing Status: Future Standing Exp. Date: 03/06/23     Order Specific Question Answer Comments   Specimen Source Urine      Diet Adult Regular     Lifting restrictions     Pelvic Rest     No dressing needed     Notify your health care provider if you experience any of the following:  temperature >100.4     Notify your health care provider if you experience any of the following:  persistent nausea and vomiting or diarrhea     Notify your health care provider if you experience any of the following:  severe uncontrolled pain     Notify your health care provider if you experience any of the following:  redness, tenderness, or signs of infection (pain, swelling, redness, odor or green/yellow discharge around incision site)     Notify your health care provider if you experience any of the following:  difficulty breathing or increased cough     Medications:  Current Discharge Medication List      CONTINUE these medications which have NOT CHANGED    Details   albuterol (PROVENTIL) 2.5 mg /3 mL (0.083 %) nebulizer solution Take 3 mLs (2.5 mg total) by nebulization every 6 (six) hours as needed for Wheezing.  Qty: 30 each, Refills: 0      benzonatate (TESSALON) 100 MG capsule Take 2 capsules (200 mg total) by mouth 3 (three) times daily as needed for Cough.  Qty: 20 capsule, Refills: 0       famotidine (PEPCID) 20 MG tablet Take 1 tablet (20 mg total) by mouth 2 (two) times daily.  Qty: 60 tablet, Refills: 11      PRENATAL MULTI-DHA,WITH VIT K, 27 mg iron-800 mcg-260 mg Cap TAKE 1 CAPSULE BY MOUTH ONCE DAILY  Qty: 30 capsule, Refills: 12    Associated Diagnoses: Amenorrhea      mupirocin (BACTROBAN) 2 % ointment Apply topically 2 (two) times daily.  Qty: 30 g, Refills: 0    Associated Diagnoses: Abscess of left groin      triamcinolone acetonide 0.1% (KENALOG) 0.1 % ointment Apply topically 2 (two) times daily. for 10 days  Qty: 30 g, Refills: 0    Associated Diagnoses: Flexural eczema             Roseanne Rodriguez MD  Obstetrics  [facility] - [facility] Baby Unit

## 2022-01-08 NOTE — PLAN OF CARE
"  Stable condition. VS WNL. Lungs clear. Pain is being managed with oral medications. Self camilla-care and reports light vaginal bleeding. Fundus firm without massage. Voiding without difficulty. 2nd degree perineal laceration, using tucks and dermaplast for comfort. Pt also sitting on "donut" ring to help with perineum pressure. Tolerating regular diet and drinking adequate amounts of fluids. Bonding appropriately with .  Significant other at bedside.     LACTATION NOTE: Assistance with positioning baby to R side. Pt c/o of nipple pain and difficulty latching on this breast. Utilized football position. Baby did latch and nursed for 20 minutes. Pt said it felt better, but has some discomfort still. LATCH score completed. Lanolin and hydrogels at bedside.     Dsicharge instructions given. F/U with Dr. Bartholomew on  @ 11:45am. Voiced. Understanding. Received a copy of discharge instructions including Save Your Life and Preeclampsia handouts.   "

## 2022-01-12 ENCOUNTER — POSTPARTUM VISIT (OUTPATIENT)
Dept: OBSTETRICS AND GYNECOLOGY | Facility: CLINIC | Age: 27
End: 2022-01-12
Payer: COMMERCIAL

## 2022-01-12 VITALS
WEIGHT: 175 LBS | RESPIRATION RATE: 12 BRPM | OXYGEN SATURATION: 100 % | DIASTOLIC BLOOD PRESSURE: 72 MMHG | HEART RATE: 86 BPM | HEIGHT: 64 IN | BODY MASS INDEX: 29.88 KG/M2 | SYSTOLIC BLOOD PRESSURE: 118 MMHG

## 2022-01-12 PROCEDURE — 0503F POSTPARTUM CARE VISIT: CPT | Mod: CPTII,S$GLB,, | Performed by: STUDENT IN AN ORGANIZED HEALTH CARE EDUCATION/TRAINING PROGRAM

## 2022-01-12 PROCEDURE — 99999 PR PBB SHADOW E&M-EST. PATIENT-LVL III: CPT | Mod: PBBFAC,,, | Performed by: STUDENT IN AN ORGANIZED HEALTH CARE EDUCATION/TRAINING PROGRAM

## 2022-01-12 PROCEDURE — 0503F PR POSTPARTUM CARE VISIT: ICD-10-PCS | Mod: CPTII,S$GLB,, | Performed by: STUDENT IN AN ORGANIZED HEALTH CARE EDUCATION/TRAINING PROGRAM

## 2022-01-12 PROCEDURE — 99999 PR PBB SHADOW E&M-EST. PATIENT-LVL III: ICD-10-PCS | Mod: PBBFAC,,, | Performed by: STUDENT IN AN ORGANIZED HEALTH CARE EDUCATION/TRAINING PROGRAM

## 2022-01-12 NOTE — PROGRESS NOTES
CC: Post-partum follow-up    Carline Iqbal is a 26 y.o. female  presents for a postpartum visit.  She is status post   1 weeks ago.  Her hospitalization was not complicated.  She is breastfeeding.  She desires no method for contraception.  She denies postpartum depression. She and the baby are doing well.  No pain.  No fever.   No bowel / bladder complaints. Pregnancy was complicated by: gestational diabetes    Delivery Date: 2022  Delivery MD: Joe Rodriguez MD  Gender: female  Birth Weight: 6 pounds 13 ounces  Breast Feeding: YES  Depression: NO  Contraception: undecided      Her last pap was 3/12/2021      ROS:  GENERAL: No fever, chills, fatigability.  VULVAR: No pain, no lesions and no itching.  VAGINAL: No relaxation, no itching, no discharge, no abnormal bleeding and no lesions.  ABDOMEN: No abdominal pain. Denies nausea. Denies vomiting. No diarrhea. No constipation  BREAST: Denies pain. No lumps. No discharge.  URINARY: No incontinence, no nocturia, no frequency and no dysuria.  CARDIOVASCULAR: No chest pain. No shortness of breath. No leg cramps.  NEUROLOGICAL: No headaches. No vision changes.    Past Medical History:   Diagnosis Date    Asthma     Eczema     Pilonidal cyst 2016     Past Surgical History:   Procedure Laterality Date    pilonidal cyst removal      TYMPANOSTOMY TUBE PLACEMENT Bilateral      Review of patient's allergies indicates:  No Known Allergies    Current Outpatient Medications:     albuterol (PROVENTIL) 2.5 mg /3 mL (0.083 %) nebulizer solution, Take 3 mLs (2.5 mg total) by nebulization every 6 (six) hours as needed for Wheezing., Disp: 30 each, Rfl: 0    famotidine (PEPCID) 20 MG tablet, Take 1 tablet (20 mg total) by mouth 2 (two) times daily., Disp: 60 tablet, Rfl: 11    mupirocin (BACTROBAN) 2 % ointment, Apply topically 2 (two) times daily., Disp: 30 g, Rfl: 0    PRENATAL MULTI-DHA,WITH VIT K, 27 mg iron-800 mcg-260 mg Cap, TAKE 1  CAPSULE BY MOUTH ONCE DAILY, Disp: 30 capsule, Rfl: 12    triamcinolone acetonide 0.1% (KENALOG) 0.1 % ointment, Apply topically 2 (two) times daily. for 10 days, Disp: 30 g, Rfl: 0      Vitals:    22 1156   BP: 118/72   Pulse: 86   Resp: 12         EXTERNAL GENITALIA POSTPARTUM: stitches still in place, no surrounding erythema   VAGINA POSTPARTUM: bloody discharge    Assessment:    1. Normal postpartum exam  2. Doing well S/P       Plan:    1. Routine follow up.  2. Instructions / precautions reviewed  3. Contraceptive counseling  4. May resume normal activities  5. Return: 5 weeks

## 2022-01-14 ENCOUNTER — PATIENT MESSAGE (OUTPATIENT)
Dept: OBSTETRICS AND GYNECOLOGY | Facility: HOSPITAL | Age: 27
End: 2022-01-14
Payer: COMMERCIAL

## 2022-01-27 ENCOUNTER — PATIENT MESSAGE (OUTPATIENT)
Dept: OBSTETRICS AND GYNECOLOGY | Facility: HOSPITAL | Age: 27
End: 2022-01-27
Payer: COMMERCIAL

## 2022-02-16 ENCOUNTER — POSTPARTUM VISIT (OUTPATIENT)
Dept: OBSTETRICS AND GYNECOLOGY | Facility: CLINIC | Age: 27
End: 2022-02-16
Payer: COMMERCIAL

## 2022-02-16 VITALS
BODY MASS INDEX: 28.39 KG/M2 | RESPIRATION RATE: 12 BRPM | HEART RATE: 86 BPM | OXYGEN SATURATION: 100 % | SYSTOLIC BLOOD PRESSURE: 114 MMHG | DIASTOLIC BLOOD PRESSURE: 72 MMHG | HEIGHT: 64 IN | WEIGHT: 166.31 LBS

## 2022-02-16 PROCEDURE — 99999 PR PBB SHADOW E&M-EST. PATIENT-LVL III: CPT | Mod: PBBFAC,,, | Performed by: STUDENT IN AN ORGANIZED HEALTH CARE EDUCATION/TRAINING PROGRAM

## 2022-02-16 PROCEDURE — 99499 UNLISTED E&M SERVICE: CPT | Mod: S$GLB,,, | Performed by: STUDENT IN AN ORGANIZED HEALTH CARE EDUCATION/TRAINING PROGRAM

## 2022-02-16 PROCEDURE — 99999 PR PBB SHADOW E&M-EST. PATIENT-LVL III: ICD-10-PCS | Mod: PBBFAC,,, | Performed by: STUDENT IN AN ORGANIZED HEALTH CARE EDUCATION/TRAINING PROGRAM

## 2022-02-16 PROCEDURE — 99499 NO LOS: ICD-10-PCS | Mod: S$GLB,,, | Performed by: STUDENT IN AN ORGANIZED HEALTH CARE EDUCATION/TRAINING PROGRAM

## 2022-02-16 NOTE — PROGRESS NOTES
Subjective:       Patient ID: Carline Iqbal is a 26 y.o. female.    Chief Complaint:  Postpartum Care      History of Present Illness  Patient is a 25 yo  presenting for 6 week postpartum visit. She reports that bleeding recently stopped. She denies postpartum depression. She is unsure about birth control. Baby is doing well and is still breastfeeding. Pregnancy complicated by asthma and Pre-eclampsia with severe features.         GYN & OB History  Patient's last menstrual period was 2021.   Date of Last Pap: 3/12/2021    OB History    Para Term  AB Living   1 1 1 0 0 1   SAB IAB Ectopic Multiple Live Births   0 0 0 0 1      # Outcome Date GA Lbr Jeffrey/2nd Weight Sex Delivery Anes PTL Lv   1 Term 22 37w5d 25:34 / 01:16 3.09 kg (6 lb 13 oz) F Vag-Spont EPI Y IWONA       Review of Systems  Review of Systems   Constitutional: Negative for chills, fatigue, fever and unexpected weight change.   HENT: Negative for congestion, sinus pain and sore throat.    Eyes: Negative for visual disturbance.   Respiratory: Negative for cough, chest tightness and shortness of breath.    Cardiovascular: Negative for chest pain and palpitations.   Gastrointestinal: Negative for abdominal pain, constipation, diarrhea, nausea and vomiting.   Genitourinary: Negative for vaginal bleeding, vaginal discharge and vaginal pain.   Musculoskeletal: Negative for myalgias.   Skin: Negative for color change, pallor and rash.   Neurological: Negative for dizziness, light-headedness and headaches.   Psychiatric/Behavioral: The patient is not nervous/anxious.            Objective:    Physical Exam:   Constitutional: She is oriented to person, place, and time. She appears well-developed and well-nourished. No distress.    HENT:   Head: Normocephalic and atraumatic.    Eyes: Pupils are equal, round, and reactive to light. EOM are normal.      Pulmonary/Chest: Effort normal.        Abdominal: Soft. She exhibits no  distension.     Genitourinary:          Pelvic exam was performed with patient supine.   The external female genitalia was normal.                 Neurological: She is alert and oriented to person, place, and time.    Skin: Skin is warm and dry. No erythema.    Psychiatric: She has a normal mood and affect. Her behavior is normal. Judgment and thought content normal.          Assessment:        1. Postpartum care and examination of lactating mother               Plan:      Carline was seen today for postpartum care.    Diagnoses and all orders for this visit:    Postpartum care and examination of lactating mother

## 2022-03-02 ENCOUNTER — PATIENT MESSAGE (OUTPATIENT)
Dept: OBSTETRICS AND GYNECOLOGY | Facility: CLINIC | Age: 27
End: 2022-03-02
Payer: COMMERCIAL

## 2022-04-11 PROBLEM — O13.3 GESTATIONAL HYPERTENSION, THIRD TRIMESTER: Status: RESOLVED | Noted: 2022-01-05 | Resolved: 2022-04-11

## 2022-04-21 ENCOUNTER — HOSPITAL ENCOUNTER (OUTPATIENT)
Dept: RADIOLOGY | Facility: HOSPITAL | Age: 27
Discharge: HOME | End: 2022-04-21
Attending: PHYSICIAN ASSISTANT
Payer: MEDICAID

## 2022-04-21 ENCOUNTER — OFFICE VISIT (OUTPATIENT)
Dept: ORTHOPEDICS | Facility: CLINIC | Age: 27
End: 2022-04-21
Payer: COMMERCIAL

## 2022-04-21 VITALS
SYSTOLIC BLOOD PRESSURE: 114 MMHG | DIASTOLIC BLOOD PRESSURE: 60 MMHG | BODY MASS INDEX: 27.67 KG/M2 | HEART RATE: 84 BPM | RESPIRATION RATE: 18 BRPM | WEIGHT: 162.06 LBS | HEIGHT: 64 IN

## 2022-04-21 DIAGNOSIS — M25.512 LEFT SHOULDER PAIN, UNSPECIFIED CHRONICITY: Primary | ICD-10-CM

## 2022-04-21 DIAGNOSIS — M25.512 LEFT SHOULDER PAIN, UNSPECIFIED CHRONICITY: ICD-10-CM

## 2022-04-21 DIAGNOSIS — M25.519 SHOULDER PAIN, UNSPECIFIED CHRONICITY, UNSPECIFIED LATERALITY: Primary | ICD-10-CM

## 2022-04-21 DIAGNOSIS — M25.512 ACUTE PAIN OF LEFT SHOULDER: Primary | ICD-10-CM

## 2022-04-21 PROCEDURE — 99203 PR OFFICE/OUTPT VISIT, NEW, LEVL III, 30-44 MIN: ICD-10-PCS | Mod: S$GLB,,, | Performed by: PHYSICIAN ASSISTANT

## 2022-04-21 PROCEDURE — 3074F SYST BP LT 130 MM HG: CPT | Mod: CPTII,S$GLB,, | Performed by: PHYSICIAN ASSISTANT

## 2022-04-21 PROCEDURE — 3008F BODY MASS INDEX DOCD: CPT | Mod: CPTII,S$GLB,, | Performed by: PHYSICIAN ASSISTANT

## 2022-04-21 PROCEDURE — 3078F PR MOST RECENT DIASTOLIC BLOOD PRESSURE < 80 MM HG: ICD-10-PCS | Mod: CPTII,S$GLB,, | Performed by: PHYSICIAN ASSISTANT

## 2022-04-21 PROCEDURE — 3008F PR BODY MASS INDEX (BMI) DOCUMENTED: ICD-10-PCS | Mod: CPTII,S$GLB,, | Performed by: PHYSICIAN ASSISTANT

## 2022-04-21 PROCEDURE — 1159F MED LIST DOCD IN RCRD: CPT | Mod: CPTII,S$GLB,, | Performed by: PHYSICIAN ASSISTANT

## 2022-04-21 PROCEDURE — 99203 OFFICE O/P NEW LOW 30 MIN: CPT | Mod: S$GLB,,, | Performed by: PHYSICIAN ASSISTANT

## 2022-04-21 PROCEDURE — 3074F PR MOST RECENT SYSTOLIC BLOOD PRESSURE < 130 MM HG: ICD-10-PCS | Mod: CPTII,S$GLB,, | Performed by: PHYSICIAN ASSISTANT

## 2022-04-21 PROCEDURE — 99213 OFFICE O/P EST LOW 20 MIN: CPT | Mod: PBBFAC | Performed by: PHYSICIAN ASSISTANT

## 2022-04-21 PROCEDURE — 99999 PR PBB SHADOW E&M-EST. PATIENT-LVL III: ICD-10-PCS | Mod: PBBFAC,,, | Performed by: PHYSICIAN ASSISTANT

## 2022-04-21 PROCEDURE — 73030 X-RAY EXAM OF SHOULDER: CPT | Mod: TC,LT

## 2022-04-21 PROCEDURE — 1160F PR REVIEW ALL MEDS BY PRESCRIBER/CLIN PHARMACIST DOCUMENTED: ICD-10-PCS | Mod: CPTII,S$GLB,, | Performed by: PHYSICIAN ASSISTANT

## 2022-04-21 PROCEDURE — 73030 X-RAY EXAM OF SHOULDER: CPT | Mod: 26,LT,, | Performed by: RADIOLOGY

## 2022-04-21 PROCEDURE — 99999 PR PBB SHADOW E&M-EST. PATIENT-LVL III: CPT | Mod: PBBFAC,,, | Performed by: PHYSICIAN ASSISTANT

## 2022-04-21 PROCEDURE — 73030 XR SHOULDER COMPLETE 2 OR MORE VIEWS LEFT: ICD-10-PCS | Mod: 26,LT,, | Performed by: RADIOLOGY

## 2022-04-21 PROCEDURE — 1159F PR MEDICATION LIST DOCUMENTED IN MEDICAL RECORD: ICD-10-PCS | Mod: CPTII,S$GLB,, | Performed by: PHYSICIAN ASSISTANT

## 2022-04-21 PROCEDURE — 3078F DIAST BP <80 MM HG: CPT | Mod: CPTII,S$GLB,, | Performed by: PHYSICIAN ASSISTANT

## 2022-04-21 PROCEDURE — 1160F RVW MEDS BY RX/DR IN RCRD: CPT | Mod: CPTII,S$GLB,, | Performed by: PHYSICIAN ASSISTANT
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
## 2022-05-03 ENCOUNTER — PATIENT OUTREACH (OUTPATIENT)
Dept: ADMINISTRATIVE | Facility: OTHER | Age: 27
End: 2022-05-03
Payer: COMMERCIAL

## 2022-05-03 ENCOUNTER — OFFICE VISIT (OUTPATIENT)
Dept: OBSTETRICS AND GYNECOLOGY | Facility: CLINIC | Age: 27
End: 2022-05-03
Attending: FAMILY MEDICINE
Payer: COMMERCIAL

## 2022-05-03 VITALS
RESPIRATION RATE: 12 BRPM | WEIGHT: 161.13 LBS | SYSTOLIC BLOOD PRESSURE: 110 MMHG | DIASTOLIC BLOOD PRESSURE: 68 MMHG | BODY MASS INDEX: 27.51 KG/M2 | OXYGEN SATURATION: 100 % | HEART RATE: 72 BPM | HEIGHT: 64 IN

## 2022-05-03 DIAGNOSIS — N89.8 VAGINAL ITCHING: Primary | ICD-10-CM

## 2022-05-03 DIAGNOSIS — N76.4 LABIAL ABSCESS: ICD-10-CM

## 2022-05-03 PROCEDURE — 99212 PR OFFICE/OUTPT VISIT, EST, LEVL II, 10-19 MIN: ICD-10-PCS | Mod: S$PBB,,, | Performed by: STUDENT IN AN ORGANIZED HEALTH CARE EDUCATION/TRAINING PROGRAM

## 2022-05-03 PROCEDURE — 3008F BODY MASS INDEX DOCD: CPT | Mod: CPTII,,, | Performed by: STUDENT IN AN ORGANIZED HEALTH CARE EDUCATION/TRAINING PROGRAM

## 2022-05-03 PROCEDURE — 3078F PR MOST RECENT DIASTOLIC BLOOD PRESSURE < 80 MM HG: ICD-10-PCS | Mod: CPTII,,, | Performed by: STUDENT IN AN ORGANIZED HEALTH CARE EDUCATION/TRAINING PROGRAM

## 2022-05-03 PROCEDURE — 1159F PR MEDICATION LIST DOCUMENTED IN MEDICAL RECORD: ICD-10-PCS | Mod: CPTII,,, | Performed by: STUDENT IN AN ORGANIZED HEALTH CARE EDUCATION/TRAINING PROGRAM

## 2022-05-03 PROCEDURE — 99999 PR PBB SHADOW E&M-EST. PATIENT-LVL III: ICD-10-PCS | Mod: PBBFAC,,, | Performed by: STUDENT IN AN ORGANIZED HEALTH CARE EDUCATION/TRAINING PROGRAM

## 2022-05-03 PROCEDURE — 87801 DETECT AGNT MULT DNA AMPLI: CPT | Performed by: STUDENT IN AN ORGANIZED HEALTH CARE EDUCATION/TRAINING PROGRAM

## 2022-05-03 PROCEDURE — 3074F SYST BP LT 130 MM HG: CPT | Mod: CPTII,,, | Performed by: STUDENT IN AN ORGANIZED HEALTH CARE EDUCATION/TRAINING PROGRAM

## 2022-05-03 PROCEDURE — 3078F DIAST BP <80 MM HG: CPT | Mod: CPTII,,, | Performed by: STUDENT IN AN ORGANIZED HEALTH CARE EDUCATION/TRAINING PROGRAM

## 2022-05-03 PROCEDURE — 99213 OFFICE O/P EST LOW 20 MIN: CPT | Mod: PBBFAC | Performed by: STUDENT IN AN ORGANIZED HEALTH CARE EDUCATION/TRAINING PROGRAM

## 2022-05-03 PROCEDURE — 99999 PR PBB SHADOW E&M-EST. PATIENT-LVL III: CPT | Mod: PBBFAC,,, | Performed by: STUDENT IN AN ORGANIZED HEALTH CARE EDUCATION/TRAINING PROGRAM

## 2022-05-03 PROCEDURE — 3008F PR BODY MASS INDEX (BMI) DOCUMENTED: ICD-10-PCS | Mod: CPTII,,, | Performed by: STUDENT IN AN ORGANIZED HEALTH CARE EDUCATION/TRAINING PROGRAM

## 2022-05-03 PROCEDURE — 3074F PR MOST RECENT SYSTOLIC BLOOD PRESSURE < 130 MM HG: ICD-10-PCS | Mod: CPTII,,, | Performed by: STUDENT IN AN ORGANIZED HEALTH CARE EDUCATION/TRAINING PROGRAM

## 2022-05-03 PROCEDURE — 87481 CANDIDA DNA AMP PROBE: CPT | Mod: 59 | Performed by: STUDENT IN AN ORGANIZED HEALTH CARE EDUCATION/TRAINING PROGRAM

## 2022-05-03 PROCEDURE — 99212 OFFICE O/P EST SF 10 MIN: CPT | Mod: S$PBB,,, | Performed by: STUDENT IN AN ORGANIZED HEALTH CARE EDUCATION/TRAINING PROGRAM

## 2022-05-03 PROCEDURE — 1159F MED LIST DOCD IN RCRD: CPT | Mod: CPTII,,, | Performed by: STUDENT IN AN ORGANIZED HEALTH CARE EDUCATION/TRAINING PROGRAM

## 2022-05-03 RX ORDER — CLINDAMYCIN PHOSPHATE 20 MG/G
CREAM VAGINAL 2 TIMES DAILY
Qty: 40 G | Refills: 0 | Status: SHIPPED | OUTPATIENT
Start: 2022-05-03 | End: 2022-05-03 | Stop reason: SDUPTHER

## 2022-05-03 RX ORDER — CLINDAMYCIN PHOSPHATE 20 MG/G
CREAM VAGINAL 2 TIMES DAILY
Qty: 40 G | Refills: 0 | Status: SHIPPED | OUTPATIENT
Start: 2022-05-03 | End: 2022-05-10

## 2022-05-03 NOTE — PROGRESS NOTES
Health Maintenance Due   Topic Date Due    COVID-19 Vaccine (1) Never done    TETANUS VACCINE  07/22/2021     Updates were requested from care everywhere.  Chart was reviewed for overdue Proactive [facility] Encounters (MICHELINE) topics (CRS, Breast Cancer Screening, Eye exam)  Health Maintenance has been updated.  LINKS immunization registry triggered.  Immunizations were reconciled.

## 2022-05-03 NOTE — PROGRESS NOTES
Subjective:       Patient ID: Carline Iqbal is a 27 y.o. female.    Chief Complaint:  Cyst      History of Present Illness  Patient is a 28 yo  presenting with labial abscess and vaginal itching.     She reports that the vaginal itching has been present for ~ 4 months with white discharge. She has tried Vagisil anti-itch cream with some relief.     She has a history of labial abscesses with the most recent being present for ~ 1 month.         GYN & OB History  No LMP recorded.   Date of Last Pap: 3/12/2021    OB History    Para Term  AB Living   1 1 1 0 0 1   SAB IAB Ectopic Multiple Live Births   0 0 0 0 1      # Outcome Date GA Lbr Jeffrey/2nd Weight Sex Delivery Anes PTL Lv   1 Term 22 37w5d 25:34 / 01:16 3.09 kg (6 lb 13 oz) F Vag-Spont EPI Y IWONA       Review of Systems  Review of Systems   Constitutional: Negative for chills, fatigue, fever and unexpected weight change.   HENT: Negative for congestion, sinus pain and sore throat.    Eyes: Negative for visual disturbance.   Respiratory: Negative for cough, chest tightness and shortness of breath.    Cardiovascular: Negative for chest pain and palpitations.   Gastrointestinal: Negative for abdominal pain, constipation, diarrhea, nausea and vomiting.   Genitourinary: Positive for vaginal discharge. Negative for menstrual problem, vaginal bleeding and vaginal pain.        Labial abscess     Musculoskeletal: Negative for myalgias.   Skin: Negative for color change, pallor and rash.   Neurological: Negative for dizziness, light-headedness and headaches.   Psychiatric/Behavioral: The patient is not nervous/anxious.            Objective:    Physical Exam:   Constitutional: She is oriented to person, place, and time. She appears well-developed and well-nourished. No distress.    HENT:   Head: Normocephalic and atraumatic.    Eyes: Pupils are equal, round, and reactive to light. EOM are normal.     Cardiovascular: Normal rate.      Pulmonary/Chest: Effort normal. No respiratory distress.        Abdominal: Soft. There is no abdominal tenderness.     Genitourinary:          Pelvic exam was performed with patient supine.   The external female genitalia was normal.   There is no rash, tenderness or lesion on the right labia. There is lesion on the left labia. There is no rash or tenderness on the left labia.           Musculoskeletal: Normal range of motion and moves all extremeties.       Neurological: She is alert and oriented to person, place, and time.    Skin: Skin is warm and dry.    Psychiatric: She has a normal mood and affect. Her behavior is normal. Thought content normal.          Assessment:        1. Vaginal itching    2. Labial abscess               Plan:      Carline was seen today for cyst.    Diagnoses and all orders for this visit:    Vaginal itching  -     Vaginosis Screen by DNA Probe    Labial abscess  -     clindamycin (CLINDESSE) 2 % vaginal cream; Place vaginally 2 (two) times a day. for 7 days

## 2022-05-07 LAB
BACTERIAL VAGINOSIS DNA: NEGATIVE
CANDIDA GLABRATA DNA: NEGATIVE
CANDIDA KRUSEI DNA: NEGATIVE
CANDIDA RRNA VAG QL PROBE: NEGATIVE
T VAGINALIS RRNA GENITAL QL PROBE: NEGATIVE

## 2022-05-26 ENCOUNTER — OFFICE VISIT (OUTPATIENT)
Dept: ORTHOPEDICS | Facility: CLINIC | Age: 27
End: 2022-05-26
Payer: COMMERCIAL

## 2022-05-26 VITALS
RESPIRATION RATE: 18 BRPM | HEIGHT: 64 IN | SYSTOLIC BLOOD PRESSURE: 110 MMHG | DIASTOLIC BLOOD PRESSURE: 68 MMHG | BODY MASS INDEX: 27.59 KG/M2 | HEART RATE: 70 BPM | WEIGHT: 161.63 LBS

## 2022-05-26 DIAGNOSIS — M25.512 ACUTE PAIN OF LEFT SHOULDER: Primary | ICD-10-CM

## 2022-05-26 PROCEDURE — 3078F PR MOST RECENT DIASTOLIC BLOOD PRESSURE < 80 MM HG: ICD-10-PCS | Mod: CPTII,S$GLB,, | Performed by: PHYSICIAN ASSISTANT

## 2022-05-26 PROCEDURE — 99999 PR PBB SHADOW E&M-EST. PATIENT-LVL III: CPT | Mod: PBBFAC,,, | Performed by: PHYSICIAN ASSISTANT

## 2022-05-26 PROCEDURE — 3078F DIAST BP <80 MM HG: CPT | Mod: CPTII,S$GLB,, | Performed by: PHYSICIAN ASSISTANT

## 2022-05-26 PROCEDURE — 1160F PR REVIEW ALL MEDS BY PRESCRIBER/CLIN PHARMACIST DOCUMENTED: ICD-10-PCS | Mod: CPTII,S$GLB,, | Performed by: PHYSICIAN ASSISTANT

## 2022-05-26 PROCEDURE — 3074F PR MOST RECENT SYSTOLIC BLOOD PRESSURE < 130 MM HG: ICD-10-PCS | Mod: CPTII,S$GLB,, | Performed by: PHYSICIAN ASSISTANT

## 2022-05-26 PROCEDURE — 1159F PR MEDICATION LIST DOCUMENTED IN MEDICAL RECORD: ICD-10-PCS | Mod: CPTII,S$GLB,, | Performed by: PHYSICIAN ASSISTANT

## 2022-05-26 PROCEDURE — 3008F PR BODY MASS INDEX (BMI) DOCUMENTED: ICD-10-PCS | Mod: CPTII,S$GLB,, | Performed by: PHYSICIAN ASSISTANT

## 2022-05-26 PROCEDURE — 99213 PR OFFICE/OUTPT VISIT, EST, LEVL III, 20-29 MIN: ICD-10-PCS | Mod: S$GLB,,, | Performed by: PHYSICIAN ASSISTANT

## 2022-05-26 PROCEDURE — 1159F MED LIST DOCD IN RCRD: CPT | Mod: CPTII,S$GLB,, | Performed by: PHYSICIAN ASSISTANT

## 2022-05-26 PROCEDURE — 1160F RVW MEDS BY RX/DR IN RCRD: CPT | Mod: CPTII,S$GLB,, | Performed by: PHYSICIAN ASSISTANT

## 2022-05-26 PROCEDURE — 99999 PR PBB SHADOW E&M-EST. PATIENT-LVL III: ICD-10-PCS | Mod: PBBFAC,,, | Performed by: PHYSICIAN ASSISTANT

## 2022-05-26 PROCEDURE — 3074F SYST BP LT 130 MM HG: CPT | Mod: CPTII,S$GLB,, | Performed by: PHYSICIAN ASSISTANT

## 2022-05-26 PROCEDURE — 99213 OFFICE O/P EST LOW 20 MIN: CPT | Mod: S$GLB,,, | Performed by: PHYSICIAN ASSISTANT

## 2022-05-26 PROCEDURE — 3008F BODY MASS INDEX DOCD: CPT | Mod: CPTII,S$GLB,, | Performed by: PHYSICIAN ASSISTANT
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
## 2022-06-02 ENCOUNTER — OFFICE VISIT (OUTPATIENT)
Dept: ORTHOPEDICS | Facility: CLINIC | Age: 27
End: 2022-06-02
Payer: COMMERCIAL

## 2022-06-02 ENCOUNTER — TELEPHONE (OUTPATIENT)
Dept: ORTHOPEDICS | Facility: CLINIC | Age: 27
End: 2022-06-02

## 2022-06-02 ENCOUNTER — HOSPITAL ENCOUNTER (OUTPATIENT)
Dept: RADIOLOGY | Facility: HOSPITAL | Age: 27
Discharge: HOME | End: 2022-06-02
Attending: PHYSICIAN ASSISTANT
Payer: COMMERCIAL

## 2022-06-02 VITALS
WEIGHT: 161.19 LBS | BODY MASS INDEX: 27.52 KG/M2 | RESPIRATION RATE: 18 BRPM | DIASTOLIC BLOOD PRESSURE: 70 MMHG | SYSTOLIC BLOOD PRESSURE: 110 MMHG | HEIGHT: 64 IN | HEART RATE: 63 BPM

## 2022-06-02 DIAGNOSIS — M25.512 ACUTE PAIN OF LEFT SHOULDER: ICD-10-CM

## 2022-06-02 DIAGNOSIS — M75.112 INCOMPLETE TEAR OF LEFT ROTATOR CUFF, UNSPECIFIED WHETHER TRAUMATIC: Primary | ICD-10-CM

## 2022-06-02 PROCEDURE — 1159F MED LIST DOCD IN RCRD: CPT | Mod: CPTII,S$GLB,, | Performed by: PHYSICIAN ASSISTANT

## 2022-06-02 PROCEDURE — 73221 MRI SHOULDER WITHOUT CONTRAST LEFT: ICD-10-PCS | Mod: 26,LT,, | Performed by: RADIOLOGY

## 2022-06-02 PROCEDURE — 99213 PR OFFICE/OUTPT VISIT, EST, LEVL III, 20-29 MIN: ICD-10-PCS | Mod: S$GLB,,, | Performed by: PHYSICIAN ASSISTANT

## 2022-06-02 PROCEDURE — 3074F PR MOST RECENT SYSTOLIC BLOOD PRESSURE < 130 MM HG: ICD-10-PCS | Mod: CPTII,S$GLB,, | Performed by: PHYSICIAN ASSISTANT

## 2022-06-02 PROCEDURE — 99213 OFFICE O/P EST LOW 20 MIN: CPT | Mod: S$GLB,,, | Performed by: PHYSICIAN ASSISTANT

## 2022-06-02 PROCEDURE — 1160F PR REVIEW ALL MEDS BY PRESCRIBER/CLIN PHARMACIST DOCUMENTED: ICD-10-PCS | Mod: CPTII,S$GLB,, | Performed by: PHYSICIAN ASSISTANT

## 2022-06-02 PROCEDURE — 3008F PR BODY MASS INDEX (BMI) DOCUMENTED: ICD-10-PCS | Mod: CPTII,S$GLB,, | Performed by: PHYSICIAN ASSISTANT

## 2022-06-02 PROCEDURE — 3078F DIAST BP <80 MM HG: CPT | Mod: CPTII,S$GLB,, | Performed by: PHYSICIAN ASSISTANT

## 2022-06-02 PROCEDURE — 3078F PR MOST RECENT DIASTOLIC BLOOD PRESSURE < 80 MM HG: ICD-10-PCS | Mod: CPTII,S$GLB,, | Performed by: PHYSICIAN ASSISTANT

## 2022-06-02 PROCEDURE — 3074F SYST BP LT 130 MM HG: CPT | Mod: CPTII,S$GLB,, | Performed by: PHYSICIAN ASSISTANT

## 2022-06-02 PROCEDURE — 1160F RVW MEDS BY RX/DR IN RCRD: CPT | Mod: CPTII,S$GLB,, | Performed by: PHYSICIAN ASSISTANT

## 2022-06-02 PROCEDURE — 73221 MRI JOINT UPR EXTREM W/O DYE: CPT | Mod: TC,LT

## 2022-06-02 PROCEDURE — 1159F PR MEDICATION LIST DOCUMENTED IN MEDICAL RECORD: ICD-10-PCS | Mod: CPTII,S$GLB,, | Performed by: PHYSICIAN ASSISTANT

## 2022-06-02 PROCEDURE — 99999 PR PBB SHADOW E&M-EST. PATIENT-LVL IV: ICD-10-PCS | Mod: PBBFAC,,, | Performed by: PHYSICIAN ASSISTANT

## 2022-06-02 PROCEDURE — 3008F BODY MASS INDEX DOCD: CPT | Mod: CPTII,S$GLB,, | Performed by: PHYSICIAN ASSISTANT

## 2022-06-02 PROCEDURE — 99999 PR PBB SHADOW E&M-EST. PATIENT-LVL IV: CPT | Mod: PBBFAC,,, | Performed by: PHYSICIAN ASSISTANT

## 2022-06-02 PROCEDURE — 73221 MRI JOINT UPR EXTREM W/O DYE: CPT | Mod: 26,LT,, | Performed by: RADIOLOGY

## 2022-06-02 NOTE — TELEPHONE ENCOUNTER
Physical Therapy referral signed by LEA Swain and faxed to [facility] in [place] at [phone].  Confirmation received.
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
## 2022-06-16 ENCOUNTER — PATIENT MESSAGE (OUTPATIENT)
Dept: OBSTETRICS AND GYNECOLOGY | Facility: CLINIC | Age: 27
End: 2022-06-16
Payer: COMMERCIAL

## 2022-06-21 ENCOUNTER — OFFICE VISIT (OUTPATIENT)
Dept: OBSTETRICS AND GYNECOLOGY | Facility: CLINIC | Age: 27
End: 2022-06-21
Payer: COMMERCIAL

## 2022-06-21 VITALS
SYSTOLIC BLOOD PRESSURE: 110 MMHG | BODY MASS INDEX: 27.49 KG/M2 | DIASTOLIC BLOOD PRESSURE: 68 MMHG | WEIGHT: 161 LBS | HEIGHT: 64 IN | RESPIRATION RATE: 12 BRPM | OXYGEN SATURATION: 100 % | HEART RATE: 74 BPM

## 2022-06-21 DIAGNOSIS — L73.2 HYDRADENITIS: Primary | ICD-10-CM

## 2022-06-21 PROCEDURE — 3074F PR MOST RECENT SYSTOLIC BLOOD PRESSURE < 130 MM HG: ICD-10-PCS | Mod: CPTII,S$GLB,, | Performed by: STUDENT IN AN ORGANIZED HEALTH CARE EDUCATION/TRAINING PROGRAM

## 2022-06-21 PROCEDURE — 99999 PR PBB SHADOW E&M-EST. PATIENT-LVL III: CPT | Mod: PBBFAC,,, | Performed by: STUDENT IN AN ORGANIZED HEALTH CARE EDUCATION/TRAINING PROGRAM

## 2022-06-21 PROCEDURE — 1159F PR MEDICATION LIST DOCUMENTED IN MEDICAL RECORD: ICD-10-PCS | Mod: CPTII,S$GLB,, | Performed by: STUDENT IN AN ORGANIZED HEALTH CARE EDUCATION/TRAINING PROGRAM

## 2022-06-21 PROCEDURE — 3078F DIAST BP <80 MM HG: CPT | Mod: CPTII,S$GLB,, | Performed by: STUDENT IN AN ORGANIZED HEALTH CARE EDUCATION/TRAINING PROGRAM

## 2022-06-21 PROCEDURE — 3078F PR MOST RECENT DIASTOLIC BLOOD PRESSURE < 80 MM HG: ICD-10-PCS | Mod: CPTII,S$GLB,, | Performed by: STUDENT IN AN ORGANIZED HEALTH CARE EDUCATION/TRAINING PROGRAM

## 2022-06-21 PROCEDURE — 1160F RVW MEDS BY RX/DR IN RCRD: CPT | Mod: CPTII,S$GLB,, | Performed by: STUDENT IN AN ORGANIZED HEALTH CARE EDUCATION/TRAINING PROGRAM

## 2022-06-21 PROCEDURE — 3008F PR BODY MASS INDEX (BMI) DOCUMENTED: ICD-10-PCS | Mod: CPTII,S$GLB,, | Performed by: STUDENT IN AN ORGANIZED HEALTH CARE EDUCATION/TRAINING PROGRAM

## 2022-06-21 PROCEDURE — 3074F SYST BP LT 130 MM HG: CPT | Mod: CPTII,S$GLB,, | Performed by: STUDENT IN AN ORGANIZED HEALTH CARE EDUCATION/TRAINING PROGRAM

## 2022-06-21 PROCEDURE — 1159F MED LIST DOCD IN RCRD: CPT | Mod: CPTII,S$GLB,, | Performed by: STUDENT IN AN ORGANIZED HEALTH CARE EDUCATION/TRAINING PROGRAM

## 2022-06-21 PROCEDURE — 99999 PR PBB SHADOW E&M-EST. PATIENT-LVL III: ICD-10-PCS | Mod: PBBFAC,,, | Performed by: STUDENT IN AN ORGANIZED HEALTH CARE EDUCATION/TRAINING PROGRAM

## 2022-06-21 PROCEDURE — 99213 PR OFFICE/OUTPT VISIT, EST, LEVL III, 20-29 MIN: ICD-10-PCS | Mod: S$GLB,,, | Performed by: STUDENT IN AN ORGANIZED HEALTH CARE EDUCATION/TRAINING PROGRAM

## 2022-06-21 PROCEDURE — 3008F BODY MASS INDEX DOCD: CPT | Mod: CPTII,S$GLB,, | Performed by: STUDENT IN AN ORGANIZED HEALTH CARE EDUCATION/TRAINING PROGRAM

## 2022-06-21 PROCEDURE — 1160F PR REVIEW ALL MEDS BY PRESCRIBER/CLIN PHARMACIST DOCUMENTED: ICD-10-PCS | Mod: CPTII,S$GLB,, | Performed by: STUDENT IN AN ORGANIZED HEALTH CARE EDUCATION/TRAINING PROGRAM

## 2022-06-21 PROCEDURE — 99213 OFFICE O/P EST LOW 20 MIN: CPT | Mod: S$GLB,,, | Performed by: STUDENT IN AN ORGANIZED HEALTH CARE EDUCATION/TRAINING PROGRAM

## 2022-06-21 RX ORDER — CLINDAMYCIN PHOSPHATE 20 MG/G
CREAM VAGINAL 2 TIMES DAILY
Qty: 40 G | Refills: 2 | Status: SHIPPED | OUTPATIENT
Start: 2022-06-21 | End: 2022-06-28

## 2022-06-21 NOTE — PROGRESS NOTES
Subjective:       Patient ID: Carline Iqbal is a 27 y.o. female.    Chief Complaint:  Cyst      History of Present Illness  Patient is a 26 yo  presenting with bilateral lesions of the groin. She reports that the lesions started about 2 weeks ago with the one on the right opening spontaneously.         GYN & OB History  No LMP recorded.   Date of Last Pap: 3/12/2021    OB History    Para Term  AB Living   1 1 1 0 0 1   SAB IAB Ectopic Multiple Live Births   0 0 0 0 1      # Outcome Date GA Lbr Jeffrey/2nd Weight Sex Delivery Anes PTL Lv   1 Term 22 37w5d 25:34 / 01:16 3.09 kg (6 lb 13 oz) F Vag-Spont EPI Y IWONA       Review of Systems  Review of Systems   Constitutional: Negative for chills, fatigue, fever and unexpected weight change.   HENT: Negative for congestion, sinus pain and sore throat.    Eyes: Negative for visual disturbance.   Respiratory: Negative for cough, chest tightness and shortness of breath.    Cardiovascular: Negative for chest pain and palpitations.   Gastrointestinal: Negative for abdominal pain, constipation, diarrhea, nausea and vomiting.   Genitourinary: Negative for menstrual problem, vaginal bleeding, vaginal discharge and vaginal pain.        Groin lesions bilaterally      Musculoskeletal: Negative for myalgias.   Skin: Negative for color change, pallor and rash.   Neurological: Negative for dizziness, light-headedness and headaches.   Psychiatric/Behavioral: The patient is not nervous/anxious.            Objective:    Physical Exam:   Constitutional: She is oriented to person, place, and time. She appears well-developed and well-nourished. No distress.    HENT:   Head: Normocephalic and atraumatic.    Eyes: Pupils are equal, round, and reactive to light. EOM are normal.     Cardiovascular: Normal rate.     Pulmonary/Chest: Effort normal. No respiratory distress.        Abdominal: Soft. There is no abdominal tenderness.     Genitourinary:          Pelvic exam  was performed with patient supine.                 Neurological: She is alert and oriented to person, place, and time.     Psychiatric: She has a normal mood and affect. Her behavior is normal. Judgment and thought content normal.          Assessment:        1. Hydradenitis               Plan:      Carline was seen today for cyst.    Diagnoses and all orders for this visit:    Hydradenitis  -     clindamycin (CLINDESSE) 2 % vaginal cream; Place vaginally 2 (two) times a day. for 7 days

## 2022-07-19 ENCOUNTER — HOSPITAL ENCOUNTER (EMERGENCY)
Facility: HOSPITAL | Age: 27
Discharge: HOME | End: 2022-07-19
Attending: STUDENT IN AN ORGANIZED HEALTH CARE EDUCATION/TRAINING PROGRAM
Payer: COMMERCIAL

## 2022-07-19 VITALS
DIASTOLIC BLOOD PRESSURE: 85 MMHG | BODY MASS INDEX: 27.79 KG/M2 | HEART RATE: 78 BPM | WEIGHT: 161.94 LBS | TEMPERATURE: 98 F | SYSTOLIC BLOOD PRESSURE: 125 MMHG | RESPIRATION RATE: 16 BRPM | OXYGEN SATURATION: 97 %

## 2022-07-19 DIAGNOSIS — B34.9 VIRAL SYNDROME: Primary | ICD-10-CM

## 2022-07-19 LAB
GROUP A STREP, MOLECULAR: NEGATIVE
INFLUENZA A, MOLECULAR: NEGATIVE
INFLUENZA B, MOLECULAR: NEGATIVE
SARS-COV-2 RDRP RESP QL NAA+PROBE: NEGATIVE
SPECIMEN SOURCE: NORMAL

## 2022-07-19 PROCEDURE — 99283 EMERGENCY DEPT VISIT LOW MDM: CPT

## 2022-07-19 PROCEDURE — 87502 INFLUENZA DNA AMP PROBE: CPT | Performed by: STUDENT IN AN ORGANIZED HEALTH CARE EDUCATION/TRAINING PROGRAM

## 2022-07-19 PROCEDURE — 87651 STREP A DNA AMP PROBE: CPT | Performed by: STUDENT IN AN ORGANIZED HEALTH CARE EDUCATION/TRAINING PROGRAM

## 2022-07-19 PROCEDURE — U0002 COVID-19 LAB TEST NON-CDC: HCPCS | Performed by: STUDENT IN AN ORGANIZED HEALTH CARE EDUCATION/TRAINING PROGRAM

## 2022-07-20 NOTE — ED PROVIDER NOTES
Encounter Date: 7/19/2022       History     Chief Complaint   Patient presents with    Sore Throat     Patient to ER with a sore throat that started this morning, she states her daughter has fever and they were exposed to COVID     27-year-old female with no medical history presenting with cough, sore throat, and congestion.  Patient denies fever.  Patient is here with younger daughter who is suffering from cough and fever.  Family had a COVID exposure.  No other complaints.  No chest pain or shortness of breath.        Review of patient's allergies indicates:  No Known Allergies  Past Medical History:   Diagnosis Date    Asthma     Eczema     Pilonidal cyst 04/2016     Past Surgical History:   Procedure Laterality Date    pilonidal cyst removal      TYMPANOSTOMY TUBE PLACEMENT Bilateral      Family History   Problem Relation Age of Onset    Diabetes Maternal Grandmother     Breast cancer Maternal Grandmother 28    Stroke Maternal Grandmother     Cancer Maternal Grandmother         breast and ovarian    Cancer Paternal Grandmother         throat    Stroke Maternal Grandfather     Hypertension Father     Hypertension Mother     Anesthesia problems Neg Hx     Colon cancer Neg Hx     Ovarian cancer Neg Hx     Heart disease Neg Hx      Social History     Tobacco Use    Smoking status: Never Smoker    Smokeless tobacco: Never Used   Substance Use Topics    Alcohol use: Not Currently     Alcohol/week: 0.0 standard drinks     Comment: seldomly    Drug use: No     Review of Systems   Constitutional: Negative for fever.   HENT: Positive for congestion and sore throat.    Respiratory: Positive for cough. Negative for shortness of breath.    Cardiovascular: Negative for chest pain.   Gastrointestinal: Negative for nausea.   Genitourinary: Negative for dysuria.   Musculoskeletal: Positive for myalgias. Negative for back pain.   Skin: Negative for rash.   Neurological: Negative for weakness.    Hematological: Does not bruise/bleed easily.       Physical Exam     Initial Vitals [07/19/22 2041]   BP Pulse Resp Temp SpO2   125/85 78 16 97.5 °F (36.4 °C) 97 %      MAP       --         Physical Exam    Nursing note and vitals reviewed.  Constitutional: She appears well-developed.   HENT:   Head: Normocephalic.   Mouth/Throat: Oropharynx is clear and moist.   Eyes: Pupils are equal, round, and reactive to light.   Neck:   Normal range of motion.  Cardiovascular:   No murmur heard.  Pulmonary/Chest: No respiratory distress. She has no wheezes. She has no rhonchi. She has no rales.   Abdominal: Abdomen is soft.   Musculoskeletal:         General: No edema.      Cervical back: Normal range of motion.     Neurological: She is alert.   Skin: Skin is warm.   Psychiatric: She has a normal mood and affect.         ED Course   Procedures  Labs Reviewed   INFLUENZA A & B BY MOLECULAR   GROUP A STREP, MOLECULAR   SARS-COV-2 RNA AMPLIFICATION, QUAL          Imaging Results    None          Medications - No data to display  Medical Decision Making:   Differential Diagnosis:   DDX:  Patient presenting with symptoms of an upper respiratory virus.  Concern for COVID, especially given recent surges in the current pandemic.  Unlikely pneumonia based on history, exam, vitals.  Do not suspect OM given sxs.  DX:  COVID.  Influenza.  Strep.  TX: Analgesia PRN. No indication for antibiotics.  Given patient is not hypoxic, no indications for steroids this time.  Dispo: Discharge to follow up with PMD within 3-5 days with precautions for RTED and supportive care recommendations.                        Clinical Impression:   Final diagnoses:  [B34.9] Viral syndrome (Primary)                 Jose Beltran MD  07/19/22 2051

## 2022-07-21 ENCOUNTER — HOSPITAL ENCOUNTER (EMERGENCY)
Facility: HOSPITAL | Age: 27
Discharge: HOME | End: 2022-07-21
Attending: STUDENT IN AN ORGANIZED HEALTH CARE EDUCATION/TRAINING PROGRAM
Payer: COMMERCIAL

## 2022-07-21 VITALS
DIASTOLIC BLOOD PRESSURE: 75 MMHG | TEMPERATURE: 99 F | WEIGHT: 156.88 LBS | BODY MASS INDEX: 26.92 KG/M2 | OXYGEN SATURATION: 98 % | RESPIRATION RATE: 20 BRPM | SYSTOLIC BLOOD PRESSURE: 122 MMHG | HEART RATE: 98 BPM

## 2022-07-21 DIAGNOSIS — U07.1 COVID-19: Primary | ICD-10-CM

## 2022-07-21 LAB — SARS-COV-2 RDRP RESP QL NAA+PROBE: POSITIVE

## 2022-07-21 PROCEDURE — 99282 EMERGENCY DEPT VISIT SF MDM: CPT

## 2022-07-21 PROCEDURE — U0002 COVID-19 LAB TEST NON-CDC: HCPCS | Performed by: STUDENT IN AN ORGANIZED HEALTH CARE EDUCATION/TRAINING PROGRAM

## 2022-07-21 NOTE — ED PROVIDER NOTES
[facility]                                                  Chief Complaint     Chief Complaint   Patient presents with    COVID-19 Concerns     Pt requesting a covid re-test after testing negative on Tuesday. C/O throat pain and congestion       History of Present Illness  27 y.o. female with PMHx of asthma and eczema who presents with headache and nasal congestion.  Her uncle was recently diagnosed with covid. Denies recent travel. She has not received covid vaccine. She did not receive her flu vaccine. Denies fever,myalgias, rhinorrhea, chills, earache, ear discharge, vomiting or diarrhea.    History obtained from:  Patient    Review of patient's allergies indicates:  No Known Allergies  Past Medical History:   Diagnosis Date    Asthma     Eczema     Pilonidal cyst 04/2016     Past Surgical History:   Procedure Laterality Date    pilonidal cyst removal      TYMPANOSTOMY TUBE PLACEMENT Bilateral       Family History   Problem Relation Age of Onset    Diabetes Maternal Grandmother     Breast cancer Maternal Grandmother 28    Stroke Maternal Grandmother     Cancer Maternal Grandmother         breast and ovarian    Cancer Paternal Grandmother         throat    Stroke Maternal Grandfather     Hypertension Father     Hypertension Mother     Anesthesia problems Neg Hx     Colon cancer Neg Hx     Ovarian cancer Neg Hx     Heart disease Neg Hx      Social History     Tobacco Use    Smoking status: Never Smoker    Smokeless tobacco: Never Used   Substance Use Topics    Alcohol use: Not Currently     Alcohol/week: 0.0 standard drinks     Comment: seldomly    Drug use: No          Review of Systems and Physical Exam     Review of Systems      + Headache, sinus congestion    All other symptoms negative as stated below    --Constitutional - Denies fever, appetite change, chills  --Eyes - Denies eye discharge, eye pain, eye redness or visual disturbance  --HENT- Denies sore throat, drooling,  ear discharge, rhinorrhea or trouble swallowing  --Respiratory - Denies cough, shortness of breath, wheezing  --Cardiovascular - Denies chest pain, leg swelling, palpitations  --Gastrointestinal - Denies abdominal pain, abdominal distension, constipation, diarrhea, nausea or vomiting  --Genitourinary - Denies difficulty urinating, dysuria, hematuria, urgency  --Musculoskeletal - Denies arthralgias, myalgias  --Neurological - Denies dizziness,, lightheadedness, weakness  --Hematologic - Denies easy bruising or easy bleeding  --Skin - Denies rash, wound or pallor  --Psychiatric- Denies dysphoric mood, nervousness/anxiety    Vital Signs   weight is 71.2 kg (156 lb 13.7 oz). Her oral temperature is 98.5 °F (36.9 °C). Her blood pressure is 122/75 and her pulse is 98. Her respiration is 20 and oxygen saturation is 98%.      Physical Exam   Nursing note and vitals reviewed  --Constitutional:  Well developed, well nourished. In no acute distress.  --HENT: Normocephalic, atraumatic. No rhinorrhea. Moist oral mucosa. No oropharyngeal edema, erythema or exudates.   --Eyes: PERRL. Extraocular movements intact. No periorbital swelling. Normal conjunctiva.  --Neck: Normal range of motion. Neck supple. No adenopathy  --Cardiac: Regular rhythm, normal S1, normal S2, no murmur, normal rate, intact distal pulses  --Pulmonary: Normal respiratory effort, breath sounds normal and equal bilaterally, no accessory muscle use, no respiratory distress  --Abdominal: Soft, normal bowel sounds, no tenderness, no guarding, no rebound  --Musculoskeletal: Normal range of motion. No deformity, tenderness or edema.  --Neurological:  Alert and oriented x 4. Follows commands appropriately.    --Skin: Warm and dry. No rash, pallor, cyanosis or jaundice.  --Psych: Normal mood    ED Course     Lab Results (reviewed by me)  Labs Reviewed   SARS-COV-2 RNA AMPLIFICATION, QUAL - Abnormal; Notable for the following components:       Result Value    SARS-CoV-2  RNA, Amplification, Qual Positive (*)     All other components within normal limits       Radiology (images visualized & reports reviewed by me)  No orders to display       Medications Given  Medications - No data to display    Differential Diagnosis:  Flu, covid, strep pharyngitis, viral URI, otitis media    Clinical Tests:  Lab Tests: Ordered and reviewed    ED Management     weight is 71.2 kg (156 lb 13.7 oz). Her oral temperature is 98.5 °F (36.9 °C). Her blood pressure is 122/75 and her pulse is 98. Her respiration is 20 and oxygen saturation is 98%.     Physical exam unremarkable. Covid test positive. Patient was given home quarantine instructions per CDC guidelines. Patient deemed stable for discharge. Patient to follow up with primary care in 1-2 days.  Strict return precautions given. Understanding of the plan was expressed and all questions were answered.    Diagnosis  The encounter diagnosis was COVID-19.    Disposition and Plan  Condition: Stable  Disposition: Discharge    ED Prescriptions     None        Follow-up Information     Follow up With Specialties Details Why Contact Info    [facility] - Emergency Dept Emergency Medicine Go to  As needed, If symptoms worsen [address] [place] [id number]  [phone]    Vickie Chicas MD Internal Medicine Schedule an appointment as soon as possible for a visit in 2 days For follow-up of your ED visit [address] [place] [id number]  [phone]                 Manuelito Segundo MD  07/25/22 0637

## 2022-07-21 NOTE — Clinical Note
"Carline"Daysi Iqbal was seen and treated in our emergency department on 7/21/2022.     COVID-19 is present in our communities across the state. There is limited testing for COVID at this time, so not all patients can be tested. In this situation, your employee meets the following criteria:    Carline Iqbal has met the criteria for COVID-19 testing and has a POSITIVE result. She can return to work once they are asymptomatic for 24 hours without the use of fever reducing medications AND at least five days from the first positive result. A mask is recommended for 5 days post quarantine.     If you have any questions or concerns, or if I can be of further assistance, please do not hesitate to contact me.    Sincerely,              RN"

## 2022-07-21 NOTE — DISCHARGE INSTRUCTIONS
Self-quarantine for 5 days.     No traveling for 10 days.    To calculate recommended time frames, day 0 is the day you were tested if you don't have symptoms. If you do have symptoms, day 0 is the date your symptoms started.     Watch for symptoms, kalpana including: fever, trouble breathing, chest pain     On day 6, do a self-check. If you have no symptoms, improving symptoms, or no fever for 24 hrs., no more quarantine is required. However, keep wearing a mask around other people at home and in public for 5 more days (day 6-10).

## 2022-08-24 ENCOUNTER — OFFICE VISIT (OUTPATIENT)
Dept: INTERNAL MEDICINE | Facility: CLINIC | Age: 27
End: 2022-08-24
Payer: COMMERCIAL

## 2022-08-24 VITALS
HEIGHT: 64 IN | OXYGEN SATURATION: 98 % | DIASTOLIC BLOOD PRESSURE: 92 MMHG | RESPIRATION RATE: 18 BRPM | SYSTOLIC BLOOD PRESSURE: 124 MMHG | HEART RATE: 89 BPM | BODY MASS INDEX: 27.77 KG/M2 | WEIGHT: 162.69 LBS

## 2022-08-24 DIAGNOSIS — L20.82 FLEXURAL ECZEMA: ICD-10-CM

## 2022-08-24 DIAGNOSIS — J06.9 VIRAL URI WITH COUGH: Primary | ICD-10-CM

## 2022-08-24 LAB
CTP QC/QA: YES
SARS-COV-2 AG RESP QL IA.RAPID: NEGATIVE

## 2022-08-24 PROCEDURE — 99999 PR PBB SHADOW E&M-EST. PATIENT-LVL III: CPT | Mod: PBBFAC,,, | Performed by: INTERNAL MEDICINE

## 2022-08-24 PROCEDURE — 1160F RVW MEDS BY RX/DR IN RCRD: CPT | Mod: CPTII,S$GLB,, | Performed by: INTERNAL MEDICINE

## 2022-08-24 PROCEDURE — 96372 THER/PROPH/DIAG INJ SC/IM: CPT | Mod: S$GLB,,, | Performed by: INTERNAL MEDICINE

## 2022-08-24 PROCEDURE — 1160F PR REVIEW ALL MEDS BY PRESCRIBER/CLIN PHARMACIST DOCUMENTED: ICD-10-PCS | Mod: CPTII,S$GLB,, | Performed by: INTERNAL MEDICINE

## 2022-08-24 PROCEDURE — 3008F BODY MASS INDEX DOCD: CPT | Mod: CPTII,S$GLB,, | Performed by: INTERNAL MEDICINE

## 2022-08-24 PROCEDURE — 99213 PR OFFICE/OUTPT VISIT, EST, LEVL III, 20-29 MIN: ICD-10-PCS | Mod: 25,S$GLB,, | Performed by: INTERNAL MEDICINE

## 2022-08-24 PROCEDURE — 1159F PR MEDICATION LIST DOCUMENTED IN MEDICAL RECORD: ICD-10-PCS | Mod: CPTII,S$GLB,, | Performed by: INTERNAL MEDICINE

## 2022-08-24 PROCEDURE — 3074F PR MOST RECENT SYSTOLIC BLOOD PRESSURE < 130 MM HG: ICD-10-PCS | Mod: CPTII,S$GLB,, | Performed by: INTERNAL MEDICINE

## 2022-08-24 PROCEDURE — 96372 PR INJECTION,THERAP/PROPH/DIAG2ST, IM OR SUBCUT: ICD-10-PCS | Mod: S$GLB,,, | Performed by: INTERNAL MEDICINE

## 2022-08-24 PROCEDURE — 99213 OFFICE O/P EST LOW 20 MIN: CPT | Mod: 25,S$GLB,, | Performed by: INTERNAL MEDICINE

## 2022-08-24 PROCEDURE — 3080F PR MOST RECENT DIASTOLIC BLOOD PRESSURE >= 90 MM HG: ICD-10-PCS | Mod: CPTII,S$GLB,, | Performed by: INTERNAL MEDICINE

## 2022-08-24 PROCEDURE — 99999 PR PBB SHADOW E&M-EST. PATIENT-LVL III: ICD-10-PCS | Mod: PBBFAC,,, | Performed by: INTERNAL MEDICINE

## 2022-08-24 PROCEDURE — U0002 SARS CORONAVIRUS 2 ANTIGEN POCT: ICD-10-PCS | Mod: QW,S$GLB,, | Performed by: INTERNAL MEDICINE

## 2022-08-24 PROCEDURE — 3080F DIAST BP >= 90 MM HG: CPT | Mod: CPTII,S$GLB,, | Performed by: INTERNAL MEDICINE

## 2022-08-24 PROCEDURE — U0002 COVID-19 LAB TEST NON-CDC: HCPCS | Mod: QW,S$GLB,, | Performed by: INTERNAL MEDICINE

## 2022-08-24 PROCEDURE — 1159F MED LIST DOCD IN RCRD: CPT | Mod: CPTII,S$GLB,, | Performed by: INTERNAL MEDICINE

## 2022-08-24 PROCEDURE — 3008F PR BODY MASS INDEX (BMI) DOCUMENTED: ICD-10-PCS | Mod: CPTII,S$GLB,, | Performed by: INTERNAL MEDICINE

## 2022-08-24 PROCEDURE — 3074F SYST BP LT 130 MM HG: CPT | Mod: CPTII,S$GLB,, | Performed by: INTERNAL MEDICINE

## 2022-08-24 RX ORDER — TRIAMCINOLONE ACETONIDE 40 MG/ML
40 INJECTION, SUSPENSION INTRA-ARTICULAR; INTRAMUSCULAR
Status: COMPLETED | OUTPATIENT
Start: 2022-08-24 | End: 2022-08-24

## 2022-08-24 RX ORDER — TRIAMCINOLONE ACETONIDE 1 MG/G
OINTMENT TOPICAL 2 TIMES DAILY
Qty: 30 G | Refills: 0 | Status: SHIPPED | OUTPATIENT
Start: 2022-08-24 | End: 2023-02-01 | Stop reason: SDUPTHER

## 2022-08-24 RX ADMIN — TRIAMCINOLONE ACETONIDE 40 MG: 40 INJECTION, SUSPENSION INTRA-ARTICULAR; INTRAMUSCULAR at 11:08

## 2022-08-24 NOTE — PROGRESS NOTES
Subjective:       Patient ID: Carline Iqbal is a 27 y.o. female.    Chief Complaint: Nasal Congestion and Cough      HPI:  Patient is known to me and presents with cough and congestion.  Sx started 4 days ago. + sore throat and nasal congestion. Mild cough. No sneezing. No fevers. NO sick contacts. Has tried OTC mucinex witout much relief of sx   No SOB.     COVID negative.   Past Medical History:   Diagnosis Date    Asthma     Eczema     Pilonidal cyst 04/2016       Family History   Problem Relation Age of Onset    Diabetes Maternal Grandmother     Breast cancer Maternal Grandmother 28    Stroke Maternal Grandmother     Cancer Maternal Grandmother         breast and ovarian    Cancer Paternal Grandmother         throat    Stroke Maternal Grandfather     Hypertension Father     Hypertension Mother     Anesthesia problems Neg Hx     Colon cancer Neg Hx     Ovarian cancer Neg Hx     Heart disease Neg Hx        Social History     Socioeconomic History    Marital status: Single   Occupational History    Occupation: [employer]   Tobacco Use    Smoking status: Never Smoker    Smokeless tobacco: Never Used   Substance and Sexual Activity    Alcohol use: Not Currently     Alcohol/week: 0.0 standard drinks     Comment: seldomly    Drug use: No    Sexual activity: Yes     Partners: Male     Birth control/protection: None, Coitus interruptus     Comment: Single       Review of Systems   Constitutional: Negative for activity change, fatigue, fever and unexpected weight change.   HENT: Positive for congestion, postnasal drip and sore throat. Negative for ear pain, hearing loss and rhinorrhea.    Eyes: Negative for pain, redness and visual disturbance.   Respiratory: Positive for cough. Negative for shortness of breath and wheezing.    Cardiovascular: Negative for chest pain, palpitations and leg swelling.   Gastrointestinal: Negative for abdominal pain, constipation, diarrhea, nausea and vomiting.    Genitourinary: Negative for dysuria, frequency and urgency.   Musculoskeletal: Negative for back pain, joint swelling and neck pain.   Skin: Negative for color change, rash and wound.   Neurological: Negative for dizziness, tremors, weakness, light-headedness and headaches.         Objective:      Physical Exam  Vitals reviewed.   Constitutional:       General: She is not in acute distress.     Appearance: She is well-developed.   HENT:      Head: Normocephalic and atraumatic.      Right Ear: External ear normal.      Left Ear: External ear normal.      Nose: Congestion present.      Mouth/Throat:      Pharynx: Posterior oropharyngeal erythema present. No oropharyngeal exudate.   Eyes:      General:         Right eye: No discharge.         Left eye: No discharge.      Extraocular Movements: Extraocular movements intact.      Conjunctiva/sclera: Conjunctivae normal.      Pupils: Pupils are equal, round, and reactive to light.   Neck:      Thyroid: No thyromegaly.   Cardiovascular:      Rate and Rhythm: Normal rate and regular rhythm.      Heart sounds: No murmur heard.  Pulmonary:      Effort: Pulmonary effort is normal. No respiratory distress.      Breath sounds: Normal breath sounds. No wheezing or rales.   Abdominal:      General: Bowel sounds are normal. There is no distension.      Palpations: Abdomen is soft.      Tenderness: There is no abdominal tenderness.   Skin:     General: Skin is warm and dry.   Neurological:      Mental Status: She is alert and oriented to person, place, and time.      Cranial Nerves: No cranial nerve deficit.   Psychiatric:         Behavior: Behavior normal.         Thought Content: Thought content normal.         Assessment:       1. Viral URI with cough    2. Flexural eczema        Plan:       Carline was seen today for nasal congestion and cough.    Diagnoses and all orders for this visit:    Viral URI with cough  -     SARS Coronavirus 2 Antigen, POCT  -     triamcinolone  acetonide injection 40 mg  New problem  IM steroids  Antihistamines  Saline nasal spray    Flexural eczema  -     triamcinolone acetonide 0.1% (KENALOG) 0.1 % ointment; Apply topically 2 (two) times daily. for 10 days  Chronic stable  meds refilled    RTC as scheduled and PRN

## 2023-03-06 ENCOUNTER — OFFICE VISIT (OUTPATIENT)
Dept: OBSTETRICS AND GYNECOLOGY | Facility: CLINIC | Age: 28
End: 2023-03-06
Payer: MEDICAID

## 2023-03-06 VITALS
BODY MASS INDEX: 28.88 KG/M2 | HEIGHT: 64 IN | DIASTOLIC BLOOD PRESSURE: 84 MMHG | SYSTOLIC BLOOD PRESSURE: 122 MMHG | WEIGHT: 169.19 LBS | OXYGEN SATURATION: 98 % | HEART RATE: 90 BPM | RESPIRATION RATE: 18 BRPM

## 2023-03-06 DIAGNOSIS — Z11.3 SCREENING EXAMINATION FOR STD (SEXUALLY TRANSMITTED DISEASE): ICD-10-CM

## 2023-03-06 DIAGNOSIS — N89.8 VAGINAL DISCHARGE: Primary | ICD-10-CM

## 2023-03-06 PROCEDURE — 3074F SYST BP LT 130 MM HG: CPT | Mod: CPTII,,, | Performed by: STUDENT IN AN ORGANIZED HEALTH CARE EDUCATION/TRAINING PROGRAM

## 2023-03-06 PROCEDURE — 3074F PR MOST RECENT SYSTOLIC BLOOD PRESSURE < 130 MM HG: ICD-10-PCS | Mod: CPTII,,, | Performed by: STUDENT IN AN ORGANIZED HEALTH CARE EDUCATION/TRAINING PROGRAM

## 2023-03-06 PROCEDURE — 81514 NFCT DS BV&VAGINITIS DNA ALG: CPT | Performed by: STUDENT IN AN ORGANIZED HEALTH CARE EDUCATION/TRAINING PROGRAM

## 2023-03-06 PROCEDURE — 3008F BODY MASS INDEX DOCD: CPT | Mod: CPTII,,, | Performed by: STUDENT IN AN ORGANIZED HEALTH CARE EDUCATION/TRAINING PROGRAM

## 2023-03-06 PROCEDURE — 1159F PR MEDICATION LIST DOCUMENTED IN MEDICAL RECORD: ICD-10-PCS | Mod: CPTII,,, | Performed by: STUDENT IN AN ORGANIZED HEALTH CARE EDUCATION/TRAINING PROGRAM

## 2023-03-06 PROCEDURE — 99213 OFFICE O/P EST LOW 20 MIN: CPT | Mod: S$PBB,,, | Performed by: STUDENT IN AN ORGANIZED HEALTH CARE EDUCATION/TRAINING PROGRAM

## 2023-03-06 PROCEDURE — 87591 N.GONORRHOEAE DNA AMP PROB: CPT | Performed by: STUDENT IN AN ORGANIZED HEALTH CARE EDUCATION/TRAINING PROGRAM

## 2023-03-06 PROCEDURE — 3008F PR BODY MASS INDEX (BMI) DOCUMENTED: ICD-10-PCS | Mod: CPTII,,, | Performed by: STUDENT IN AN ORGANIZED HEALTH CARE EDUCATION/TRAINING PROGRAM

## 2023-03-06 PROCEDURE — 99213 PR OFFICE/OUTPT VISIT, EST, LEVL III, 20-29 MIN: ICD-10-PCS | Mod: S$PBB,,, | Performed by: STUDENT IN AN ORGANIZED HEALTH CARE EDUCATION/TRAINING PROGRAM

## 2023-03-06 PROCEDURE — 3079F PR MOST RECENT DIASTOLIC BLOOD PRESSURE 80-89 MM HG: ICD-10-PCS | Mod: CPTII,,, | Performed by: STUDENT IN AN ORGANIZED HEALTH CARE EDUCATION/TRAINING PROGRAM

## 2023-03-06 PROCEDURE — 3079F DIAST BP 80-89 MM HG: CPT | Mod: CPTII,,, | Performed by: STUDENT IN AN ORGANIZED HEALTH CARE EDUCATION/TRAINING PROGRAM

## 2023-03-06 PROCEDURE — 99213 OFFICE O/P EST LOW 20 MIN: CPT | Mod: PBBFAC | Performed by: STUDENT IN AN ORGANIZED HEALTH CARE EDUCATION/TRAINING PROGRAM

## 2023-03-06 PROCEDURE — 1159F MED LIST DOCD IN RCRD: CPT | Mod: CPTII,,, | Performed by: STUDENT IN AN ORGANIZED HEALTH CARE EDUCATION/TRAINING PROGRAM

## 2023-03-06 PROCEDURE — 99999 PR PBB SHADOW E&M-EST. PATIENT-LVL III: CPT | Mod: PBBFAC,,, | Performed by: STUDENT IN AN ORGANIZED HEALTH CARE EDUCATION/TRAINING PROGRAM

## 2023-03-06 PROCEDURE — 99999 PR PBB SHADOW E&M-EST. PATIENT-LVL III: ICD-10-PCS | Mod: PBBFAC,,, | Performed by: STUDENT IN AN ORGANIZED HEALTH CARE EDUCATION/TRAINING PROGRAM

## 2023-03-06 RX ORDER — TERCONAZOLE 8 MG/G
1 CREAM VAGINAL NIGHTLY
Qty: 20 G | Refills: 1 | Status: SHIPPED | OUTPATIENT
Start: 2023-03-06 | End: 2023-03-09

## 2023-03-06 NOTE — PROGRESS NOTES
Subjective:    Patient ID: Carline Iqbal is a 28 y.o. y.o. female.     Chief Complaint:   Chief Complaint   Patient presents with    Vaginal Discharge    Vaginal Itching    STD CHECK       History of Present Illness   Carline presents today complaining of discharge, local irritation, and vulvar itching. Symptoms began 2  weeks and have been persistent.  Patient's last menstrual period was 02/12/2023 (exact date).. She also denies dysuria, frequency, and abdominal pain. She is sexually active.    ROS:   CONSTITUTIONAL: Negative for fever, chills, diaphoresis, weakness, fatigue, weight loss, weight gain  GASTROINTESTINAL: negative for abdominal pain, flank pain, nausea, vomiting, diarrhea, constipation, black stool, blood in stool  BREAST: negative for breast  tenderness, breast mass, nipple discharge, or skin changes  GENITOURINARY: see HPI   HEMATOLOGIC/LYMPHATIC: negative for swollen lymph nodes, bleeding, bruising  MUSCULOSKELETAL: negative for back pain, joint pain, joint stiffness, joint swelling, muscle pain, muscle weakness  ENDOCRINE: negative for polydipsia/polyuria, palpitations, skin changes, temperature intolerance, unexpected weight changes      Objective:    Vital Signs:  Vitals:    03/06/23 1230   BP: 122/84   Pulse: 90   Resp: 18       Physical Exam:  General:  alert,normal appearing gravid female   Neuro/Psych: AAOx3, appropriate mood and affect   Head: Normocephalic, atraumatic   Neck: Supple, normal ROM   Resp: Normal effort   Skin:  Skin color, texture, turgor normal. No rashes or lesions   Abdomen:  soft, non-tender; bowel sounds normal   Pelvis: External genitalia: normal general appearance  Urinary system: urethral meatus normal, bladder nontender  Vaginal: normal mucosa without prolapse or lesions, atrophic mucosa  Cervix: normal appearance  Uterus: normal size, shape, position  Adnexa: normal size, nontender bilaterally         Assessment:      1. Vaginal discharge    2. Screening  examination for STD (sexually transmitted disease)          Plan:      Vaginal discharge  -     VAGINOSIS SCREEN BY DNA PROBE  -     terconazole (TERAZOL 3) 0.8 % vaginal cream; Place 1 applicator vaginally every evening. for 3 days  Dispense: 20 g; Refill: 1    Screening examination for STD (sexually transmitted disease)  -     C. trachomatis/N. gonorrhoeae by AMP DNA [facility]; Cervicovaginal

## 2023-03-08 LAB
BACTERIAL VAGINOSIS DNA: NEGATIVE
C TRACH DNA SPEC QL NAA+PROBE: NOT DETECTED
CANDIDA GLABRATA DNA: NEGATIVE
CANDIDA KRUSEI DNA: NEGATIVE
CANDIDA RRNA VAG QL PROBE: POSITIVE
N GONORRHOEA DNA SPEC QL NAA+PROBE: NOT DETECTED
T VAGINALIS RRNA GENITAL QL PROBE: NEGATIVE

## 2023-03-09 NOTE — PROGRESS NOTES
Please call Ms. Crowley to notify her that her vaginosis screen was positive for a vagina yeast infection. A prescription for Terazol cream was given during her appointment.

## 2023-03-29 ENCOUNTER — OFFICE VISIT (OUTPATIENT)
Dept: INTERNAL MEDICINE | Facility: CLINIC | Age: 28
End: 2023-03-29
Payer: MEDICAID

## 2023-03-29 VITALS
DIASTOLIC BLOOD PRESSURE: 66 MMHG | BODY MASS INDEX: 29.5 KG/M2 | HEIGHT: 64 IN | SYSTOLIC BLOOD PRESSURE: 108 MMHG | WEIGHT: 172.81 LBS | HEART RATE: 82 BPM

## 2023-03-29 DIAGNOSIS — M54.50 ACUTE RIGHT-SIDED LOW BACK PAIN WITHOUT SCIATICA: Primary | ICD-10-CM

## 2023-03-29 PROCEDURE — 99999 PR PBB SHADOW E&M-EST. PATIENT-LVL III: CPT | Mod: PBBFAC,,, | Performed by: INTERNAL MEDICINE

## 2023-03-29 PROCEDURE — 1160F PR REVIEW ALL MEDS BY PRESCRIBER/CLIN PHARMACIST DOCUMENTED: ICD-10-PCS | Mod: CPTII,,, | Performed by: INTERNAL MEDICINE

## 2023-03-29 PROCEDURE — 3078F PR MOST RECENT DIASTOLIC BLOOD PRESSURE < 80 MM HG: ICD-10-PCS | Mod: CPTII,,, | Performed by: INTERNAL MEDICINE

## 2023-03-29 PROCEDURE — 3078F DIAST BP <80 MM HG: CPT | Mod: CPTII,,, | Performed by: INTERNAL MEDICINE

## 2023-03-29 PROCEDURE — 1160F RVW MEDS BY RX/DR IN RCRD: CPT | Mod: CPTII,,, | Performed by: INTERNAL MEDICINE

## 2023-03-29 PROCEDURE — 99213 OFFICE O/P EST LOW 20 MIN: CPT | Mod: PBBFAC | Performed by: INTERNAL MEDICINE

## 2023-03-29 PROCEDURE — 99213 OFFICE O/P EST LOW 20 MIN: CPT | Mod: S$PBB,,, | Performed by: INTERNAL MEDICINE

## 2023-03-29 PROCEDURE — 3008F PR BODY MASS INDEX (BMI) DOCUMENTED: ICD-10-PCS | Mod: CPTII,,, | Performed by: INTERNAL MEDICINE

## 2023-03-29 PROCEDURE — 99999 PR PBB SHADOW E&M-EST. PATIENT-LVL III: ICD-10-PCS | Mod: PBBFAC,,, | Performed by: INTERNAL MEDICINE

## 2023-03-29 PROCEDURE — 3074F PR MOST RECENT SYSTOLIC BLOOD PRESSURE < 130 MM HG: ICD-10-PCS | Mod: CPTII,,, | Performed by: INTERNAL MEDICINE

## 2023-03-29 PROCEDURE — 99213 PR OFFICE/OUTPT VISIT, EST, LEVL III, 20-29 MIN: ICD-10-PCS | Mod: S$PBB,,, | Performed by: INTERNAL MEDICINE

## 2023-03-29 PROCEDURE — 3008F BODY MASS INDEX DOCD: CPT | Mod: CPTII,,, | Performed by: INTERNAL MEDICINE

## 2023-03-29 PROCEDURE — 1159F MED LIST DOCD IN RCRD: CPT | Mod: CPTII,,, | Performed by: INTERNAL MEDICINE

## 2023-03-29 PROCEDURE — 3074F SYST BP LT 130 MM HG: CPT | Mod: CPTII,,, | Performed by: INTERNAL MEDICINE

## 2023-03-29 PROCEDURE — 1159F PR MEDICATION LIST DOCUMENTED IN MEDICAL RECORD: ICD-10-PCS | Mod: CPTII,,, | Performed by: INTERNAL MEDICINE

## 2023-03-29 NOTE — PROGRESS NOTES
Subjective:       Patient ID: Carline Iqbal is a 28 y.o. female.    Chief Complaint: Back Pain (Pt states she is having random back pain over the last 3 months. She states sometimes it's the top and sometimes the bottom )      HPI:  Patient is known to me and presents with back pain. Sx started 3 months ago. Pain comes and goes. Pain lasts for a few days and then resolves. Worse with bending forward and prolonged standing walking. Better with laying down. No specific injuries. Has not tried anything OTC for pain control.       Past Medical History:   Diagnosis Date    Asthma     Eczema     Pilonidal cyst 04/2016       Family History   Problem Relation Age of Onset    Hypertension Mother     Hypertension Father     Diabetes Maternal Grandmother     Breast cancer Maternal Grandmother 28    Stroke Maternal Grandmother     Cancer Maternal Grandmother         breast and ovarian    Stroke Maternal Grandfather     Cancer Paternal Grandmother         throat    Anesthesia problems Neg Hx     Colon cancer Neg Hx     Ovarian cancer Neg Hx     Heart disease Neg Hx        Social History     Socioeconomic History    Marital status: Single   Occupational History    Occupation: [employer]   Tobacco Use    Smoking status: Never    Smokeless tobacco: Never   Substance and Sexual Activity    Alcohol use: Not Currently     Alcohol/week: 0.0 standard drinks     Comment: seldomly    Drug use: No    Sexual activity: Yes     Partners: Male     Birth control/protection: None, Coitus interruptus     Comment: Single       Review of Systems   Constitutional:  Negative for fever.   Cardiovascular:  Negative for chest pain.   Gastrointestinal:  Negative for abdominal pain.   Genitourinary:  Negative for dysuria, hematuria and pelvic pain.   Musculoskeletal:  Positive for back pain.   Neurological:  Negative for weakness, numbness and headaches.       Objective:      Physical Exam  Vitals reviewed.   Constitutional:       General: She  is not in acute distress.     Appearance: She is well-developed.   HENT:      Head: Normocephalic and atraumatic.      Right Ear: External ear normal.      Left Ear: External ear normal.      Nose: Nose normal.   Eyes:      General:         Right eye: No discharge.         Left eye: No discharge.      Extraocular Movements: Extraocular movements intact.      Conjunctiva/sclera: Conjunctivae normal.      Pupils: Pupils are equal, round, and reactive to light.   Neck:      Thyroid: No thyromegaly.   Cardiovascular:      Rate and Rhythm: Normal rate and regular rhythm.      Heart sounds: No murmur heard.  Pulmonary:      Effort: Pulmonary effort is normal. No respiratory distress.      Breath sounds: Normal breath sounds.   Abdominal:      General: Bowel sounds are normal. There is no distension.      Palpations: Abdomen is soft.      Tenderness: There is no abdominal tenderness.   Musculoskeletal:         General: Tenderness present. Injury: right lower trap/rhomboid area and right lumbar paraspinal.  Skin:     General: Skin is warm and dry.   Neurological:      Mental Status: She is alert and oriented to person, place, and time.      Cranial Nerves: No cranial nerve deficit.   Psychiatric:         Behavior: Behavior normal.         Thought Content: Thought content normal.       Assessment:       1. Acute right-sided low back pain without sciatica        Plan:       1. Acute right-sided low back pain without sciatica       New problem  Likely muscle strain, tightness  Massage therapy  Biofreeze  Heat and stretching PRN  Tyelnol and ibuprofen PRN are find  If not improving call me and can refer to PT next

## 2023-05-10 ENCOUNTER — OFFICE VISIT (OUTPATIENT)
Dept: INTERNAL MEDICINE | Facility: CLINIC | Age: 28
End: 2023-05-10
Payer: MEDICAID

## 2023-05-10 VITALS
RESPIRATION RATE: 16 BRPM | SYSTOLIC BLOOD PRESSURE: 122 MMHG | WEIGHT: 170.19 LBS | DIASTOLIC BLOOD PRESSURE: 78 MMHG | HEART RATE: 72 BPM | HEIGHT: 64 IN | BODY MASS INDEX: 29.06 KG/M2 | OXYGEN SATURATION: 99 %

## 2023-05-10 DIAGNOSIS — J06.9 VIRAL URI WITH COUGH: Primary | ICD-10-CM

## 2023-05-10 DIAGNOSIS — J45.901 MILD ASTHMA WITH EXACERBATION, UNSPECIFIED WHETHER PERSISTENT: ICD-10-CM

## 2023-05-10 LAB
CTP QC/QA: YES
SARS-COV-2 AG RESP QL IA.RAPID: NEGATIVE

## 2023-05-10 PROCEDURE — 99999 PR PBB SHADOW E&M-EST. PATIENT-LVL III: CPT | Mod: PBBFAC,,, | Performed by: INTERNAL MEDICINE

## 2023-05-10 PROCEDURE — 3074F SYST BP LT 130 MM HG: CPT | Mod: CPTII,,, | Performed by: INTERNAL MEDICINE

## 2023-05-10 PROCEDURE — 96372 THER/PROPH/DIAG INJ SC/IM: CPT | Mod: PBBFAC

## 2023-05-10 PROCEDURE — 99213 OFFICE O/P EST LOW 20 MIN: CPT | Mod: S$PBB,,, | Performed by: INTERNAL MEDICINE

## 2023-05-10 PROCEDURE — 1160F PR REVIEW ALL MEDS BY PRESCRIBER/CLIN PHARMACIST DOCUMENTED: ICD-10-PCS | Mod: CPTII,,, | Performed by: INTERNAL MEDICINE

## 2023-05-10 PROCEDURE — 87426 SARSCOV CORONAVIRUS AG IA: CPT | Mod: PBBFAC | Performed by: INTERNAL MEDICINE

## 2023-05-10 PROCEDURE — 3078F PR MOST RECENT DIASTOLIC BLOOD PRESSURE < 80 MM HG: ICD-10-PCS | Mod: CPTII,,, | Performed by: INTERNAL MEDICINE

## 2023-05-10 PROCEDURE — 3078F DIAST BP <80 MM HG: CPT | Mod: CPTII,,, | Performed by: INTERNAL MEDICINE

## 2023-05-10 PROCEDURE — 99213 OFFICE O/P EST LOW 20 MIN: CPT | Mod: PBBFAC,25 | Performed by: INTERNAL MEDICINE

## 2023-05-10 PROCEDURE — 99999 PR PBB SHADOW E&M-EST. PATIENT-LVL III: ICD-10-PCS | Mod: PBBFAC,,, | Performed by: INTERNAL MEDICINE

## 2023-05-10 PROCEDURE — 3074F PR MOST RECENT SYSTOLIC BLOOD PRESSURE < 130 MM HG: ICD-10-PCS | Mod: CPTII,,, | Performed by: INTERNAL MEDICINE

## 2023-05-10 PROCEDURE — 99213 PR OFFICE/OUTPT VISIT, EST, LEVL III, 20-29 MIN: ICD-10-PCS | Mod: S$PBB,,, | Performed by: INTERNAL MEDICINE

## 2023-05-10 PROCEDURE — 1159F PR MEDICATION LIST DOCUMENTED IN MEDICAL RECORD: ICD-10-PCS | Mod: CPTII,,, | Performed by: INTERNAL MEDICINE

## 2023-05-10 PROCEDURE — 3008F BODY MASS INDEX DOCD: CPT | Mod: CPTII,,, | Performed by: INTERNAL MEDICINE

## 2023-05-10 PROCEDURE — 3008F PR BODY MASS INDEX (BMI) DOCUMENTED: ICD-10-PCS | Mod: CPTII,,, | Performed by: INTERNAL MEDICINE

## 2023-05-10 PROCEDURE — 1159F MED LIST DOCD IN RCRD: CPT | Mod: CPTII,,, | Performed by: INTERNAL MEDICINE

## 2023-05-10 PROCEDURE — 1160F RVW MEDS BY RX/DR IN RCRD: CPT | Mod: CPTII,,, | Performed by: INTERNAL MEDICINE

## 2023-05-10 RX ORDER — METHYLPREDNISOLONE ACETATE 40 MG/ML
40 INJECTION, SUSPENSION INTRA-ARTICULAR; INTRALESIONAL; INTRAMUSCULAR; SOFT TISSUE
Status: COMPLETED | OUTPATIENT
Start: 2023-05-10 | End: 2023-05-10

## 2023-05-10 RX ADMIN — METHYLPREDNISOLONE ACETATE 40 MG: 40 INJECTION, SUSPENSION INTRA-ARTICULAR; INTRALESIONAL; INTRAMUSCULAR; SOFT TISSUE at 02:05

## 2023-05-10 NOTE — PROGRESS NOTES
Pt blood sugar 204 in PACU, writer spoke with MD Spence.  MD Spence aware of BS level, declined to treat BS in PACU.  Writer verbalized understanding.    Subjective:       Patient ID: Carline Iqbal is a 28 y.o. female.    Chief Complaint: Headache, Nasal Congestion, and Cough      HPI:  ***    Past Medical History:   Diagnosis Date    Asthma     Eczema     Pilonidal cyst 04/2016       Family History   Problem Relation Age of Onset    Hypertension Mother     Hypertension Father     Diabetes Maternal Grandmother     Breast cancer Maternal Grandmother 28    Stroke Maternal Grandmother     Cancer Maternal Grandmother         breast and ovarian    Stroke Maternal Grandfather     Cancer Paternal Grandmother         throat    Anesthesia problems Neg Hx     Colon cancer Neg Hx     Ovarian cancer Neg Hx     Heart disease Neg Hx        Social History     Socioeconomic History    Marital status: Single   Occupational History    Occupation: [employer] Sales   Tobacco Use    Smoking status: Never    Smokeless tobacco: Never   Substance and Sexual Activity    Alcohol use: Not Currently     Alcohol/week: 0.0 standard drinks     Comment: seldomly    Drug use: No    Sexual activity: Yes     Partners: Male     Birth control/protection: None, Coitus interruptus     Comment: Single       Review of Systems      Objective:      Physical Exam    Assessment:       No diagnosis found.    Plan:       {There are no diagnoses linked to this encounter. (Refresh or delete this SmartLink)}   ***

## 2023-05-10 NOTE — PROGRESS NOTES
Subjective:       Patient ID: Carline Iqbal is a 28 y.o. female.    Chief Complaint: Headache, Nasal Congestion, and Cough      HPI:  Patient is known to me and presents with congestion and headaches. Sx started 4 days ago. Headache started last 2 days. Took Bc powder for headache and resolved but started back with head pain again today. No fevers. + cough. + sneezing. + nasal congestion and sinus pressure. No sore throat or otalgia. + wheezing. Tried OTC mucinex for ocugh and congestion as well without much effect.     Past Medical History:   Diagnosis Date    Asthma     Eczema     Pilonidal cyst 04/2016       Family History   Problem Relation Age of Onset    Hypertension Mother     Hypertension Father     Diabetes Maternal Grandmother     Breast cancer Maternal Grandmother 28    Stroke Maternal Grandmother     Cancer Maternal Grandmother         breast and ovarian    Stroke Maternal Grandfather     Cancer Paternal Grandmother         throat    Anesthesia problems Neg Hx     Colon cancer Neg Hx     Ovarian cancer Neg Hx     Heart disease Neg Hx        Social History     Socioeconomic History    Marital status: Single   Occupational History    Occupation: [employer]   Tobacco Use    Smoking status: Never    Smokeless tobacco: Never   Substance and Sexual Activity    Alcohol use: Not Currently     Alcohol/week: 0.0 standard drinks     Comment: seldomly    Drug use: No    Sexual activity: Yes     Partners: Male     Birth control/protection: None, Coitus interruptus     Comment: Single       Review of Systems   Constitutional:  Negative for activity change and unexpected weight change.   HENT:  Positive for rhinorrhea. Negative for hearing loss and trouble swallowing.    Eyes:  Negative for discharge and visual disturbance.   Respiratory:  Positive for wheezing. Negative for chest tightness.    Cardiovascular:  Negative for chest pain and palpitations.   Gastrointestinal:  Negative for blood in stool,  constipation, diarrhea and vomiting.   Endocrine: Negative for polydipsia and polyuria.   Genitourinary:  Negative for difficulty urinating, dysuria, hematuria and menstrual problem.   Musculoskeletal:  Negative for arthralgias, joint swelling and neck pain.   Neurological:  Positive for headaches. Negative for weakness.   Psychiatric/Behavioral:  Positive for dysphoric mood. Negative for confusion.        Objective:      Physical Exam  Vitals reviewed.   Constitutional:       General: She is not in acute distress.     Appearance: She is well-developed.   HENT:      Head: Normocephalic and atraumatic.      Right Ear: Tympanic membrane and external ear normal.      Left Ear: Tympanic membrane and external ear normal.      Nose: Congestion present.      Mouth/Throat:      Pharynx: No oropharyngeal exudate or posterior oropharyngeal erythema.   Eyes:      General:         Right eye: No discharge.         Left eye: No discharge.      Extraocular Movements: Extraocular movements intact.      Conjunctiva/sclera: Conjunctivae normal.   Neck:      Thyroid: No thyromegaly.   Cardiovascular:      Rate and Rhythm: Normal rate and regular rhythm.      Heart sounds: No murmur heard.  Pulmonary:      Effort: Pulmonary effort is normal. No respiratory distress.      Breath sounds: Wheezing present. No rales.   Skin:     General: Skin is warm and dry.   Neurological:      Mental Status: She is alert and oriented to person, place, and time.      Cranial Nerves: No cranial nerve deficit.   Psychiatric:         Behavior: Behavior normal.         Thought Content: Thought content normal.       Assessment:       1. Viral URI with cough    2. Mild asthma with exacerbation, unspecified whether persistent        Plan:       1. Viral URI with cough  COVID negative  IM steroids for asthma and mild wheezing  Antihistamines daily  Saline nasal spray daily  Mucinex and cough meds OTC fine  Rest, stay hydrated  -     SARS Coronavirus 2 Antigen,  POCT    2. Mild asthma with exacerbation, unspecified whether persistent  IM steroid for mild wheezing  Treat as viral etiology  PRN albuterol, continue taking  -     methylPREDNISolone acetate injection 40 mg

## 2023-07-06 DIAGNOSIS — L20.82 FLEXURAL ECZEMA: ICD-10-CM

## 2023-07-07 RX ORDER — TRIAMCINOLONE ACETONIDE 1 MG/G
OINTMENT TOPICAL 2 TIMES DAILY
Qty: 30 G | Refills: 0 | Status: SHIPPED | OUTPATIENT
Start: 2023-07-07 | End: 2023-11-16 | Stop reason: SDUPTHER

## 2023-09-14 ENCOUNTER — OFFICE VISIT (OUTPATIENT)
Dept: OBSTETRICS AND GYNECOLOGY | Facility: CLINIC | Age: 28
End: 2023-09-14
Payer: MEDICAID

## 2023-09-14 VITALS
DIASTOLIC BLOOD PRESSURE: 68 MMHG | HEART RATE: 82 BPM | SYSTOLIC BLOOD PRESSURE: 118 MMHG | WEIGHT: 163.81 LBS | HEIGHT: 64 IN | BODY MASS INDEX: 27.96 KG/M2

## 2023-09-14 DIAGNOSIS — N89.8 VAGINAL DISCHARGE: Primary | ICD-10-CM

## 2023-09-14 PROCEDURE — 99999 PR PBB SHADOW E&M-EST. PATIENT-LVL III: CPT | Mod: PBBFAC,,, | Performed by: STUDENT IN AN ORGANIZED HEALTH CARE EDUCATION/TRAINING PROGRAM

## 2023-09-14 PROCEDURE — 3078F DIAST BP <80 MM HG: CPT | Mod: CPTII,,, | Performed by: STUDENT IN AN ORGANIZED HEALTH CARE EDUCATION/TRAINING PROGRAM

## 2023-09-14 PROCEDURE — 1159F MED LIST DOCD IN RCRD: CPT | Mod: CPTII,,, | Performed by: STUDENT IN AN ORGANIZED HEALTH CARE EDUCATION/TRAINING PROGRAM

## 2023-09-14 PROCEDURE — 99999 PR PBB SHADOW E&M-EST. PATIENT-LVL III: ICD-10-PCS | Mod: PBBFAC,,, | Performed by: STUDENT IN AN ORGANIZED HEALTH CARE EDUCATION/TRAINING PROGRAM

## 2023-09-14 PROCEDURE — 1159F PR MEDICATION LIST DOCUMENTED IN MEDICAL RECORD: ICD-10-PCS | Mod: CPTII,,, | Performed by: STUDENT IN AN ORGANIZED HEALTH CARE EDUCATION/TRAINING PROGRAM

## 2023-09-14 PROCEDURE — 3008F PR BODY MASS INDEX (BMI) DOCUMENTED: ICD-10-PCS | Mod: CPTII,,, | Performed by: STUDENT IN AN ORGANIZED HEALTH CARE EDUCATION/TRAINING PROGRAM

## 2023-09-14 PROCEDURE — 99213 OFFICE O/P EST LOW 20 MIN: CPT | Mod: S$PBB,,, | Performed by: STUDENT IN AN ORGANIZED HEALTH CARE EDUCATION/TRAINING PROGRAM

## 2023-09-14 PROCEDURE — 3008F BODY MASS INDEX DOCD: CPT | Mod: CPTII,,, | Performed by: STUDENT IN AN ORGANIZED HEALTH CARE EDUCATION/TRAINING PROGRAM

## 2023-09-14 PROCEDURE — 99213 OFFICE O/P EST LOW 20 MIN: CPT | Mod: PBBFAC,PO | Performed by: STUDENT IN AN ORGANIZED HEALTH CARE EDUCATION/TRAINING PROGRAM

## 2023-09-14 PROCEDURE — 99213 PR OFFICE/OUTPT VISIT, EST, LEVL III, 20-29 MIN: ICD-10-PCS | Mod: S$PBB,,, | Performed by: STUDENT IN AN ORGANIZED HEALTH CARE EDUCATION/TRAINING PROGRAM

## 2023-09-14 PROCEDURE — 3078F PR MOST RECENT DIASTOLIC BLOOD PRESSURE < 80 MM HG: ICD-10-PCS | Mod: CPTII,,, | Performed by: STUDENT IN AN ORGANIZED HEALTH CARE EDUCATION/TRAINING PROGRAM

## 2023-09-14 PROCEDURE — 3074F SYST BP LT 130 MM HG: CPT | Mod: CPTII,,, | Performed by: STUDENT IN AN ORGANIZED HEALTH CARE EDUCATION/TRAINING PROGRAM

## 2023-09-14 PROCEDURE — 3074F PR MOST RECENT SYSTOLIC BLOOD PRESSURE < 130 MM HG: ICD-10-PCS | Mod: CPTII,,, | Performed by: STUDENT IN AN ORGANIZED HEALTH CARE EDUCATION/TRAINING PROGRAM

## 2023-09-14 PROCEDURE — 81514 NFCT DS BV&VAGINITIS DNA ALG: CPT | Performed by: STUDENT IN AN ORGANIZED HEALTH CARE EDUCATION/TRAINING PROGRAM

## 2023-09-14 PROCEDURE — 87591 N.GONORRHOEAE DNA AMP PROB: CPT | Performed by: STUDENT IN AN ORGANIZED HEALTH CARE EDUCATION/TRAINING PROGRAM

## 2023-09-14 RX ORDER — METRONIDAZOLE 500 MG/1
500 TABLET ORAL EVERY 12 HOURS
Qty: 14 TABLET | Refills: 0 | Status: SHIPPED | OUTPATIENT
Start: 2023-09-14 | End: 2023-09-21

## 2023-09-14 NOTE — PROGRESS NOTES
Subjective:    Patient ID: Carline Iqbal is a 28 y.o. y.o. female.     Chief Complaint:   Chief Complaint   Patient presents with    STD CHECK       History of Present Illness   Carline presents today complaining of discharge and vulvar itching. Symptoms began  2.5  weeks and have been persistent.  Patient's last menstrual period was 08/22/2023.. She also denies dysuria, frequency, fever, and abdominal pain. She is sexually active.    ROS:   CONSTITUTIONAL: Negative for fever, chills, diaphoresis, weakness, fatigue, weight loss, weight gain  GASTROINTESTINAL: negative for abdominal pain, flank pain, nausea, vomiting, diarrhea, constipation, black stool, blood in stool  BREAST: negative for breast  tenderness, breast mass, nipple discharge, or skin changes  GENITOURINARY: see HPI   HEMATOLOGIC/LYMPHATIC: negative for swollen lymph nodes, bleeding, bruising  MUSCULOSKELETAL: negative for back pain, joint pain, joint stiffness, joint swelling, muscle pain, muscle weakness  ENDOCRINE: negative for polydipsia/polyuria, palpitations, skin changes, temperature intolerance, unexpected weight changes      Objective:    Vital Signs:  Vitals:    09/14/23 1356   BP: 118/68   Pulse: 82       Physical Exam:  General:  alert,normal appearing gravid female   Neuro/Psych: AAOx3, appropriate mood and affect   Head: Normocephalic, atraumatic   Neck: Supple, normal ROM   Resp: Normal effort   Skin:  Skin color, texture, turgor normal. No rashes or lesions   Abdomen:  soft, non-tender; bowel sounds normal   Pelvis: External genitalia: normal general appearance  Urinary system: urethral meatus normal, bladder nontender  Vaginal: normal mucosa without prolapse or lesions, discharge, grey and frothy  Cervix: normal appearance  Uterus: normal size, shape, position  Adnexa: normal size, nontender bilaterally           Assessment:      1. Vaginal discharge          Plan:      Vaginal discharge  -     Vaginosis Screen by DNA Probe  -      C. trachomatis/N. gonorrhoeae by AMP DNA [facility]; Cervicovaginal  -     metroNIDAZOLE (FLAGYL) 500 MG tablet; Take 1 tablet (500 mg total) by mouth every 12 (twelve) hours. for 7 days  Dispense: 14 tablet; Refill: 0

## 2023-09-16 LAB
BACTERIAL VAGINOSIS DNA: NEGATIVE
C TRACH DNA SPEC QL NAA+PROBE: NOT DETECTED
CANDIDA GLABRATA DNA: NEGATIVE
CANDIDA KRUSEI DNA: NEGATIVE
CANDIDA RRNA VAG QL PROBE: NEGATIVE
N GONORRHOEA DNA SPEC QL NAA+PROBE: NOT DETECTED
T VAGINALIS RRNA GENITAL QL PROBE: POSITIVE

## 2023-09-19 DIAGNOSIS — Z20.2 TRICHOMONAS CONTACT: Primary | ICD-10-CM

## 2023-09-19 RX ORDER — METRONIDAZOLE 500 MG/1
500 TABLET ORAL EVERY 12 HOURS
Qty: 14 TABLET | Refills: 0 | Status: SHIPPED | OUTPATIENT
Start: 2023-09-19 | End: 2023-09-26

## 2023-10-05 ENCOUNTER — OFFICE VISIT (OUTPATIENT)
Dept: INTERNAL MEDICINE | Facility: CLINIC | Age: 28
End: 2023-10-05
Payer: MEDICAID

## 2023-10-05 VITALS
HEIGHT: 64 IN | WEIGHT: 160.06 LBS | BODY MASS INDEX: 27.33 KG/M2 | SYSTOLIC BLOOD PRESSURE: 102 MMHG | HEART RATE: 112 BPM | OXYGEN SATURATION: 98 % | DIASTOLIC BLOOD PRESSURE: 72 MMHG | RESPIRATION RATE: 19 BRPM

## 2023-10-05 DIAGNOSIS — H60.12 CELLULITIS OF LEFT EXTERNAL EAR: Primary | ICD-10-CM

## 2023-10-05 DIAGNOSIS — R42 VERTIGO: ICD-10-CM

## 2023-10-05 PROCEDURE — 3078F DIAST BP <80 MM HG: CPT | Mod: CPTII,,, | Performed by: INTERNAL MEDICINE

## 2023-10-05 PROCEDURE — 3008F PR BODY MASS INDEX (BMI) DOCUMENTED: ICD-10-PCS | Mod: CPTII,,, | Performed by: INTERNAL MEDICINE

## 2023-10-05 PROCEDURE — 99999 PR PBB SHADOW E&M-EST. PATIENT-LVL III: ICD-10-PCS | Mod: PBBFAC,,, | Performed by: INTERNAL MEDICINE

## 2023-10-05 PROCEDURE — 1159F MED LIST DOCD IN RCRD: CPT | Mod: CPTII,,, | Performed by: INTERNAL MEDICINE

## 2023-10-05 PROCEDURE — 3074F SYST BP LT 130 MM HG: CPT | Mod: CPTII,,, | Performed by: INTERNAL MEDICINE

## 2023-10-05 PROCEDURE — 99999 PR PBB SHADOW E&M-EST. PATIENT-LVL III: CPT | Mod: PBBFAC,,, | Performed by: INTERNAL MEDICINE

## 2023-10-05 PROCEDURE — 3078F PR MOST RECENT DIASTOLIC BLOOD PRESSURE < 80 MM HG: ICD-10-PCS | Mod: CPTII,,, | Performed by: INTERNAL MEDICINE

## 2023-10-05 PROCEDURE — 3008F BODY MASS INDEX DOCD: CPT | Mod: CPTII,,, | Performed by: INTERNAL MEDICINE

## 2023-10-05 PROCEDURE — 99213 PR OFFICE/OUTPT VISIT, EST, LEVL III, 20-29 MIN: ICD-10-PCS | Mod: S$PBB,,, | Performed by: INTERNAL MEDICINE

## 2023-10-05 PROCEDURE — 3074F PR MOST RECENT SYSTOLIC BLOOD PRESSURE < 130 MM HG: ICD-10-PCS | Mod: CPTII,,, | Performed by: INTERNAL MEDICINE

## 2023-10-05 PROCEDURE — 1159F PR MEDICATION LIST DOCUMENTED IN MEDICAL RECORD: ICD-10-PCS | Mod: CPTII,,, | Performed by: INTERNAL MEDICINE

## 2023-10-05 PROCEDURE — 99213 OFFICE O/P EST LOW 20 MIN: CPT | Mod: PBBFAC | Performed by: INTERNAL MEDICINE

## 2023-10-05 PROCEDURE — 99213 OFFICE O/P EST LOW 20 MIN: CPT | Mod: S$PBB,,, | Performed by: INTERNAL MEDICINE

## 2023-10-05 RX ORDER — CEPHALEXIN 500 MG/1
500 CAPSULE ORAL 3 TIMES DAILY
Qty: 30 CAPSULE | Refills: 0 | Status: SHIPPED | OUTPATIENT
Start: 2023-10-05 | End: 2023-10-15

## 2023-10-05 RX ORDER — MECLIZINE HYDROCHLORIDE 25 MG/1
25 TABLET ORAL 3 TIMES DAILY PRN
Qty: 20 TABLET | Refills: 0 | Status: SHIPPED | OUTPATIENT
Start: 2023-10-05 | End: 2023-10-25

## 2023-10-25 ENCOUNTER — OFFICE VISIT (OUTPATIENT)
Dept: OBSTETRICS AND GYNECOLOGY | Facility: CLINIC | Age: 28
End: 2023-10-25
Payer: MEDICAID

## 2023-10-25 VITALS
HEIGHT: 64 IN | WEIGHT: 162.25 LBS | HEART RATE: 63 BPM | DIASTOLIC BLOOD PRESSURE: 66 MMHG | BODY MASS INDEX: 27.7 KG/M2 | SYSTOLIC BLOOD PRESSURE: 118 MMHG

## 2023-10-25 DIAGNOSIS — Z20.2 TRICHOMONAS CONTACT: Primary | ICD-10-CM

## 2023-10-25 PROCEDURE — 3074F PR MOST RECENT SYSTOLIC BLOOD PRESSURE < 130 MM HG: ICD-10-PCS | Mod: CPTII,,, | Performed by: STUDENT IN AN ORGANIZED HEALTH CARE EDUCATION/TRAINING PROGRAM

## 2023-10-25 PROCEDURE — 81514 NFCT DS BV&VAGINITIS DNA ALG: CPT | Performed by: STUDENT IN AN ORGANIZED HEALTH CARE EDUCATION/TRAINING PROGRAM

## 2023-10-25 PROCEDURE — 99999 PR PBB SHADOW E&M-EST. PATIENT-LVL III: ICD-10-PCS | Mod: PBBFAC,,, | Performed by: STUDENT IN AN ORGANIZED HEALTH CARE EDUCATION/TRAINING PROGRAM

## 2023-10-25 PROCEDURE — 3008F BODY MASS INDEX DOCD: CPT | Mod: CPTII,,, | Performed by: STUDENT IN AN ORGANIZED HEALTH CARE EDUCATION/TRAINING PROGRAM

## 2023-10-25 PROCEDURE — 99213 OFFICE O/P EST LOW 20 MIN: CPT | Mod: PBBFAC | Performed by: STUDENT IN AN ORGANIZED HEALTH CARE EDUCATION/TRAINING PROGRAM

## 2023-10-25 PROCEDURE — 1159F MED LIST DOCD IN RCRD: CPT | Mod: CPTII,,, | Performed by: STUDENT IN AN ORGANIZED HEALTH CARE EDUCATION/TRAINING PROGRAM

## 2023-10-25 PROCEDURE — 3074F SYST BP LT 130 MM HG: CPT | Mod: CPTII,,, | Performed by: STUDENT IN AN ORGANIZED HEALTH CARE EDUCATION/TRAINING PROGRAM

## 2023-10-25 PROCEDURE — 99213 OFFICE O/P EST LOW 20 MIN: CPT | Mod: S$PBB,,, | Performed by: STUDENT IN AN ORGANIZED HEALTH CARE EDUCATION/TRAINING PROGRAM

## 2023-10-25 PROCEDURE — 99999 PR PBB SHADOW E&M-EST. PATIENT-LVL III: CPT | Mod: PBBFAC,,, | Performed by: STUDENT IN AN ORGANIZED HEALTH CARE EDUCATION/TRAINING PROGRAM

## 2023-10-25 PROCEDURE — 3078F DIAST BP <80 MM HG: CPT | Mod: CPTII,,, | Performed by: STUDENT IN AN ORGANIZED HEALTH CARE EDUCATION/TRAINING PROGRAM

## 2023-10-25 PROCEDURE — 99213 PR OFFICE/OUTPT VISIT, EST, LEVL III, 20-29 MIN: ICD-10-PCS | Mod: S$PBB,,, | Performed by: STUDENT IN AN ORGANIZED HEALTH CARE EDUCATION/TRAINING PROGRAM

## 2023-10-25 PROCEDURE — 1159F PR MEDICATION LIST DOCUMENTED IN MEDICAL RECORD: ICD-10-PCS | Mod: CPTII,,, | Performed by: STUDENT IN AN ORGANIZED HEALTH CARE EDUCATION/TRAINING PROGRAM

## 2023-10-25 PROCEDURE — 3078F PR MOST RECENT DIASTOLIC BLOOD PRESSURE < 80 MM HG: ICD-10-PCS | Mod: CPTII,,, | Performed by: STUDENT IN AN ORGANIZED HEALTH CARE EDUCATION/TRAINING PROGRAM

## 2023-10-25 PROCEDURE — 3008F PR BODY MASS INDEX (BMI) DOCUMENTED: ICD-10-PCS | Mod: CPTII,,, | Performed by: STUDENT IN AN ORGANIZED HEALTH CARE EDUCATION/TRAINING PROGRAM

## 2023-10-25 NOTE — PROGRESS NOTES
Subjective:       Patient ID: Carline Iqbal is a 28 y.o. female.    Chief Complaint:  MIGUEL and Vaginal Discharge      History of Present Illness  Patient is a 27 yo  presenting for re-testing after recent diagnosis of trichomonas. She is reporting some white discharge at this time.     Vaginal Discharge  The patient's primary symptoms include vaginal discharge. Pertinent negatives include no abdominal pain, chills, constipation, diarrhea, fever, headaches, nausea, rash, sore throat or vomiting.         GYN & OB History  Patient's last menstrual period was 10/15/2023.   Date of Last Pap: 3/12/2021    OB History    Para Term  AB Living   1 1 1 0 0 1   SAB IAB Ectopic Multiple Live Births   0 0 0 0 1      # Outcome Date GA Lbr Jeffrey/2nd Weight Sex Delivery Anes PTL Lv   1 Term 22 37w5d 25:34 / 01:16 3.09 kg (6 lb 13 oz) F Vag-Spont EPI Y IWONA       Review of Systems  Review of Systems   Constitutional:  Negative for chills, fatigue, fever and unexpected weight change.   HENT:  Negative for congestion, sinus pain and sore throat.    Eyes:  Negative for visual disturbance.   Respiratory:  Negative for cough, chest tightness and shortness of breath.    Cardiovascular:  Negative for chest pain and palpitations.   Gastrointestinal:  Negative for abdominal pain, constipation, diarrhea, nausea and vomiting.   Genitourinary:  Positive for vaginal discharge. Negative for menstrual problem, vaginal bleeding and vaginal pain.   Musculoskeletal:  Negative for myalgias.   Skin:  Negative for color change, pallor and rash.   Neurological:  Negative for dizziness, light-headedness and headaches.   Psychiatric/Behavioral:  The patient is not nervous/anxious.            Objective:    Physical Exam:   Constitutional: She is oriented to person, place, and time. She appears well-developed and well-nourished. No distress.    HENT:   Head: Normocephalic and atraumatic.    Eyes: Pupils are equal, round, and  reactive to light. EOM are normal.     Cardiovascular:  Normal rate.             Pulmonary/Chest: Effort normal. No respiratory distress.                      Neurological: She is alert and oriented to person, place, and time.     Psychiatric: She has a normal mood and affect. Her behavior is normal. Judgment and thought content normal.          Assessment:        1. Trichomonas contact                Plan:      Carline was seen today for lópez and vaginal discharge.    Diagnoses and all orders for this visit:    Trichomonas contact  -     Vaginosis Screen by DNA Probe

## 2023-11-16 DIAGNOSIS — L20.82 FLEXURAL ECZEMA: ICD-10-CM

## 2023-11-16 NOTE — TELEPHONE ENCOUNTER
No care due was identified.  [facility] Embedded Care Due Messages. Reference number: [id number].   11/16/2023 3:59:18 PM CST

## 2023-11-17 RX ORDER — TRIAMCINOLONE ACETONIDE 1 MG/G
OINTMENT TOPICAL 2 TIMES DAILY
Qty: 30 G | Refills: 0 | Status: SHIPPED | OUTPATIENT
Start: 2023-11-17 | End: 2023-12-29 | Stop reason: SDUPTHER

## 2023-12-29 ENCOUNTER — OFFICE VISIT (OUTPATIENT)
Dept: INTERNAL MEDICINE | Facility: CLINIC | Age: 28
End: 2023-12-29
Payer: MEDICAID

## 2023-12-29 VITALS
HEIGHT: 64 IN | RESPIRATION RATE: 17 BRPM | DIASTOLIC BLOOD PRESSURE: 78 MMHG | SYSTOLIC BLOOD PRESSURE: 117 MMHG | WEIGHT: 164 LBS | BODY MASS INDEX: 28 KG/M2 | HEART RATE: 98 BPM | OXYGEN SATURATION: 97 %

## 2023-12-29 DIAGNOSIS — J40 WHEEZY BRONCHITIS: Primary | ICD-10-CM

## 2023-12-29 DIAGNOSIS — L20.82 FLEXURAL ECZEMA: ICD-10-CM

## 2023-12-29 PROCEDURE — 99999PBSHW PR PBB SHADOW TECHNICAL ONLY FILED TO HB: ICD-10-PCS | Mod: PBBFAC,,,

## 2023-12-29 PROCEDURE — 3074F PR MOST RECENT SYSTOLIC BLOOD PRESSURE < 130 MM HG: ICD-10-PCS | Mod: CPTII,,, | Performed by: FAMILY MEDICINE

## 2023-12-29 PROCEDURE — 3008F PR BODY MASS INDEX (BMI) DOCUMENTED: ICD-10-PCS | Mod: CPTII,,, | Performed by: FAMILY MEDICINE

## 2023-12-29 PROCEDURE — 99999 PR PBB SHADOW E&M-EST. PATIENT-LVL III: ICD-10-PCS | Mod: PBBFAC,,, | Performed by: FAMILY MEDICINE

## 2023-12-29 PROCEDURE — 99214 PR OFFICE/OUTPT VISIT, EST, LEVL IV, 30-39 MIN: ICD-10-PCS | Mod: S$PBB,,, | Performed by: FAMILY MEDICINE

## 2023-12-29 PROCEDURE — 1159F PR MEDICATION LIST DOCUMENTED IN MEDICAL RECORD: ICD-10-PCS | Mod: CPTII,,, | Performed by: FAMILY MEDICINE

## 2023-12-29 PROCEDURE — 3078F PR MOST RECENT DIASTOLIC BLOOD PRESSURE < 80 MM HG: ICD-10-PCS | Mod: CPTII,,, | Performed by: FAMILY MEDICINE

## 2023-12-29 PROCEDURE — 99213 OFFICE O/P EST LOW 20 MIN: CPT | Mod: PBBFAC | Performed by: FAMILY MEDICINE

## 2023-12-29 PROCEDURE — 1159F MED LIST DOCD IN RCRD: CPT | Mod: CPTII,,, | Performed by: FAMILY MEDICINE

## 2023-12-29 PROCEDURE — 1160F RVW MEDS BY RX/DR IN RCRD: CPT | Mod: CPTII,,, | Performed by: FAMILY MEDICINE

## 2023-12-29 PROCEDURE — 3074F SYST BP LT 130 MM HG: CPT | Mod: CPTII,,, | Performed by: FAMILY MEDICINE

## 2023-12-29 PROCEDURE — 3008F BODY MASS INDEX DOCD: CPT | Mod: CPTII,,, | Performed by: FAMILY MEDICINE

## 2023-12-29 PROCEDURE — 96372 THER/PROPH/DIAG INJ SC/IM: CPT | Mod: PBBFAC

## 2023-12-29 PROCEDURE — 3078F DIAST BP <80 MM HG: CPT | Mod: CPTII,,, | Performed by: FAMILY MEDICINE

## 2023-12-29 PROCEDURE — 99999 PR PBB SHADOW E&M-EST. PATIENT-LVL III: CPT | Mod: PBBFAC,,, | Performed by: FAMILY MEDICINE

## 2023-12-29 PROCEDURE — 99214 OFFICE O/P EST MOD 30 MIN: CPT | Mod: S$PBB,,, | Performed by: FAMILY MEDICINE

## 2023-12-29 PROCEDURE — 99999PBSHW PR PBB SHADOW TECHNICAL ONLY FILED TO HB: Mod: PBBFAC,,,

## 2023-12-29 PROCEDURE — 1160F PR REVIEW ALL MEDS BY PRESCRIBER/CLIN PHARMACIST DOCUMENTED: ICD-10-PCS | Mod: CPTII,,, | Performed by: FAMILY MEDICINE

## 2023-12-29 RX ORDER — AZITHROMYCIN 250 MG/1
TABLET, FILM COATED ORAL
Qty: 6 TABLET | Refills: 0 | Status: SHIPPED | OUTPATIENT
Start: 2023-12-29 | End: 2024-01-24 | Stop reason: ALTCHOICE

## 2023-12-29 RX ORDER — TRIAMCINOLONE ACETONIDE 1 MG/G
OINTMENT TOPICAL 2 TIMES DAILY
Qty: 30 G | Refills: 1 | Status: SHIPPED | OUTPATIENT
Start: 2023-12-29 | End: 2024-03-26

## 2023-12-29 RX ORDER — ALBUTEROL SULFATE 0.83 MG/ML
2.5 SOLUTION RESPIRATORY (INHALATION) EVERY 6 HOURS PRN
Qty: 30 EACH | Refills: 0 | Status: SHIPPED | OUTPATIENT
Start: 2023-12-29 | End: 2024-12-28

## 2023-12-29 RX ORDER — METHYLPREDNISOLONE ACETATE 40 MG/ML
60 INJECTION, SUSPENSION INTRA-ARTICULAR; INTRALESIONAL; INTRAMUSCULAR; SOFT TISSUE
Status: COMPLETED | OUTPATIENT
Start: 2023-12-29 | End: 2023-12-29

## 2023-12-29 RX ORDER — GUAIFENESIN AND DEXTROMETHORPHAN HYDROBROMIDE 600; 30 MG/1; MG/1
1 TABLET, EXTENDED RELEASE ORAL 2 TIMES DAILY PRN
Qty: 20 TABLET | Refills: 2 | Status: SHIPPED | OUTPATIENT
Start: 2023-12-29

## 2023-12-29 RX ADMIN — METHYLPREDNISOLONE ACETATE 60 MG: 40 INJECTION, SUSPENSION INTRA-ARTICULAR; INTRALESIONAL; INTRAMUSCULAR; SOFT TISSUE at 02:12

## 2023-12-29 NOTE — PROGRESS NOTES
Subjective:       Patient ID: Carline Iqbal is a 28 y.o. female.    Chief Complaint: Cough and Nasal Congestion    Cough  Associated symptoms include rhinorrhea and wheezing. Pertinent negatives include no chest pain or headaches.     Review of Systems   Constitutional:  Negative for activity change and unexpected weight change.   HENT:  Positive for rhinorrhea. Negative for hearing loss and trouble swallowing.    Eyes:  Negative for discharge and visual disturbance.   Respiratory:  Positive for cough and wheezing. Negative for chest tightness.    Cardiovascular:  Negative for chest pain and palpitations.   Gastrointestinal:  Negative for blood in stool, constipation, diarrhea and vomiting.   Endocrine: Negative for polydipsia and polyuria.   Genitourinary:  Negative for difficulty urinating, dysuria, hematuria and menstrual problem.   Musculoskeletal:  Negative for arthralgias, joint swelling and neck pain.   Neurological:  Negative for weakness and headaches.   Psychiatric/Behavioral:  Negative for confusion and dysphoric mood.        Objective:      Vitals:    12/29/23 1322   BP: 117/78   Pulse: 98   Resp: 17     Physical Exam  Vitals reviewed.   Constitutional:       Comments: mildly tachypneic   HENT:      Head: Normocephalic.      Nose: Nose normal.   Eyes:      General:         Right eye: No discharge.      Pupils: Pupils are equal, round, and reactive to light.   Neck:      Thyroid: No thyromegaly.      Vascular: No JVD.   Cardiovascular:      Rate and Rhythm: Normal rate and regular rhythm.      Pulses: Normal pulses.      Heart sounds: Normal heart sounds. No murmur heard.     No friction rub. No gallop.      Comments: tachycardic  Pulmonary:      Effort: Pulmonary effort is normal.      Breath sounds: Wheezing and rhonchi (exp ronchi) present. No rales.   Abdominal:      Palpations: Abdomen is soft.      Tenderness: There is no abdominal tenderness.   Musculoskeletal:         General: Tenderness  present. Normal range of motion.      Right lower leg: No edema.      Left lower leg: No edema.   Lymphadenopathy:      Cervical: No cervical adenopathy.   Skin:     General: Skin is dry.   Neurological:      General: No focal deficit present.      Mental Status: She is alert and oriented to person, place, and time.      Cranial Nerves: No cranial nerve deficit.      Gait: Gait normal.   Psychiatric:         Mood and Affect: Mood normal.         Behavior: Behavior normal.         Thought Content: Thought content normal.         Judgment: Judgment normal.         Assessment:       1. Wheezy bronchitis    2. Flexural eczema        Plan:   1. Wheezy bronchitis  -     albuterol (PROVENTIL) 2.5 mg /3 mL (0.083 %) nebulizer solution; Take 3 mLs (2.5 mg total) by nebulization every 6 (six) hours as needed for Wheezing.  Dispense: 30 each; Refill: 0  -     azithromycin (Z-RAVINDER) 250 MG tablet; 2 po day 1, then 1 po q day  Dispense: 6 tablet; Refill: 0  -     dextromethorphan-guaiFENesin  mg (MUCINEX DM)  mg per 12 hr tablet; Take 1 tablet by mouth 2 (two) times daily as needed.  Dispense: 20 tablet; Refill: 2  -     methylPREDNISolone acetate injection 60 mg    2. Flexural eczema  -     triamcinolone acetonide 0.1% (KENALOG) 0.1 % ointment; Apply topically 2 (two) times daily.  Dispense: 30 g; Refill: 1

## 2024-01-24 ENCOUNTER — OFFICE VISIT (OUTPATIENT)
Dept: INTERNAL MEDICINE | Facility: CLINIC | Age: 29
End: 2024-01-24
Payer: MEDICAID

## 2024-01-24 ENCOUNTER — PATIENT OUTREACH (OUTPATIENT)
Dept: ADMINISTRATIVE | Facility: HOSPITAL | Age: 29
End: 2024-01-24
Payer: MEDICAID

## 2024-01-24 VITALS
SYSTOLIC BLOOD PRESSURE: 116 MMHG | HEIGHT: 64 IN | OXYGEN SATURATION: 99 % | RESPIRATION RATE: 18 BRPM | BODY MASS INDEX: 27.13 KG/M2 | HEART RATE: 74 BPM | DIASTOLIC BLOOD PRESSURE: 76 MMHG | WEIGHT: 158.94 LBS

## 2024-01-24 DIAGNOSIS — M54.50 ACUTE LEFT-SIDED LOW BACK PAIN WITHOUT SCIATICA: Primary | ICD-10-CM

## 2024-01-24 DIAGNOSIS — S39.012A STRAIN OF LUMBAR REGION, INITIAL ENCOUNTER: ICD-10-CM

## 2024-01-24 DIAGNOSIS — R10.9 ACUTE LEFT FLANK PAIN: ICD-10-CM

## 2024-01-24 PROBLEM — O99.513 ASTHMA AFFECTING PREGNANCY IN THIRD TRIMESTER: Status: RESOLVED | Noted: 2022-01-05 | Resolved: 2024-01-24

## 2024-01-24 PROBLEM — J45.909 ASTHMA AFFECTING PREGNANCY IN THIRD TRIMESTER: Status: RESOLVED | Noted: 2022-01-05 | Resolved: 2024-01-24

## 2024-01-24 LAB
BILIRUB SERPL-MCNC: NORMAL MG/DL
BILIRUB UR QL STRIP: NEGATIVE
BLOOD URINE, POC: NORMAL
CLARITY UR: CLEAR
CLARITY, POC UA: NORMAL
COLOR UR: YELLOW
COLOR, POC UA: NORMAL
GLUCOSE UR QL STRIP: NEGATIVE
GLUCOSE UR QL STRIP: NORMAL
HGB UR QL STRIP: NEGATIVE
KETONES UR QL STRIP: NEGATIVE
KETONES UR QL STRIP: NEGATIVE
LEUKOCYTE ESTERASE UR QL STRIP: NEGATIVE
LEUKOCYTE ESTERASE URINE, POC: NORMAL
NITRITE UR QL STRIP: NEGATIVE
NITRITE, POC UA: NEGATIVE
PH UR STRIP: 6 [PH] (ref 5–8)
PH, POC UA: 5
PROT UR QL STRIP: NEGATIVE
PROTEIN, POC: NORMAL
SP GR UR STRIP: >=1.03 (ref 1–1.03)
SPECIFIC GRAVITY, POC UA: 1.02
URN SPEC COLLECT METH UR: ABNORMAL
UROBILINOGEN UR STRIP-ACNC: NEGATIVE EU/DL
UROBILINOGEN, POC UA: NORMAL

## 2024-01-24 PROCEDURE — 3078F DIAST BP <80 MM HG: CPT | Mod: CPTII,,, | Performed by: NURSE PRACTITIONER

## 2024-01-24 PROCEDURE — 3074F SYST BP LT 130 MM HG: CPT | Mod: CPTII,,, | Performed by: NURSE PRACTITIONER

## 2024-01-24 PROCEDURE — 99999PBSHW POCT URINE DIPSTICK WITHOUT MICROSCOPE: Mod: PBBFAC,,,

## 2024-01-24 PROCEDURE — 99213 OFFICE O/P EST LOW 20 MIN: CPT | Mod: S$PBB,,, | Performed by: NURSE PRACTITIONER

## 2024-01-24 PROCEDURE — 99213 OFFICE O/P EST LOW 20 MIN: CPT | Mod: PBBFAC | Performed by: NURSE PRACTITIONER

## 2024-01-24 PROCEDURE — 81003 URINALYSIS AUTO W/O SCOPE: CPT | Performed by: NURSE PRACTITIONER

## 2024-01-24 PROCEDURE — 3008F BODY MASS INDEX DOCD: CPT | Mod: CPTII,,, | Performed by: NURSE PRACTITIONER

## 2024-01-24 PROCEDURE — 99999 PR PBB SHADOW E&M-EST. PATIENT-LVL III: CPT | Mod: PBBFAC,,, | Performed by: NURSE PRACTITIONER

## 2024-01-24 PROCEDURE — 81002 URINALYSIS NONAUTO W/O SCOPE: CPT | Mod: 59,PBBFAC | Performed by: NURSE PRACTITIONER

## 2024-01-24 PROCEDURE — 1159F MED LIST DOCD IN RCRD: CPT | Mod: CPTII,,, | Performed by: NURSE PRACTITIONER

## 2024-01-24 RX ORDER — NAPROXEN 500 MG/1
500 TABLET ORAL 2 TIMES DAILY PRN
Qty: 60 TABLET | Refills: 0 | Status: SHIPPED | OUTPATIENT
Start: 2024-01-24 | End: 2024-05-31

## 2024-01-24 RX ORDER — METHOCARBAMOL 500 MG/1
500 TABLET, FILM COATED ORAL 2 TIMES DAILY PRN
Qty: 40 TABLET | Refills: 0 | OUTPATIENT
Start: 2024-01-24 | End: 2024-05-31

## 2024-01-24 RX ORDER — PREDNISONE 20 MG/1
20 TABLET ORAL 2 TIMES DAILY
Qty: 6 TABLET | Refills: 0 | Status: SHIPPED | OUTPATIENT
Start: 2024-01-24 | End: 2024-01-27

## 2024-01-24 NOTE — PROGRESS NOTES
Updates were requested from care everywhere.  Health Maintenance has been updated.  [facility] immunization registry triggered.  Immunizations were reconciled.  Per MICHELINE in basket message, pt declined flu vaccine 01/24/2024.

## 2024-01-24 NOTE — PROGRESS NOTES
"Subjective:           Patient ID: Carline Iqbal is a 28 y.o. female.    Chief Complaint: Back Pain (Patient here today for back pain in epidural area onset after childbirth in Jan 2022.  Describes the pain as "a pinch" the same pain as when epidural went in)    Carline Iqbal is a 28 y.o. female, known to me from acute visit;     PMHx asthma, iron deficiency, eczema; here with c/o left side back pain ; she notes intermittent back pain ever since having epidural in 2022  Denies any recent injury   Describes pain as a " pinching"   Localizes pain to left flank area but no CVA tenderness   No fever,   Denies urine symptoms;   Notes one day this week she stayed home and had to lay ' in a ball' to feel better   Activity makes worse    Records reviewed- prior c/o right sided back pain 2/2023   Tried taking ibuprofen and naprosyn; had some relief with naprosyn   Not breast feeding  Denies pregnancy   She denies urinary frequency or burning       Review of Systems   Constitutional:  Negative for chills, fatigue and fever.   HENT:  Negative for congestion, ear pain, postnasal drip, sinus pressure, sneezing and sore throat.    Eyes:  Negative for discharge.   Respiratory:  Negative for cough, chest tightness and shortness of breath.    Cardiovascular: Negative.    Gastrointestinal:  Negative for abdominal pain, constipation, diarrhea, nausea and vomiting.   Genitourinary:  Negative for difficulty urinating, flank pain and hematuria.   Musculoskeletal:  Positive for arthralgias and back pain. Negative for joint swelling.   Skin: Negative.    Neurological:  Negative for dizziness and headaches.   Psychiatric/Behavioral:  Negative for behavioral problems and confusion.        Objective:      Physical Exam  Vitals reviewed.   Constitutional:       General: She is not in acute distress.     Appearance: She is well-developed.   HENT:      Head: Normocephalic and atraumatic.      Right Ear: External ear normal.      Left " Ear: External ear normal.      Nose: Nose normal.   Eyes:      General:         Right eye: No discharge.         Left eye: No discharge.      Extraocular Movements: Extraocular movements intact.      Conjunctiva/sclera: Conjunctivae normal.      Pupils: Pupils are equal, round, and reactive to light.   Neck:      Thyroid: No thyromegaly.   Cardiovascular:      Rate and Rhythm: Normal rate and regular rhythm.      Heart sounds: No murmur heard.  Pulmonary:      Effort: Pulmonary effort is normal. No respiratory distress.      Breath sounds: Normal breath sounds.   Abdominal:      General: Bowel sounds are normal. There is no distension.      Palpations: Abdomen is soft.      Tenderness: There is no abdominal tenderness.   Musculoskeletal:         General: Tenderness present.        Back:       Comments: Localizes pain to left flank area but no CVA tenderness   No rash ,   Straight leg negative    Skin:     General: Skin is warm and dry.   Neurological:      Mental Status: She is alert and oriented to person, place, and time.      Cranial Nerves: No cranial nerve deficit.   Psychiatric:         Behavior: Behavior normal.         Thought Content: Thought content normal.         Assessment:       1. Acute left-sided low back pain without sciatica    2. Acute left flank pain    3. Strain of lumbar region, initial encounter        Plan:   1. Acute left-sided low back pain without sciatica  -     POCT URINE DIPSTICK WITHOUT MICROSCOPE  -     naproxen (NAPROSYN) 500 MG tablet; Take 1 tablet (500 mg total) by mouth 2 (two) times daily as needed (back pain). Take with food and plenty of water  Dispense: 60 tablet; Refill: 0  -     methocarbamoL (ROBAXIN) 500 MG Tab; Take 1 tablet (500 mg total) by mouth 2 (two) times daily as needed (back pain , spasm).  Dispense: 40 tablet; Refill: 0  -     predniSONE (DELTASONE) 20 MG tablet; Take 1 tablet (20 mg total) by mouth 2 (two) times daily. for 3 days  Dispense: 6 tablet; Refill:  0    2. Acute left flank pain  -     POCT URINE DIPSTICK WITHOUT MICROSCOPE  -     Urinalysis, Reflex to Urine Culture Urine, Clean Catch    3. Strain of lumbar region, initial encounter  -     naproxen (NAPROSYN) 500 MG tablet; Take 1 tablet (500 mg total) by mouth 2 (two) times daily as needed (back pain). Take with food and plenty of water  Dispense: 60 tablet; Refill: 0  -     methocarbamoL (ROBAXIN) 500 MG Tab; Take 1 tablet (500 mg total) by mouth 2 (two) times daily as needed (back pain , spasm).  Dispense: 40 tablet; Refill: 0  -     predniSONE (DELTASONE) 20 MG tablet; Take 1 tablet (20 mg total) by mouth 2 (two) times daily. for 3 days  Dispense: 6 tablet; Refill: 0       Discussed hydration   U/A with 1+ leukocyte  + bilirubin  Trace blood   Send for U/A w reflex

## 2024-01-25 NOTE — PROGRESS NOTES
Normal result; reflex urine at hospital was negative so low suspicion for UTI  Tx musculoskeletal back pain with medications I sent as planned

## 2024-03-26 ENCOUNTER — OFFICE VISIT (OUTPATIENT)
Dept: INTERNAL MEDICINE | Facility: CLINIC | Age: 29
End: 2024-03-26
Payer: MEDICAID

## 2024-03-26 VITALS
DIASTOLIC BLOOD PRESSURE: 86 MMHG | WEIGHT: 168 LBS | HEIGHT: 64 IN | RESPIRATION RATE: 20 BRPM | SYSTOLIC BLOOD PRESSURE: 118 MMHG | BODY MASS INDEX: 28.68 KG/M2 | OXYGEN SATURATION: 98 % | HEART RATE: 63 BPM

## 2024-03-26 DIAGNOSIS — H81.12 BENIGN PAROXYSMAL POSITIONAL VERTIGO OF LEFT EAR: Primary | ICD-10-CM

## 2024-03-26 DIAGNOSIS — L30.9 ECZEMA, UNSPECIFIED TYPE: ICD-10-CM

## 2024-03-26 PROCEDURE — 1159F MED LIST DOCD IN RCRD: CPT | Mod: CPTII,,, | Performed by: NURSE PRACTITIONER

## 2024-03-26 PROCEDURE — 3074F SYST BP LT 130 MM HG: CPT | Mod: CPTII,,, | Performed by: NURSE PRACTITIONER

## 2024-03-26 PROCEDURE — 3008F BODY MASS INDEX DOCD: CPT | Mod: CPTII,,, | Performed by: NURSE PRACTITIONER

## 2024-03-26 PROCEDURE — 3079F DIAST BP 80-89 MM HG: CPT | Mod: CPTII,,, | Performed by: NURSE PRACTITIONER

## 2024-03-26 PROCEDURE — 99214 OFFICE O/P EST MOD 30 MIN: CPT | Mod: S$PBB,,, | Performed by: NURSE PRACTITIONER

## 2024-03-26 PROCEDURE — 99999 PR PBB SHADOW E&M-EST. PATIENT-LVL III: CPT | Mod: PBBFAC,,, | Performed by: NURSE PRACTITIONER

## 2024-03-26 PROCEDURE — 99213 OFFICE O/P EST LOW 20 MIN: CPT | Mod: PBBFAC | Performed by: NURSE PRACTITIONER

## 2024-03-26 RX ORDER — PREDNISONE 20 MG/1
20 TABLET ORAL 2 TIMES DAILY
Qty: 8 TABLET | Refills: 0 | Status: SHIPPED | OUTPATIENT
Start: 2024-03-26 | End: 2024-03-30

## 2024-03-26 RX ORDER — MECLIZINE HYDROCHLORIDE 25 MG/1
25 TABLET ORAL 3 TIMES DAILY PRN
Qty: 30 TABLET | Refills: 0 | Status: SHIPPED | OUTPATIENT
Start: 2024-03-26 | End: 2024-06-17

## 2024-03-26 RX ORDER — TRIAMCINOLONE ACETONIDE 1 MG/G
CREAM TOPICAL 2 TIMES DAILY
Qty: 80 G | Refills: 0 | Status: SHIPPED | OUTPATIENT
Start: 2024-03-26

## 2024-03-26 NOTE — PROGRESS NOTES
"Subjective:           Patient ID: Carline Iqbal is a 29 y.o. female.    Chief Complaint: Dizziness (Patient here today for dizziness x 2 days "on Sunday I felt dizzy before I even got out the bed" denies headache, palpitations, and/or sensitivity to light "just dizzy nothing else")    Carline Iqbal is a 29 y.o. female   female, known to me from acute visit;    PMHx asthma, iron deficiency, eczema; intermittent back pain ever since having epidural in 2022  Here with c/o dizziness since Sunday; 3 days  A little better today but worse with position changes and turning to the left    No headache, BP stable       Left abdominal eczema   Noting a flare, thinks its due to different detergent         Review of Systems   Constitutional:  Negative for activity change and unexpected weight change.   HENT:  Negative for hearing loss, rhinorrhea and trouble swallowing.    Eyes:  Negative for discharge and visual disturbance.   Respiratory:  Negative for chest tightness and wheezing.    Cardiovascular:  Negative for chest pain and palpitations.   Gastrointestinal:  Negative for blood in stool, constipation, diarrhea and vomiting.   Endocrine: Negative for polydipsia and polyuria.   Genitourinary:  Negative for difficulty urinating, dysuria, hematuria and menstrual problem.   Musculoskeletal:  Negative for arthralgias, joint swelling and neck pain.   Neurological:  Positive for dizziness and light-headedness. Negative for tremors, syncope, speech difficulty, weakness and headaches.   Psychiatric/Behavioral:  Negative for confusion and dysphoric mood.        Objective:      Physical Exam  Vitals and nursing note reviewed.   Constitutional:       General: She is not in acute distress.     Appearance: She is well-developed.   HENT:      Head: Normocephalic and atraumatic.      Ears:      Comments: Slight fluid to left   Eyes:      Pupils: Pupils are equal, round, and reactive to light.   Neck:      Thyroid: No " thyromegaly.   Cardiovascular:      Rate and Rhythm: Normal rate and regular rhythm.      Heart sounds: Normal heart sounds. No murmur heard.  Pulmonary:      Effort: Pulmonary effort is normal. No respiratory distress.      Breath sounds: Normal breath sounds. No wheezing or rales.   Abdominal:      General: Bowel sounds are normal. There is no distension.      Palpations: Abdomen is soft. There is no mass.      Tenderness: There is no abdominal tenderness.   Musculoskeletal:         General: No deformity. Normal range of motion.   Lymphadenopathy:      Cervical: No cervical adenopathy.   Skin:     General: Skin is warm and dry.      Findings: Rash present. No erythema.      Comments: Diffuse Left abdominal eczema   Noting a flare   Neurological:      Mental Status: She is alert and oriented to person, place, and time.      Cranial Nerves: No cranial nerve deficit.      Sensory: No sensory deficit.      Comments: CN II visual fields full to confrontation  CN III, Iv, VI- pupils ERRL   CN III- no palsy  Nystagmus; none   Diplopia- none  ophthalmoparesis- none  CN V- facial sensation intact  CN VII- facial expression full and symmetric  CNVIII- normal  CN IV-Normal  CN X- normal  CN XI- Normal   CN XII normal     [place] HP negative  Performed Epley maneuver          Assessment:       1. Benign paroxysmal positional vertigo of left ear    2. Eczema, unspecified type        Plan:   1. Benign paroxysmal positional vertigo of left ear  -     meclizine (ANTIVERT) 25 mg tablet; Take 1 tablet (25 mg total) by mouth 3 (three) times daily as needed for Dizziness.  Dispense: 30 tablet; Refill: 0    2. Eczema, unspecified type  -     triamcinolone acetonide 0.1% (KENALOG) 0.1 % cream; Apply topically 2 (two) times daily.  Dispense: 80 g; Refill: 0  -     predniSONE (DELTASONE) 20 MG tablet; Take 1 tablet (20 mg total) by mouth 2 (two) times daily. for 4 days  Dispense: 8 tablet; Refill: 0

## 2024-03-27 ENCOUNTER — OFFICE VISIT (OUTPATIENT)
Dept: OBSTETRICS AND GYNECOLOGY | Facility: CLINIC | Age: 29
End: 2024-03-27
Payer: MEDICAID

## 2024-03-27 VITALS
HEART RATE: 68 BPM | BODY MASS INDEX: 28.34 KG/M2 | SYSTOLIC BLOOD PRESSURE: 124 MMHG | WEIGHT: 166 LBS | HEIGHT: 64 IN | DIASTOLIC BLOOD PRESSURE: 78 MMHG

## 2024-03-27 DIAGNOSIS — Z12.4 PAP SMEAR FOR CERVICAL CANCER SCREENING: ICD-10-CM

## 2024-03-27 DIAGNOSIS — Z01.419 ENCNTR FOR GYN EXAM (GENERAL) (ROUTINE) W/O ABN FINDINGS: Primary | ICD-10-CM

## 2024-03-27 PROCEDURE — 99213 OFFICE O/P EST LOW 20 MIN: CPT | Mod: PBBFAC | Performed by: STUDENT IN AN ORGANIZED HEALTH CARE EDUCATION/TRAINING PROGRAM

## 2024-03-27 PROCEDURE — 3074F SYST BP LT 130 MM HG: CPT | Mod: CPTII,,, | Performed by: STUDENT IN AN ORGANIZED HEALTH CARE EDUCATION/TRAINING PROGRAM

## 2024-03-27 PROCEDURE — 99395 PREV VISIT EST AGE 18-39: CPT | Mod: S$PBB,,, | Performed by: STUDENT IN AN ORGANIZED HEALTH CARE EDUCATION/TRAINING PROGRAM

## 2024-03-27 PROCEDURE — 87624 HPV HI-RISK TYP POOLED RSLT: CPT | Performed by: STUDENT IN AN ORGANIZED HEALTH CARE EDUCATION/TRAINING PROGRAM

## 2024-03-27 PROCEDURE — 1159F MED LIST DOCD IN RCRD: CPT | Mod: CPTII,,, | Performed by: STUDENT IN AN ORGANIZED HEALTH CARE EDUCATION/TRAINING PROGRAM

## 2024-03-27 PROCEDURE — 3078F DIAST BP <80 MM HG: CPT | Mod: CPTII,,, | Performed by: STUDENT IN AN ORGANIZED HEALTH CARE EDUCATION/TRAINING PROGRAM

## 2024-03-27 PROCEDURE — 99999 PR PBB SHADOW E&M-EST. PATIENT-LVL III: CPT | Mod: PBBFAC,,, | Performed by: STUDENT IN AN ORGANIZED HEALTH CARE EDUCATION/TRAINING PROGRAM

## 2024-03-27 PROCEDURE — 88175 CYTOPATH C/V AUTO FLUID REDO: CPT | Performed by: STUDENT IN AN ORGANIZED HEALTH CARE EDUCATION/TRAINING PROGRAM

## 2024-03-27 PROCEDURE — 3008F BODY MASS INDEX DOCD: CPT | Mod: CPTII,,, | Performed by: STUDENT IN AN ORGANIZED HEALTH CARE EDUCATION/TRAINING PROGRAM

## 2024-03-27 PROCEDURE — 1160F RVW MEDS BY RX/DR IN RCRD: CPT | Mod: CPTII,,, | Performed by: STUDENT IN AN ORGANIZED HEALTH CARE EDUCATION/TRAINING PROGRAM

## 2024-03-27 NOTE — PROGRESS NOTES
Subjective:    Patient ID: Carline Iqbal is a 29 y.o. y.o. female.     Chief Complaint: Annual Well Woman Exam     History of Present Illness:  Carline presents today for Annual Well Woman exam. She describes her menses as regular every month without intermenstrual spotting and this past month it came twice .She denies pelvic pain.  She denies breast tenderness, masses, nipple discharge. She denies difficulty with urination or bowel movements. She is sexually active. Contraception is by condoms.      Menstrual History:   Patient's last menstrual period was 2024..     OB History          1    Para   1    Term   1       0    AB   0    Living   1         SAB   0    IAB   0    Ectopic   0    Multiple   0    Live Births   1                 The following portions of the patient's history were reviewed and updated as appropriate: allergies, current medications, past family history, past medical history, past social history, past surgical history, and problem list.    ROS:   CONSTITUTIONAL: Negative for fever, chills, diaphoresis, weakness, fatigue, weight loss, weight gain  ENT: negative for sore throat, nasal congestion, nasal discharge, epistaxis, tinnitus, hearing loss  EYES: negative for blurry vision, decreased vision, loss of vision, eye pain, diplopia, photophobia, discharge  SKIN: Negative for rash, itching, hives  RESPIRATORY: negative for cough, hemoptysis, shortness of breath, pleuritic chest pain, wheezing  CARDIOVASCULAR: negative for chest pain, dyspnea on exertion, orthopnea, paroxysmal nocturnal dyspnea, edema, palpitations  BREAST: negative for breast  tenderness, breast mass, nipple discharge, or skin changes  GASTROINTESTINAL: negative for abdominal pain, flank pain, nausea, vomiting, diarrhea, constipation, black stool, blood in stool  GENITOURINARY: negative for abnormal vaginal bleeding, amenorrhea, decreased libido, dysuria, genital sores, hematuria, incontinence,  menorrhagia, pelvic pain, urinary frequency, vaginal discharge  HEMATOLOGIC/LYMPHATIC: negative for swollen lymph nodes, bleeding, bruising  MUSCULOSKELETAL: negative for back pain, joint pain, joint stiffness, joint swelling, muscle pain, muscle weakness  NEUROLOGICAL: negative for dizzy/vertigo, headache, focal weakness, numbness/tingling, speech problems, loss of consciousness, confusion, memory loss  BEHAVORIAL/PSYCH: negative for anxiety, depression, psychosis  ENDOCRINE: negative for polydipsia/polyuria, palpitations, skin changes, temperature intolerance, unexpected weight changes  ALLERGIC/IMMUNOLOGIC: negative for urticaria, hay fever, angioedema      Objective:    Vital Signs:  Vitals:    03/27/24 1023   BP: 124/78   Pulse: 68       Physical Exam:  General:  alert, cooperative, no distress   Skin:  Skin color, texture, turgor normal. No rashes or lesions   HEENT:  conjunctivae/corneas clear. PERRL.   Neck: supple, trachea midline, no adenopathy or thyromegally   Respiratory:  Normal effort   Breasts:  no discharge, erythema, tenderness, or palpable masses; no axillary lymphadenopathy   Abdomen:  soft, nontender, no palpable masses   Pelvis: External genitalia: normal general appearance  Urinary system: urethral meatus normal, bladder nontender  Vaginal: normal mucosa without prolapse or lesions  Cervix: normal appearance  Uterus: normal size, shape, position  Adnexa: normal size, nontender bilaterally   Extremities: Normal ROM; no edema, no cyanosis   Neurologial: Normal strength and tone. No focal numbness or weakness.   Psychiatric: normal mood, speech, dress, and thought processes         Assessment:       Healthy female exam.     1. Encntr for gyn exam (general) (routine) w/o abn findings    2. Pap smear for cervical cancer screening          Plan:      Problem List Items Addressed This Visit    None  Visit Diagnoses       Encntr for gyn exam (general) (routine) w/o abn findings    -  Primary    Pap  smear for cervical cancer screening        Relevant Orders    HPV High Risk Genotypes, PCR    Liquid-Based Pap Smear, Screening              COUNSELING:  Carline was counseled on STD prevention, use and side-effects of various contraceptive measures, A.C.O.G. Pap guidelines and recommendations for yearly pelvic exams in addition to recommendations for monthly self breast exams; to see her PCP for other health maintenance.

## 2024-03-28 LAB
HPV HR 12 DNA SPEC QL NAA+PROBE: NEGATIVE
HPV16 AG SPEC QL: NEGATIVE
HPV18 DNA SPEC QL NAA+PROBE: NEGATIVE

## 2024-04-03 LAB
FINAL PATHOLOGIC DIAGNOSIS: NORMAL
Lab: NORMAL

## 2024-05-31 ENCOUNTER — HOSPITAL ENCOUNTER (EMERGENCY)
Facility: HOSPITAL | Age: 29
Discharge: HOME | End: 2024-05-31
Attending: EMERGENCY MEDICINE
Payer: MEDICAID

## 2024-05-31 VITALS
HEIGHT: 64 IN | OXYGEN SATURATION: 97 % | DIASTOLIC BLOOD PRESSURE: 78 MMHG | SYSTOLIC BLOOD PRESSURE: 156 MMHG | BODY MASS INDEX: 29.06 KG/M2 | HEART RATE: 85 BPM | RESPIRATION RATE: 19 BRPM | TEMPERATURE: 99 F | WEIGHT: 170.19 LBS

## 2024-05-31 DIAGNOSIS — R07.9 CHEST PAIN: ICD-10-CM

## 2024-05-31 DIAGNOSIS — R07.89 CHEST DISCOMFORT: ICD-10-CM

## 2024-05-31 DIAGNOSIS — M94.0 COSTOCHONDRITIS: Primary | ICD-10-CM

## 2024-05-31 LAB
OHS QRS DURATION: 84 MS
OHS QTC CALCULATION: 410 MS

## 2024-05-31 PROCEDURE — 99284 EMERGENCY DEPT VISIT MOD MDM: CPT | Mod: 25

## 2024-05-31 PROCEDURE — 93005 ELECTROCARDIOGRAM TRACING: CPT

## 2024-05-31 PROCEDURE — 93010 ELECTROCARDIOGRAM REPORT: CPT | Mod: ,,, | Performed by: INTERNAL MEDICINE

## 2024-05-31 PROCEDURE — 25000003 PHARM REV CODE 250

## 2024-05-31 RX ORDER — PANTOPRAZOLE SODIUM 40 MG/1
40 TABLET, DELAYED RELEASE ORAL DAILY
Qty: 14 TABLET | Refills: 0 | Status: SHIPPED | OUTPATIENT
Start: 2024-05-31 | End: 2024-06-17

## 2024-05-31 RX ORDER — LIDOCAINE HYDROCHLORIDE 20 MG/ML
15 SOLUTION OROPHARYNGEAL
Status: COMPLETED | OUTPATIENT
Start: 2024-05-31 | End: 2024-05-31

## 2024-05-31 RX ORDER — ALUMINUM HYDROXIDE, MAGNESIUM HYDROXIDE, AND SIMETHICONE 1200; 120; 1200 MG/30ML; MG/30ML; MG/30ML
30 SUSPENSION ORAL
Status: COMPLETED | OUTPATIENT
Start: 2024-05-31 | End: 2024-05-31

## 2024-05-31 RX ORDER — METHOCARBAMOL 500 MG/1
1000 TABLET, FILM COATED ORAL 3 TIMES DAILY
Qty: 30 TABLET | Refills: 0 | Status: SHIPPED | OUTPATIENT
Start: 2024-05-31 | End: 2024-06-05

## 2024-05-31 RX ORDER — IBUPROFEN 800 MG/1
800 TABLET ORAL 3 TIMES DAILY PRN
Qty: 21 TABLET | Refills: 0 | Status: SHIPPED | OUTPATIENT
Start: 2024-05-31

## 2024-05-31 RX ADMIN — ALUMINUM HYDROXIDE, MAGNESIUM HYDROXIDE, AND SIMETHICONE 30 ML: 200; 200; 20 SUSPENSION ORAL at 11:05

## 2024-05-31 RX ADMIN — LIDOCAINE HYDROCHLORIDE 15 ML: 20 SOLUTION ORAL at 11:05

## 2024-05-31 NOTE — ED PROVIDER NOTES
Encounter Date: 5/31/2024       History     Chief Complaint   Patient presents with    Chest Pain     This note is dictated on M*Modal word recognition program.  There are word recognition mistakes and grammatical errors that are occasionally missed on review.       Carline Iqbal is a 29 y.o. female  Presents  to er today  with complaints of left chest wall pain  approximately 30 minutes after eating lunch.  Patient reports the pain as a aching type pain.  She reports a GI cocktail given in triage did help the pain somewhat.  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      The history is provided by the patient.     Review of patient's allergies indicates:  No Known Allergies  Past Medical History:   Diagnosis Date    Asthma     Asthma affecting pregnancy in third trimester 01/05/2022    Eczema     Pilonidal cyst 04/2016     Past Surgical History:   Procedure Laterality Date    pilonidal cyst removal      TYMPANOSTOMY TUBE PLACEMENT Bilateral      Family History   Problem Relation Name Age of Onset    Hypertension Mother Yesenia Morris     Asthma Mother Yesenia [place]     Hypertension Father Ken Iqbal     Diabetes Maternal Grandmother Stella Meliton     Breast cancer Maternal Grandmother Stella Meliton 28    Stroke Maternal Grandmother Stella Meliton     Cancer Maternal Grandmother Stella Meliton         breast and ovarian    Stroke Maternal Grandfather Mono Morris     Asthma Maternal Grandfather Mono Morris     COPD Maternal Grandfather Mono [place]     Cancer Paternal Grandmother Jazmine Iqbal         throat    Anesthesia problems Neg Hx      Colon cancer Neg Hx      Ovarian cancer Neg Hx      Heart disease Neg Hx       Social History     Tobacco Use    Smoking status: Never     Passive exposure: Never    Smokeless tobacco: Never   Substance Use Topics    Alcohol use: Yes     Comment: Once a month    Drug use: No     Review  of Systems   Constitutional: Negative.    HENT: Negative.     Eyes: Negative.    Respiratory: Negative.     Cardiovascular:  Positive for chest pain.   All other systems reviewed and are negative.      Physical Exam     Initial Vitals [05/31/24 1154]   BP Pulse Resp Temp SpO2   (!) 156/78 85 19 98.9 °F (37.2 °C) 97 %      MAP       --         Physical Exam    Constitutional: She appears well-developed and well-nourished. She is not diaphoretic. No distress.   HENT:   Right Ear: External ear normal.   Left Ear: External ear normal.   Eyes: Pupils are equal, round, and reactive to light. Right eye exhibits no discharge.   Neck: Neck supple.   Normal range of motion.  Cardiovascular:  Normal rate and regular rhythm.           No murmur heard.  Pulmonary/Chest: Breath sounds normal. No respiratory distress. She has no wheezes.   Abdominal: Abdomen is soft.   Musculoskeletal:         General: No tenderness or edema.      Cervical back: Normal range of motion and neck supple.     Neurological: She is alert and oriented to person, place, and time.   Skin: Skin is warm. Capillary refill takes less than 2 seconds.   Psychiatric: She has a normal mood and affect. Thought content normal.         ED Course   Procedures  Labs Reviewed - No data to display  EKG Readings: (Independently Interpreted)   Initial Reading: No STEMI. Rhythm: Normal Sinus Rhythm. Heart Rate: 79. Ectopy: No Ectopy. Axis: Normal. Clinical Impression: Normal Sinus Rhythm     ECG Results              EKG 12-lead (Final result)        Collection Time Result Time QRS Duration OHS QTC Calculation    05/31/24 11:55:02 05/31/24 13:05:34 84 410                     Final result by Interface, Lab In [facility] (05/31/24 13:05:42)                   Narrative:    Test Reason : CHEST PAIN    Vent. Rate : 079 BPM     Atrial Rate : 079 BPM     P-R Int : 188 ms          QRS Dur : 084 ms      QT Int : 358 ms       P-R-T Axes : 053 055 003 degrees     QTc Int : 410  ms    Normal sinus rhythm  Biatrial enlargement  T wave abnormality, consider inferior ischemia  Abnormal ECG  When compared with ECG of 29-DEC-2021 19:27,  No significant change was found  Confirmed by Gilbert MARTINEZ, Juan Carlos CRAMER ([id number]) on 5/31/2024 1:05:33 PM    Referred By: MELINDA   SELF           Confirmed By:Juan Carlos Hunt MD                                  Imaging Results              X-Ray Chest 1 View (Final result)  Result time 05/31/24 12:13:23      Final result by Christin Chappell MD (05/31/24 12:13:23)                   Impression:      No acute abnormality.      Electronically signed by: Christin Chappell MD  Date:    05/31/2024  Time:    12:13               Narrative:    EXAMINATION:  XR CHEST 1 VIEW    CLINICAL HISTORY:  Other chest pain    TECHNIQUE:  Single frontal view of the chest was performed.    COMPARISON:  03/08/2018    FINDINGS:  The lungs are clear with normal appearance of pulmonary vasculature. No pleural effusion. No evident pneumothorax.    The cardiac silhouette is normal in size. The hilar and mediastinal contours are unremarkable.    Bones are intact.                                       Medications   aluminum-magnesium hydroxide-simethicone 200-200-20 mg/5 mL suspension 30 mL (30 mLs Oral Given 5/31/24 1159)     And   LIDOcaine viscous HCl 2% oral solution 15 mL (15 mLs Oral Given 5/31/24 1159)     Medical Decision Making    Differential diagnosis includes costochondritis, GERD, indigestion, cardiac dysrhythmia     Patient's symptoms most consistent with costochondritis versus GERD.    X-ray of chest reveals no acute cardiopulmonary abnormality   EKG reveals normal sinus rhythm.    Patient has a heart score of 0.    Patient advised to follow up with PCP for further investigation and treatment of symptoms.  Patient stable at time of discharge in no acute distress.  No life-threatening illnesses were found during ER visit today.  Patient was instructed to follow-up with PCP or other  recommended specialist within the next 48-72 hours.  Patient was instructed to return to ER immediately for any worsening or concerning symptoms.  All discharge instructions discussed with patient, and patient agrees to comply with discharge instructions given today.     Amount and/or Complexity of Data Reviewed  Radiology: ordered.    Risk  OTC drugs.  Prescription drug management.                                      Clinical Impression:  Final diagnoses:  [R07.9] Chest pain  [R07.89] Chest discomfort  [M94.0] Costochondritis (Primary)          ED Disposition Condition    Discharge Stable          ED Prescriptions       Medication Sig Dispense Start Date End Date Auth. Provider    ibuprofen (ADVIL,MOTRIN) 800 MG tablet Take 1 tablet (800 mg total) by mouth 3 (three) times daily as needed for Pain. 21 tablet 5/31/2024 -- Navneet Flores NP    methocarbamoL (ROBAXIN) 500 MG Tab Take 2 tablets (1,000 mg total) by mouth 3 (three) times daily. for 5 days 30 tablet 5/31/2024 6/5/2024 Navneet Flores NP    pantoprazole (PROTONIX) 40 MG tablet Take 1 tablet (40 mg total) by mouth once daily. for 14 days 14 tablet 5/31/2024 6/14/2024 Navneet Flores NP          Follow-up Information       Follow up With Specialties Details Why Contact Info    Vickie Chicas MD Internal Medicine Schedule an appointment as soon as possible for a visit in 3 days  [address] [place] [id number]  [phone]               Navneet Flores NP  05/31/24 4905

## 2024-05-31 NOTE — DISCHARGE INSTRUCTIONS
Please follow-up with your primary care provider.  Do hesitate to return to ER immediately if you develop any worsening or concerning symptoms.

## 2024-05-31 NOTE — ED TRIAGE NOTES
C/o non radiating mid sternal chest pain that began 10 minutes ago. Patient ate rice and fried fish approximately 20 minutes ago.

## 2024-06-17 ENCOUNTER — OFFICE VISIT (OUTPATIENT)
Dept: INTERNAL MEDICINE | Facility: CLINIC | Age: 29
End: 2024-06-17
Payer: MEDICAID

## 2024-06-17 VITALS
HEIGHT: 64 IN | DIASTOLIC BLOOD PRESSURE: 88 MMHG | OXYGEN SATURATION: 99 % | SYSTOLIC BLOOD PRESSURE: 128 MMHG | HEART RATE: 81 BPM | WEIGHT: 169.56 LBS | RESPIRATION RATE: 18 BRPM | BODY MASS INDEX: 28.95 KG/M2

## 2024-06-17 DIAGNOSIS — L73.2 HYDRADENITIS: Primary | ICD-10-CM

## 2024-06-17 PROCEDURE — 99213 OFFICE O/P EST LOW 20 MIN: CPT | Mod: PBBFAC | Performed by: NURSE PRACTITIONER

## 2024-06-17 PROCEDURE — 99999 PR PBB SHADOW E&M-EST. PATIENT-LVL III: CPT | Mod: PBBFAC,,, | Performed by: NURSE PRACTITIONER

## 2024-06-17 PROCEDURE — 3008F BODY MASS INDEX DOCD: CPT | Mod: CPTII,,, | Performed by: NURSE PRACTITIONER

## 2024-06-17 PROCEDURE — 99213 OFFICE O/P EST LOW 20 MIN: CPT | Mod: S$PBB,,, | Performed by: NURSE PRACTITIONER

## 2024-06-17 PROCEDURE — 3074F SYST BP LT 130 MM HG: CPT | Mod: CPTII,,, | Performed by: NURSE PRACTITIONER

## 2024-06-17 PROCEDURE — 3079F DIAST BP 80-89 MM HG: CPT | Mod: CPTII,,, | Performed by: NURSE PRACTITIONER

## 2024-06-17 PROCEDURE — 1159F MED LIST DOCD IN RCRD: CPT | Mod: CPTII,,, | Performed by: NURSE PRACTITIONER

## 2024-06-17 RX ORDER — DOXYCYCLINE 100 MG/1
100 CAPSULE ORAL 2 TIMES DAILY
Qty: 60 CAPSULE | Refills: 0 | Status: SHIPPED | OUTPATIENT
Start: 2024-06-17

## 2024-06-17 RX ORDER — METHOCARBAMOL 500 MG/1
500 TABLET, FILM COATED ORAL 2 TIMES DAILY PRN
COMMUNITY

## 2024-06-17 NOTE — PROGRESS NOTES
Subjective:       Patient ID: Carline Iqbal is a 29 y.o. female.    Chief Complaint: Groin Swelling, Axillary Swelling, Follow-up (ER), and Chest Pain    HPI: Pt presents to clinic today new to me but known to Dr Chicas with c/o swelling in axilla and in groin. Usually has boils they usually bust or resolve on there own. She report that she has had them come and go for the last 2 years. Had one lanced in ER in past.       Review of Systems   Constitutional:  Negative for activity change and unexpected weight change.   HENT:  Negative for hearing loss, rhinorrhea and trouble swallowing.    Eyes:  Negative for discharge and visual disturbance.   Respiratory:  Negative for chest tightness and wheezing.    Cardiovascular:  Negative for chest pain and palpitations.   Gastrointestinal:  Negative for blood in stool, constipation, diarrhea and vomiting.   Endocrine: Negative for polydipsia and polyuria.   Genitourinary:  Negative for difficulty urinating, dysuria, hematuria and menstrual problem.   Musculoskeletal:  Negative for arthralgias, joint swelling and neck pain.   Skin:  Positive for wound.   Neurological:  Negative for weakness and headaches.   Psychiatric/Behavioral:  Negative for confusion and dysphoric mood.        Objective:      Physical Exam  Vitals and nursing note reviewed.   Constitutional:       Appearance: Normal appearance.   HENT:      Head: Normocephalic and atraumatic.   Cardiovascular:      Rate and Rhythm: Normal rate and regular rhythm.      Heart sounds: No murmur heard.  Pulmonary:      Effort: Pulmonary effort is normal. No respiratory distress.      Breath sounds: Normal breath sounds. No wheezing.   Abdominal:      General: There is no distension.      Palpations: Abdomen is soft.   Skin:     General: Skin is warm and dry.      Comments: Left axilla boil noted no redness but has smooth abscess with soft center noted about hermes sized and has scarring in right axilla from previous  eren.          Assessment:       1. Hydradenitis        Plan:     Problem List Items Addressed This Visit    None  Visit Diagnoses       Hydradenitis    -  Primary    Relevant Orders    Ambulatory referral/consult to Dermatology            Start doxy BID and f/u derm for long term treatment options

## 2024-06-27 ENCOUNTER — LAB VISIT (OUTPATIENT)
Dept: LAB | Facility: HOSPITAL | Age: 29
End: 2024-06-27
Attending: OBSTETRICS & GYNECOLOGY
Payer: MEDICAID

## 2024-06-27 ENCOUNTER — OFFICE VISIT (OUTPATIENT)
Dept: OBSTETRICS AND GYNECOLOGY | Facility: CLINIC | Age: 29
End: 2024-06-27
Payer: MEDICAID

## 2024-06-27 VITALS
SYSTOLIC BLOOD PRESSURE: 122 MMHG | DIASTOLIC BLOOD PRESSURE: 80 MMHG | WEIGHT: 170.31 LBS | OXYGEN SATURATION: 99 % | RESPIRATION RATE: 16 BRPM | BODY MASS INDEX: 29.08 KG/M2 | HEIGHT: 64 IN | HEART RATE: 90 BPM

## 2024-06-27 DIAGNOSIS — Z11.3 SCREEN FOR STD (SEXUALLY TRANSMITTED DISEASE): ICD-10-CM

## 2024-06-27 DIAGNOSIS — Z11.3 SCREEN FOR STD (SEXUALLY TRANSMITTED DISEASE): Primary | ICD-10-CM

## 2024-06-27 LAB
HBV SURFACE AG SERPL QL IA: NORMAL
HCV AB SERPL QL IA: NORMAL
HIV 1+2 AB+HIV1 P24 AG SERPL QL IA: NORMAL
TREPONEMA PALLIDUM IGG+IGM AB [PRESENCE] IN SERUM OR PLASMA BY IMMUNOASSAY: NONREACTIVE

## 2024-06-27 PROCEDURE — 86593 SYPHILIS TEST NON-TREP QUANT: CPT | Performed by: OBSTETRICS & GYNECOLOGY

## 2024-06-27 PROCEDURE — 36415 COLL VENOUS BLD VENIPUNCTURE: CPT | Performed by: OBSTETRICS & GYNECOLOGY

## 2024-06-27 PROCEDURE — 1159F MED LIST DOCD IN RCRD: CPT | Mod: CPTII,,, | Performed by: OBSTETRICS & GYNECOLOGY

## 2024-06-27 PROCEDURE — 86803 HEPATITIS C AB TEST: CPT | Performed by: OBSTETRICS & GYNECOLOGY

## 2024-06-27 PROCEDURE — 86592 SYPHILIS TEST NON-TREP QUAL: CPT | Performed by: OBSTETRICS & GYNECOLOGY

## 2024-06-27 PROCEDURE — 87340 HEPATITIS B SURFACE AG IA: CPT | Performed by: OBSTETRICS & GYNECOLOGY

## 2024-06-27 PROCEDURE — 3008F BODY MASS INDEX DOCD: CPT | Mod: CPTII,,, | Performed by: OBSTETRICS & GYNECOLOGY

## 2024-06-27 PROCEDURE — 99213 OFFICE O/P EST LOW 20 MIN: CPT | Mod: PBBFAC | Performed by: OBSTETRICS & GYNECOLOGY

## 2024-06-27 PROCEDURE — 87491 CHLMYD TRACH DNA AMP PROBE: CPT | Performed by: OBSTETRICS & GYNECOLOGY

## 2024-06-27 PROCEDURE — 87389 HIV-1 AG W/HIV-1&-2 AB AG IA: CPT | Performed by: OBSTETRICS & GYNECOLOGY

## 2024-06-27 PROCEDURE — 99999 PR PBB SHADOW E&M-EST. PATIENT-LVL III: CPT | Mod: PBBFAC,,, | Performed by: OBSTETRICS & GYNECOLOGY

## 2024-06-27 PROCEDURE — 99213 OFFICE O/P EST LOW 20 MIN: CPT | Mod: S$PBB,,, | Performed by: OBSTETRICS & GYNECOLOGY

## 2024-06-27 PROCEDURE — 3079F DIAST BP 80-89 MM HG: CPT | Mod: CPTII,,, | Performed by: OBSTETRICS & GYNECOLOGY

## 2024-06-27 PROCEDURE — 3074F SYST BP LT 130 MM HG: CPT | Mod: CPTII,,, | Performed by: OBSTETRICS & GYNECOLOGY

## 2024-06-27 NOTE — PROGRESS NOTES
Subjective:    Patient ID: Carline Iqbal is a 29 y.o. y.o. female.     Chief Complaint:   Chief Complaint   Patient presents with    STD CHECK       History of Present Illness:  Carline presents today complaining of white vaginal discharge. Denies any itching, odor or irritation. Desires blood work.       ROS:   Review of Systems   Constitutional:  Negative for activity change, appetite change, chills, diaphoresis, fatigue, fever and unexpected weight change.   HENT:  Negative for mouth sores and tinnitus.    Eyes:  Negative for discharge and visual disturbance.   Respiratory:  Negative for cough, shortness of breath and wheezing.    Cardiovascular:  Negative for chest pain, palpitations and leg swelling.   Gastrointestinal:  Negative for abdominal pain, bloating, blood in stool, constipation, diarrhea, nausea and vomiting.   Endocrine: Negative for diabetes, hair loss, hot flashes, hyperthyroidism and hypothyroidism.   Genitourinary:  Positive for vaginal discharge. Negative for dysuria, flank pain, frequency, genital sores, hematuria, urgency, vaginal bleeding, vaginal pain, postcoital bleeding and vaginal odor.   Musculoskeletal:  Negative for back pain, joint swelling and myalgias.   Integumentary:  Negative for rash, acne, breast mass, nipple discharge and breast skin changes.   Neurological:  Negative for seizures, syncope, numbness and headaches.   Hematological:  Negative for adenopathy. Does not bruise/bleed easily.   Psychiatric/Behavioral:  Negative for depression and sleep disturbance. The patient is not nervous/anxious.    Breast: Negative for mass, mastodynia, nipple discharge and skin changes        Objective:    Vital Signs:  Vitals:    06/27/24 1419   BP: 122/80   Pulse: 90   Resp: 16       Physical Exam:  General:  alert, cooperative, no distress   Head Normocephalic, without obvious abnormality, atraumatic   Neck .supple, symmetrical, trachea midline   Skin:  Skin color, texture, turgor  normal. No rashes or lesions   Abdomen:  soft, non-tender; bowel sounds normal   Pelvis: External genitalia: normal general appearance  Urinary system: urethral meatus normal, bladder nontender  Vaginal: normal mucosa without prolapse or lesions, discharge, white  Cervix: normal appearance  Uterus: normal size, shape, position  Adnexa: normal size, nontender bilaterally   Extremities extremities normal, atraumatic, no cyanosis or edema   Neurologic Grossly normal          Problem List Items Addressed This Visit    None  Visit Diagnoses       Screen for STD (sexually transmitted disease)    -  Primary    Relevant Orders    Hepatitis C Antibody    Hepatitis B Surface Antigen    RPR    HIV 1/2 Ag/Ab (4th Gen)    Treponema Pallidium Antibodies IgG, IgM    Vaginosis Screen by DNA Probe    C. trachomatis/N. gonorrhoeae by AMP DNA

## 2024-06-28 LAB — RPR SER QL: NORMAL

## 2024-06-29 LAB
BACTERIAL VAGINOSIS DNA: POSITIVE
CANDIDA GLABRATA DNA: NEGATIVE
CANDIDA KRUSEI DNA: NEGATIVE
CANDIDA RRNA VAG QL PROBE: NEGATIVE
T VAGINALIS RRNA GENITAL QL PROBE: NEGATIVE

## 2024-07-02 RX ORDER — METRONIDAZOLE 500 MG/1
500 TABLET ORAL EVERY 12 HOURS
Qty: 14 TABLET | Refills: 0 | Status: SHIPPED | OUTPATIENT
Start: 2024-07-02 | End: 2024-07-09

## 2024-08-15 ENCOUNTER — OFFICE VISIT (OUTPATIENT)
Facility: CLINIC | Age: 29
End: 2024-08-15
Payer: MEDICAID

## 2024-08-15 VITALS
BODY MASS INDEX: 29.06 KG/M2 | DIASTOLIC BLOOD PRESSURE: 64 MMHG | HEART RATE: 82 BPM | WEIGHT: 170.19 LBS | SYSTOLIC BLOOD PRESSURE: 108 MMHG | HEIGHT: 64 IN

## 2024-08-15 DIAGNOSIS — N89.8 VAGINAL DISCHARGE: ICD-10-CM

## 2024-08-15 DIAGNOSIS — Z11.3 SCREENING EXAMINATION FOR VENEREAL DISEASE: Primary | ICD-10-CM

## 2024-08-15 DIAGNOSIS — N76.0 ACUTE VAGINITIS: ICD-10-CM

## 2024-08-15 PROCEDURE — 87491 CHLMYD TRACH DNA AMP PROBE: CPT | Performed by: STUDENT IN AN ORGANIZED HEALTH CARE EDUCATION/TRAINING PROGRAM

## 2024-08-15 PROCEDURE — 99999 PR PBB SHADOW E&M-EST. PATIENT-LVL III: CPT | Mod: PBBFAC,,, | Performed by: STUDENT IN AN ORGANIZED HEALTH CARE EDUCATION/TRAINING PROGRAM

## 2024-08-15 PROCEDURE — 99213 OFFICE O/P EST LOW 20 MIN: CPT | Mod: PBBFAC,PN | Performed by: STUDENT IN AN ORGANIZED HEALTH CARE EDUCATION/TRAINING PROGRAM

## 2024-08-15 PROCEDURE — 81514 NFCT DS BV&VAGINITIS DNA ALG: CPT | Performed by: STUDENT IN AN ORGANIZED HEALTH CARE EDUCATION/TRAINING PROGRAM

## 2024-08-15 PROCEDURE — 87591 N.GONORRHOEAE DNA AMP PROB: CPT | Performed by: STUDENT IN AN ORGANIZED HEALTH CARE EDUCATION/TRAINING PROGRAM

## 2024-08-15 RX ORDER — METRONIDAZOLE VAGINAL GEL, 1.3 % 65 MG/5G
1 GEL VAGINAL ONCE
Qty: 5 G | Refills: 1 | Status: SHIPPED | OUTPATIENT
Start: 2024-08-15 | End: 2024-08-15

## 2024-08-15 NOTE — PROGRESS NOTES
Subjective:    Patient ID: Carline Iqbal is a 29 y.o. y.o. female.     Chief Complaint:   Chief Complaint   Patient presents with    Consult     Discharge, burning, itching       History of Present Illness   Carline presents today complaining of burning, discharge, and odor. Symptoms began  2-3 days and have been persistent.  Patient's last menstrual period was 07/21/2024 (exact date).. She also denies abdominal pain. She is sexually active.    ROS:   CONSTITUTIONAL: Negative for fever, chills, diaphoresis, weakness, fatigue, weight loss, weight gain  GASTROINTESTINAL: negative for abdominal pain, flank pain, nausea, vomiting, diarrhea, constipation, black stool, blood in stool  BREAST: negative for breast  tenderness, breast mass, nipple discharge, or skin changes  GENITOURINARY: see HPI  HEMATOLOGIC/LYMPHATIC: negative for swollen lymph nodes, bleeding, bruising  MUSCULOSKELETAL: negative for back pain, joint pain, joint stiffness, joint swelling, muscle pain, muscle weakness  ENDOCRINE: negative for polydipsia/polyuria, palpitations, skin changes, temperature intolerance, unexpected weight changes      Objective:    Vital Signs:  Vitals:    08/15/24 1309   BP: 108/64   Pulse: 82       Physical Exam:  General:  alert,normal appearing gravid female   Neuro/Psych: AAOx3, appropriate mood and affect   Head: Normocephalic, atraumatic   Neck: Supple, normal ROM   Resp: Normal effort   Skin:  Skin color, texture, turgor normal. No rashes or lesions   Abdomen:  soft, non-tender; bowel sounds normal   Pelvis: External genitalia: normal general appearance  Urinary system: urethral meatus normal, bladder nontender  Vaginal: normal mucosa without prolapse or lesions, discharge, grey  Cervix: normal appearance  Uterus: normal size, shape, position  Adnexa: normal size, nontender bilaterally         Assessment:      1. Screening examination for venereal disease    2. Acute vaginitis    3. Vaginal discharge          Plan:       Screening examination for venereal disease  -     C. trachomatis/N. gonorrhoeae by AMP DNA [facility]; Cervix    Acute vaginitis  -     Vaginosis Screen by DNA Probe; Future; Expected date: 08/15/2024    Vaginal discharge  -     metroNIDAZOLE (NUVESSA) 1.3 % (65 mg/5 gram) Gel; Place 1 applicator vaginally once. for 1 dose  Dispense: 5 g; Refill: 1

## 2024-08-16 LAB
C TRACH DNA SPEC QL NAA+PROBE: NOT DETECTED
N GONORRHOEA DNA SPEC QL NAA+PROBE: NOT DETECTED

## 2024-12-17 ENCOUNTER — HOSPITAL ENCOUNTER (OUTPATIENT)
Dept: RADIOLOGY | Facility: HOSPITAL | Age: 29
Discharge: HOME | End: 2024-12-17
Attending: NURSE PRACTITIONER
Payer: MEDICAID

## 2024-12-17 ENCOUNTER — OFFICE VISIT (OUTPATIENT)
Dept: INTERNAL MEDICINE | Facility: CLINIC | Age: 29
End: 2024-12-17
Payer: MEDICAID

## 2024-12-17 VITALS
SYSTOLIC BLOOD PRESSURE: 116 MMHG | HEIGHT: 64 IN | RESPIRATION RATE: 16 BRPM | BODY MASS INDEX: 28 KG/M2 | DIASTOLIC BLOOD PRESSURE: 80 MMHG | WEIGHT: 164 LBS | OXYGEN SATURATION: 99 % | HEART RATE: 81 BPM

## 2024-12-17 DIAGNOSIS — M54.50 CHRONIC MIDLINE LOW BACK PAIN WITHOUT SCIATICA: ICD-10-CM

## 2024-12-17 DIAGNOSIS — G89.29 CHRONIC MIDLINE LOW BACK PAIN WITHOUT SCIATICA: Primary | ICD-10-CM

## 2024-12-17 DIAGNOSIS — G89.29 CHRONIC MIDLINE LOW BACK PAIN WITHOUT SCIATICA: ICD-10-CM

## 2024-12-17 DIAGNOSIS — M54.50 CHRONIC MIDLINE LOW BACK PAIN WITHOUT SCIATICA: Primary | ICD-10-CM

## 2024-12-17 DIAGNOSIS — L73.2 HIDRADENITIS AXILLARIS: ICD-10-CM

## 2024-12-17 PROCEDURE — 3008F BODY MASS INDEX DOCD: CPT | Mod: CPTII,,, | Performed by: NURSE PRACTITIONER

## 2024-12-17 PROCEDURE — 99214 OFFICE O/P EST MOD 30 MIN: CPT | Mod: S$PBB,,, | Performed by: NURSE PRACTITIONER

## 2024-12-17 PROCEDURE — 72110 X-RAY EXAM L-2 SPINE 4/>VWS: CPT | Mod: 26,,, | Performed by: RADIOLOGY

## 2024-12-17 PROCEDURE — 1159F MED LIST DOCD IN RCRD: CPT | Mod: CPTII,,, | Performed by: NURSE PRACTITIONER

## 2024-12-17 PROCEDURE — 99999 PR PBB SHADOW E&M-EST. PATIENT-LVL III: CPT | Mod: PBBFAC,,, | Performed by: NURSE PRACTITIONER

## 2024-12-17 PROCEDURE — 3074F SYST BP LT 130 MM HG: CPT | Mod: CPTII,,, | Performed by: NURSE PRACTITIONER

## 2024-12-17 PROCEDURE — 99213 OFFICE O/P EST LOW 20 MIN: CPT | Mod: PBBFAC,25 | Performed by: NURSE PRACTITIONER

## 2024-12-17 PROCEDURE — 3079F DIAST BP 80-89 MM HG: CPT | Mod: CPTII,,, | Performed by: NURSE PRACTITIONER

## 2024-12-17 PROCEDURE — 72070 X-RAY EXAM THORAC SPINE 2VWS: CPT | Mod: TC

## 2024-12-17 PROCEDURE — 72110 X-RAY EXAM L-2 SPINE 4/>VWS: CPT | Mod: TC

## 2024-12-17 PROCEDURE — 72070 X-RAY EXAM THORAC SPINE 2VWS: CPT | Mod: 26,,, | Performed by: RADIOLOGY

## 2024-12-17 RX ORDER — DOXYCYCLINE 100 MG/1
100 CAPSULE ORAL 2 TIMES DAILY
Qty: 60 CAPSULE | Refills: 0 | Status: SHIPPED | OUTPATIENT
Start: 2024-12-17

## 2024-12-17 NOTE — PROGRESS NOTES
Subjective:       Patient ID: Carline Iqbal is a 29 y.o. female.    Chief Complaint: back pain and Recurrent Skin Infections (Under left arm)      History of Present Illness    CHIEF COMPLAINT:  Carline presents today for chronic back pain.    HISTORY OF PRESENT ILLNESS:  She reports experiencing back pain for over a year, localized in the middle of the neck with associated stiffness. The pain is tender to touch and non-radiating. She has functional limitations, including difficulty with prolonged standing and inability to walk for exercise due to pain exacerbation. She was previously seen by Dr. Chicas in March 2023 and by Mar in January 2024, with a recent ER visit where she received similar medications to those prescribed in January 2024.    CURRENT MEDICATIONS:  She is currently taking Flexeril for muscle relaxation.    MEDICAL HISTORY:  She has a history of shoulder tear from two years ago.      ROS:  Musculoskeletal: +joint stiffness, +back pain          Objective:      Physical Exam    General: No acute distress. Well-developed. Well-nourished.  Eyes: EOMI. Sclerae anicteric.  HENT: Normocephalic. Atraumatic. Nares patent. Moist oral mucosa.  Ears: Bilateral TMs clear. Bilateral EACs clear.  Cardiovascular: Regular rate. Regular rhythm. No murmurs. No rubs. No gallops. Normal S1, S2.  Respiratory: Normal respiratory effort. Clear to auscultation bilaterally. No rales. No rhonchi. No wheezing.  Abdomen: Soft. Non-tender. Non-distended. Normoactive bowel sounds.  Musculoskeletal: No  obvious deformity.  Extremities: No lower extremity edema.  Neurological: Alert & oriented x3. No slurred speech. Normal gait.  Psychiatric: Normal mood. Normal affect. Good insight. Good judgment.  Skin: Warm. Dry. No rash.  Back: Tenderness on pressing the spine. Scoliosis noted. Right hip elevated. Right shoulder elevated. Left shoulder blade elevated on forward bend.          Assessment:       1. Chronic midline low  back pain without sciatica        Plan:     Problem List Items Addressed This Visit    None  Visit Diagnoses       Chronic midline low back pain without sciatica    -  Primary    Relevant Orders    X-Ray Thoracic Spine 4 or more views    X-Ray Lumbar Spine 5 View          Assessment & Plan    IMPRESSION:  - Assessed chronic back pain, ongoing for over 1 year  - Noted previous treatments (muscle strain diagnosis, steroid/anti-inflammatory/muscle relaxer) were ineffective  - Observed slight scoliosis and uneven shoulder/hip alignment  - Considered lumbar-thoracic x-ray to rule out vertebral slippage  - Plan to initiate physical therapy if x-rays show no significant structural issues    THORACIC SPINE PAIN:  - Continued ibuprofen as needed for pain.  - Continued Flexeril (cyclobenzaprine) as needed for muscle relaxation.  - Metaxalone remains an option if needed.    BACK MUSCLE SPASM:  - Continued Flexeril (cyclobenzaprine) as needed for muscle relaxation.  - Metaxalone remains an option if needed.    UNSPECIFIED SPINAL CONDITION:  - Lumbar-thoracic x-ray ordered.  - Will refer to physical therapy at [facility] if x-rays show no significant structural issues.          This note was generated with the assistance of ambient listening technology. Verbal consent was obtained by the patient and accompanying visitor(s) for the recording of patient appointment to facilitate this note. I attest to having reviewed and edited the generated note for accuracy, though some syntax or spelling errors may persist. Please contact the author of this note for any clarification.     Was on doxy from derm- would like a refill for an active abscess        Answers submitted by the patient for this visit:  Back Pain Questionnaire (Submitted on 12/16/2024)  Chief Complaint: Back pain  Chronicity: chronic  Onset: more than 1 year ago  Frequency: constantly  Progression since onset: gradually worsening  Pain location: thoracic spine  Pain quality:  aching, shooting, stabbing  Radiates to: does not radiate  Pain - numeric: 10/10  Pain is: the same all the time  Aggravated by: bending, position, lying down, sitting, standing  Stiffness is present: in the morning, at night, all day  abdominal pain: No  bladder incontinence: No  bowel incontinence: No  chest pain: No  dysuria: No  fever: No  headaches: No  leg pain: No  numbness: No  paresis: No  paresthesias: No  pelvic pain: No  perianal numbness: No  tingling: No  weakness: No  weight loss: No  genital pain: No  hematuria: No  Risk factors: obesity, pregnancy  Pain severity: moderate  Improvement on treatment: no relief

## 2025-02-10 ENCOUNTER — OFFICE VISIT (OUTPATIENT)
Dept: INTERNAL MEDICINE | Facility: CLINIC | Age: 30
End: 2025-02-10
Payer: MEDICAID

## 2025-02-10 VITALS
BODY MASS INDEX: 28.34 KG/M2 | HEART RATE: 64 BPM | RESPIRATION RATE: 12 BRPM | HEIGHT: 64 IN | DIASTOLIC BLOOD PRESSURE: 88 MMHG | WEIGHT: 166 LBS | SYSTOLIC BLOOD PRESSURE: 124 MMHG

## 2025-02-10 DIAGNOSIS — K29.80 DUODENITIS: Primary | ICD-10-CM

## 2025-02-10 PROCEDURE — 99999 PR PBB SHADOW E&M-EST. PATIENT-LVL III: CPT | Mod: PBBFAC,,, | Performed by: NURSE PRACTITIONER

## 2025-02-10 PROCEDURE — 99214 OFFICE O/P EST MOD 30 MIN: CPT | Mod: S$PBB,,, | Performed by: NURSE PRACTITIONER

## 2025-02-10 PROCEDURE — 3079F DIAST BP 80-89 MM HG: CPT | Mod: CPTII,,, | Performed by: NURSE PRACTITIONER

## 2025-02-10 PROCEDURE — 3074F SYST BP LT 130 MM HG: CPT | Mod: CPTII,,, | Performed by: NURSE PRACTITIONER

## 2025-02-10 PROCEDURE — 3008F BODY MASS INDEX DOCD: CPT | Mod: CPTII,,, | Performed by: NURSE PRACTITIONER

## 2025-02-10 PROCEDURE — 1159F MED LIST DOCD IN RCRD: CPT | Mod: CPTII,,, | Performed by: NURSE PRACTITIONER

## 2025-02-10 PROCEDURE — 99213 OFFICE O/P EST LOW 20 MIN: CPT | Mod: PBBFAC,PN | Performed by: NURSE PRACTITIONER

## 2025-02-10 RX ORDER — OMEPRAZOLE 40 MG/1
40 CAPSULE, DELAYED RELEASE ORAL EVERY MORNING
Qty: 30 CAPSULE | Refills: 2 | Status: SHIPPED | OUTPATIENT
Start: 2025-02-10 | End: 2025-03-12

## 2025-02-10 RX ORDER — SUCRALFATE 1 G/1
1 TABLET ORAL 4 TIMES DAILY
Qty: 28 TABLET | Refills: 0 | Status: SHIPPED | OUTPATIENT
Start: 2025-02-10 | End: 2025-02-17

## 2025-02-10 NOTE — PROGRESS NOTES
History of Present Illness    CHIEF COMPLAINT:  Patient presents today for abdominal pain.    HISTORY OF PRESENT ILLNESS:  She reports persistent abdominal pain localized to the right lower quadrant. Previously, the pain was mild and self-resolving, but current symptoms are not subsiding. She describes a prior severe episode with multiple symptoms including vomiting and inability to walk, enter water, eat, or take medications. No blood in stool and has normal bowel movements    MEDICAL HISTORY:  She has a history of a benign mass confirmed by endoscopy with biopsy. At the time of initial diagnosis, she was young and did not pursue further medical care, though prescribed medications alleviated her symptoms.    DIET:  She reports maintaining a  diet compared to previous eating habits.      ROS:  General: -fever, -chills, -fatigue, -weight gain, -weight loss  Eyes: -vision changes, -redness, -discharge  ENT: -ear pain, -nasal congestion, -sore throat  Cardiovascular: -chest pain, -palpitations, -lower extremity edema  Respiratory: -cough, -shortness of breath  Gastrointestinal: +abdominal pain, -nausea, +vomiting, -diarrhea, -constipation, -blood in stool  Genitourinary: -dysuria, -hematuria, -frequency  Musculoskeletal: -joint pain, -muscle pain  Skin: -rash, -lesion  Neurological: -headache, -dizziness, -numbness, -tingling  Psychiatric: -anxiety, -depression, -sleep difficulty          Physical Exam    General: No acute distress. Well-developed. Well-nourished.  Eyes: EOMI. Sclerae anicteric.  HENT: Normocephalic. Atraumatic. Nares patent. Moist oral mucosa.  Ears: Bilateral TMs clear. Bilateral EACs clear.  Cardiovascular: Regular rate. Regular rhythm. No murmurs. No rubs. No gallops. Normal S1, S2.  Respiratory: Normal respiratory effort. Clear to auscultation bilaterally. No rales. No rhonchi. No wheezing.  Abdomen: Soft. Tender to epigastric region. Non-distended. Normoactive bowel  sounds.  Musculoskeletal: No  obvious deformity.  Extremities: No lower extremity edema.  Neurological: Alert & oriented x3. No slurred speech. Normal gait.  Psychiatric: Normal mood. Normal affect. Good insight. Good judgment.  Skin: Warm. Dry. No rash.          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  - Noted the patient's report of persistent pain in the upper abdominal area.  - Performed physical exam, revealing tenderness in the upper abdominal area.  - Performed physical exam, confirming tenderness in the upper abdominal area.  - Acknowledged the pain and planned to provide medication for relief.  - Prescribed Tylenol as needed for pain relief.  - Advised the patient to discontinue ibuprofen and switch to Tylenol for pain relief.  - Discontinued ibuprofen.    GASTRIC MASS:  - Previous endoscopy revealed a benign mass, which was biopsied but not removed.  - Suggested getting another endoscopy to assess if the mass has grown.    GASTROENTEROLOGY REFERRAL:  - Placed an external referral order for gastroenterology.  - Referred the patient to gastroenterology for follow-up and possible repeat endoscopy.  - Instructed the patient to follow up with gastroenterology using provided referral and order.    GASTRIC LINING IRRITATION:  - Prescribed medication to be taken 4 times daily for 7 days to heal the condition and reduce pain.  - Prescribed medication to be taken 4 times daily for 7 days to promote healing of gastric lining.  - Prescribed an acid reducer to decrease gastric acid production.  - Advised the patient to discontinue ibuprofen as it may irritate the gastric lining.  - Educated on the importance of avoiding NSAIDs due to  "potential gastric irritation.    ACID REFLUX:  - Inquired about the patient's use of acid reflux medication.  - Recommend continuing the acid reflux medication.    FOLLOW UP:  - Contact the office if symptoms do not improve with current treatment plan.         This note was generated with the assistance of ambient listening technology. Verbal consent was obtained by the patient and accompanying visitor(s) for the recording of patient appointment to facilitate this note. I attest to having reviewed and edited the generated note for accuracy, though some syntax or spelling errors may persist. Please contact the author of this note for any clarification.      "This note will not be shared with the patient."  1. Duodenitis  sucralfate (CARAFATE) 1 gram tablet    omeprazole (PRILOSEC) 40 MG capsule    Ambulatory referral/consult to Gastroenterology       Rtc as scheduled  "

## 2025-02-25 ENCOUNTER — LAB VISIT (OUTPATIENT)
Dept: LAB | Facility: HOSPITAL | Age: 30
End: 2025-02-25
Attending: STUDENT IN AN ORGANIZED HEALTH CARE EDUCATION/TRAINING PROGRAM
Payer: MEDICAID

## 2025-02-25 ENCOUNTER — OFFICE VISIT (OUTPATIENT)
Facility: CLINIC | Age: 30
End: 2025-02-25
Payer: MEDICAID

## 2025-02-25 VITALS
BODY MASS INDEX: 28.12 KG/M2 | HEART RATE: 87 BPM | DIASTOLIC BLOOD PRESSURE: 82 MMHG | SYSTOLIC BLOOD PRESSURE: 104 MMHG | WEIGHT: 164.69 LBS | HEIGHT: 64 IN

## 2025-02-25 DIAGNOSIS — N76.0 ACUTE VAGINITIS: ICD-10-CM

## 2025-02-25 DIAGNOSIS — Z11.3 ENCOUNTER FOR SCREENING EXAMINATION FOR SEXUALLY TRANSMITTED DISEASE: ICD-10-CM

## 2025-02-25 DIAGNOSIS — Z11.3 SCREENING EXAMINATION FOR VENEREAL DISEASE: Primary | ICD-10-CM

## 2025-02-25 LAB
HAV IGM SERPL QL IA: NORMAL
HBV CORE IGM SERPL QL IA: NORMAL
HBV SURFACE AG SERPL QL IA: NORMAL
HCV AB SERPL QL IA: NORMAL
HIV 1+2 AB+HIV1 P24 AG SERPL QL IA: NORMAL
TREPONEMA PALLIDUM IGG+IGM AB [PRESENCE] IN SERUM OR PLASMA BY IMMUNOASSAY: NONREACTIVE

## 2025-02-25 PROCEDURE — 87491 CHLMYD TRACH DNA AMP PROBE: CPT | Performed by: STUDENT IN AN ORGANIZED HEALTH CARE EDUCATION/TRAINING PROGRAM

## 2025-02-25 PROCEDURE — 87389 HIV-1 AG W/HIV-1&-2 AB AG IA: CPT | Performed by: STUDENT IN AN ORGANIZED HEALTH CARE EDUCATION/TRAINING PROGRAM

## 2025-02-25 PROCEDURE — 99213 OFFICE O/P EST LOW 20 MIN: CPT | Mod: PBBFAC,PN | Performed by: STUDENT IN AN ORGANIZED HEALTH CARE EDUCATION/TRAINING PROGRAM

## 2025-02-25 PROCEDURE — 3008F BODY MASS INDEX DOCD: CPT | Mod: CPTII,,, | Performed by: STUDENT IN AN ORGANIZED HEALTH CARE EDUCATION/TRAINING PROGRAM

## 2025-02-25 PROCEDURE — 86593 SYPHILIS TEST NON-TREP QUANT: CPT | Performed by: STUDENT IN AN ORGANIZED HEALTH CARE EDUCATION/TRAINING PROGRAM

## 2025-02-25 PROCEDURE — 81515 NFCT DS BV&VAGINITIS DNA ALG: CPT | Performed by: STUDENT IN AN ORGANIZED HEALTH CARE EDUCATION/TRAINING PROGRAM

## 2025-02-25 PROCEDURE — 1159F MED LIST DOCD IN RCRD: CPT | Mod: CPTII,,, | Performed by: STUDENT IN AN ORGANIZED HEALTH CARE EDUCATION/TRAINING PROGRAM

## 2025-02-25 PROCEDURE — 3079F DIAST BP 80-89 MM HG: CPT | Mod: CPTII,,, | Performed by: STUDENT IN AN ORGANIZED HEALTH CARE EDUCATION/TRAINING PROGRAM

## 2025-02-25 PROCEDURE — 99999 PR PBB SHADOW E&M-EST. PATIENT-LVL III: CPT | Mod: PBBFAC,,, | Performed by: STUDENT IN AN ORGANIZED HEALTH CARE EDUCATION/TRAINING PROGRAM

## 2025-02-25 PROCEDURE — 99213 OFFICE O/P EST LOW 20 MIN: CPT | Mod: S$PBB,,, | Performed by: STUDENT IN AN ORGANIZED HEALTH CARE EDUCATION/TRAINING PROGRAM

## 2025-02-25 PROCEDURE — 87529 HSV DNA AMP PROBE: CPT | Mod: 59 | Performed by: STUDENT IN AN ORGANIZED HEALTH CARE EDUCATION/TRAINING PROGRAM

## 2025-02-25 PROCEDURE — 80074 ACUTE HEPATITIS PANEL: CPT | Performed by: STUDENT IN AN ORGANIZED HEALTH CARE EDUCATION/TRAINING PROGRAM

## 2025-02-25 PROCEDURE — 3074F SYST BP LT 130 MM HG: CPT | Mod: CPTII,,, | Performed by: STUDENT IN AN ORGANIZED HEALTH CARE EDUCATION/TRAINING PROGRAM

## 2025-02-25 NOTE — PROGRESS NOTES
Subjective:    Patient ID: Carline Iqbal is a 29 y.o. y.o. female.     Chief Complaint:   Chief Complaint   Patient presents with    STD CHECK    Vaginal Discharge       History of Present Illness   Carline presents today complaining of discharge. Symptoms began 3 days and have been persistent.  Patient's last menstrual period was 02/02/2025 (exact date).. She also denies fever and abdominal pain. She is sexually active.    ROS:   CONSTITUTIONAL: Negative for fever, chills, diaphoresis, weakness, fatigue, weight loss, weight gain  GASTROINTESTINAL: negative for abdominal pain, flank pain, nausea, vomiting, diarrhea, constipation, black stool, blood in stool  BREAST: negative for breast  tenderness, breast mass, nipple discharge, or skin changes  GENITOURINARY: see HPI   HEMATOLOGIC/LYMPHATIC: negative for swollen lymph nodes, bleeding, bruising  MUSCULOSKELETAL: negative for back pain, joint pain, joint stiffness, joint swelling, muscle pain, muscle weakness  ENDOCRINE: negative for polydipsia/polyuria, palpitations, skin changes, temperature intolerance, unexpected weight changes      Objective:    Vital Signs:  Vitals:    02/25/25 1301   BP: 104/82   Pulse: 87       Physical Exam:  General:  alert,normal appearing gravid female   Neuro/Psych: AAOx3, appropriate mood and affect   Head: Normocephalic, atraumatic   Neck: Supple, normal ROM   Resp: Normal effort   Skin:  Skin color, texture, turgor normal. No rashes or lesions   Abdomen:  soft, non-tender; bowel sounds normal   Pelvis: External genitalia: normal general appearance  Urinary system: urethral meatus normal, bladder nontender  Vaginal: normal mucosa without prolapse or lesions, discharge, white  Cervix: normal appearance  Uterus: normal size, shape, position  Adnexa: normal size, nontender bilaterally         Assessment:      1. Screening examination for venereal disease    2. Acute vaginitis    3. Encounter for screening examination for sexually  transmitted disease          Plan:      Screening examination for venereal disease  -     C. trachomatis/N. gonorrhoeae by AMP DNA [facility]; Cervix    Acute vaginitis  -     Vaginosis Screen by DNA Probe; Future; Expected date: 02/25/2025    Encounter for screening examination for sexually transmitted disease  -     HIV 1/2 Ag/Ab (4th Gen); Future; Expected date: 02/25/2025  -     Treponema Pallidium Antibodies IgG, IgM; Future; Expected date: 02/25/2025  -     HEPATITIS PANEL, ACUTE; Future; Expected date: 02/25/2025  -     Herpes simplex Virus (HSV) Type 1 & 2 DNA by PCR

## 2025-02-26 ENCOUNTER — RESULTS FOLLOW-UP (OUTPATIENT)
Dept: OBSTETRICS AND GYNECOLOGY | Facility: CLINIC | Age: 30
End: 2025-02-26
Payer: MEDICAID

## 2025-02-26 DIAGNOSIS — N76.0 BV (BACTERIAL VAGINOSIS): Primary | ICD-10-CM

## 2025-02-26 DIAGNOSIS — B96.89 BV (BACTERIAL VAGINOSIS): Primary | ICD-10-CM

## 2025-02-26 DIAGNOSIS — B37.31 VAGINAL YEAST INFECTION: ICD-10-CM

## 2025-02-28 LAB
BACTERIAL VAGINOSIS DNA: DETECTED
C TRACH DNA SPEC QL NAA+PROBE: NOT DETECTED
CANDIDA GLABRATA/KRUSEI: DETECTED
CANDIDA RRNA VAG QL PROBE: NOT DETECTED
N GONORRHOEA DNA SPEC QL NAA+PROBE: NOT DETECTED
TRICHOMONAS VAGINALIS: NOT DETECTED

## 2025-02-28 RX ORDER — METRONIDAZOLE 500 MG/1
500 TABLET ORAL EVERY 12 HOURS
Qty: 14 TABLET | Refills: 0 | Status: SHIPPED | OUTPATIENT
Start: 2025-02-28 | End: 2025-03-07

## 2025-02-28 RX ORDER — FLUCONAZOLE 150 MG/1
150 TABLET ORAL ONCE
Qty: 1 TABLET | Refills: 1 | Status: SHIPPED | OUTPATIENT
Start: 2025-02-28 | End: 2025-02-28

## 2025-03-01 LAB
HSV-1 DNA BY PCR: NEGATIVE
HSV-2 DNA BY PCR: NEGATIVE

## 2025-04-15 ENCOUNTER — TELEPHONE (OUTPATIENT)
Dept: GASTROENTEROLOGY | Facility: CLINIC | Age: 30
End: 2025-04-15

## 2025-04-15 ENCOUNTER — OFFICE VISIT (OUTPATIENT)
Dept: GASTROENTEROLOGY | Facility: CLINIC | Age: 30
End: 2025-04-15
Payer: MEDICAID

## 2025-04-15 VITALS
HEART RATE: 73 BPM | DIASTOLIC BLOOD PRESSURE: 93 MMHG | HEIGHT: 64 IN | WEIGHT: 165.88 LBS | SYSTOLIC BLOOD PRESSURE: 136 MMHG | BODY MASS INDEX: 28.32 KG/M2

## 2025-04-15 DIAGNOSIS — K29.80 DUODENITIS: ICD-10-CM

## 2025-04-15 DIAGNOSIS — K31.89 MASS OF STOMACH: Primary | ICD-10-CM

## 2025-04-15 DIAGNOSIS — R10.13 EPIGASTRIC PAIN: ICD-10-CM

## 2025-04-15 PROBLEM — E61.1 IRON DEFICIENCY: Status: RESOLVED | Noted: 2017-12-11 | Resolved: 2025-04-15

## 2025-04-15 PROCEDURE — 1159F MED LIST DOCD IN RCRD: CPT | Mod: CPTII,,, | Performed by: NURSE PRACTITIONER

## 2025-04-15 PROCEDURE — 3008F BODY MASS INDEX DOCD: CPT | Mod: CPTII,,, | Performed by: NURSE PRACTITIONER

## 2025-04-15 PROCEDURE — 99204 OFFICE O/P NEW MOD 45 MIN: CPT | Mod: S$PBB,,, | Performed by: NURSE PRACTITIONER

## 2025-04-15 PROCEDURE — 99214 OFFICE O/P EST MOD 30 MIN: CPT | Mod: PBBFAC,PN | Performed by: NURSE PRACTITIONER

## 2025-04-15 PROCEDURE — 3080F DIAST BP >= 90 MM HG: CPT | Mod: CPTII,,, | Performed by: NURSE PRACTITIONER

## 2025-04-15 PROCEDURE — 99999 PR PBB SHADOW E&M-EST. PATIENT-LVL IV: CPT | Mod: PBBFAC,,, | Performed by: NURSE PRACTITIONER

## 2025-04-15 PROCEDURE — 3075F SYST BP GE 130 - 139MM HG: CPT | Mod: CPTII,,, | Performed by: NURSE PRACTITIONER

## 2025-04-15 PROCEDURE — 1160F RVW MEDS BY RX/DR IN RCRD: CPT | Mod: CPTII,,, | Performed by: NURSE PRACTITIONER

## 2025-04-15 NOTE — PATIENT INSTRUCTIONS
Dear Ms. Iqbal    Attached are your instructions for your upcoming procedure. Please take some time to carefully review them and write down any questions you may have. A nurse will call you 1-2 weeks prior to your procedure to go over the instructions and answer any questions you may have.       EGD Instructions    Date of procedure: Tuesday, 4/22/2025    Arrival Time: We will call you 1 day prior to your procedure to provide you with an arrival time.    Location of Department:   [facility] [address] [place] [address]   1st Floor Same Day Surgery    The day before your procedure: Monday, 4/21     You may have a light evening meal.   No solid food after 7:00 pm.   Continue drinking clear liquids.     The day of the procedure: Tuesday, 4/22    You may have water/clear liquids until 2 hours before your procedure or as directed by the scheduling nurse    Clear Liquids That Are OK to Drink:   Water  Sports drinks (Gatorade, Power-Aid)  Crystal Light, Lemonade, Limeaid  Coffee or tea (no cream or nondairy creamer)  Clear juices without pulp (apple, white grape)  Clear soda (sprite, coke, ginger ale)      What You CANNOT do:   Do not drink milk or any dairy products.  Do not drink alcohol.  No gum chewing or candy morning of procedure.        Comments:         Medications Instructions below:    If you take HEART, BLOOD PRESSURE, SEIZURE, PAIN, LUNG (including inhalers/nebulizers), ANTI-REJECTION (transplant patients), or PSYCHIATRIC medications, please take at your regular times with a sip of water or as directed by the scheduling nurse.    If you begin taking any blood thinning medications, injectable weight loss/diabetes medications (other than insulin), or Adipex(Phentermine) prior to your procedure please contact the endoscopy scheduling department listed below AS SOON AS POSSIBLE. If these medications are not held for the appropriate amount of days prior to procedure, your  procedure will be canceled.      If you have any health changes prior to your procedure, please contact the endoscopy department to report changes.    On occasion, unforeseen circumstances may cause a delay in your procedure start time. We respect your time and appreciate your patience during these circumstances.      Important contact information:    [facility] Endoscopy Department - [phone].  Hours of operation are Monday-Friday 8:00-4:30pm.    If you have questions regarding the prep or you need to reschedule, please call [phone].    After hours questions requiring immediate assistance, contact [facility] nurse line at [phone] or [phone].     Questions about insurance or financial obligations call [phone] or [phone].      Comments:                           IMPORTANT INFORMATION TO KNOW BEFORE YOUR PROCEDURE     [facility]      An adult friend/ family member MUST come with you to drive you home. You can not drive, take a taxi, Uber, Lyft, bus, or any form of public transportation without being accompanied by a  responsible adult. The responsible adult CAN NOT be the  of the service.     person must be available to return to pick you up within 15 minutes of being notified of discharge.    Please bring a picture ID, insurance card, & copayment    LEAVE ALL VALUABLES AND JEWELRY AT HOME. [place] WILL NOT HOLD OR BE RESPONSIBLE FOR ANY LOST VALUABLES, JEWELRY, OR PERSONAL BELONGINGS. YOUR PERSONAL BELONGINGS MUST BE HELD BY YOUR ACCOMPANYING FRIEND/FAMILY MEMBER.    Plan to be at the hospital for 2-4 hours.           Please follow the instructions below:  1. You will see an American flag flying in the front of the hospital from [address] as close as you can in that lot.  2. Behind the flag, you will see a covered [address], enter through those automatic double doors.  3. Immediately when you enter, you  should be greeted by a team member who can assist you.  4. If not, follow the hallway immediately in front of you to the right towards the cafeteria.  5. Once you reach the cafeteria, there are only two options. You can either enter the cafeteria or continue down the hallway to your left. You will want to continue down the hallway all the way down to your left until you absolutely cannot go anymore. You will then find yourself in our Same Day Surgery lobby where you will check in behind the glass window.      [facility] Map for Endoscopy Procedures:

## 2025-04-15 NOTE — TELEPHONE ENCOUNTER
Dear Ms. Iqbal     Attached are your instructions for your upcoming procedure. Please take some time to carefully review them and write down any questions you may have. A nurse will call you 1-2 weeks prior to your procedure to go over the instructions and answer any questions you may have.         EGD Instructions     Date of procedure: Tuesday, 4/22/2025     Arrival Time: We will call you 1 day prior to your procedure to provide you with an arrival time.     Location of Department:   [facility] [address] [place] [address]   1st Floor Same Day Surgery     The day before your procedure: Monday, 4/21      You may have a light evening meal.   No solid food after 7:00 pm.   Continue drinking clear liquids.      The day of the procedure: Tuesday, 4/22     You may have water/clear liquids until 2 hours before your procedure or as directed by the scheduling nurse     Clear Liquids That Are OK to Drink:   Water  Sports drinks (Gatorade, Power-Aid)  Crystal Light, Lemonade, Limeaid  Coffee or tea (no cream or nondairy creamer)  Clear juices without pulp (apple, white grape)  Clear soda (sprite, coke, ginger ale)        What You CANNOT do:   Do not drink milk or any dairy products.  Do not drink alcohol.  No gum chewing or candy morning of procedure.           Comments:            Medications Instructions below:     If you take HEART, BLOOD PRESSURE, SEIZURE, PAIN, LUNG (including inhalers/nebulizers), ANTI-REJECTION (transplant patients), or PSYCHIATRIC medications, please take at your regular times with a sip of water or as directed by the scheduling nurse.     If you begin taking any blood thinning medications, injectable weight loss/diabetes medications (other than insulin), or Adipex(Phentermine) prior to your procedure please contact the endoscopy scheduling department listed below AS SOON AS POSSIBLE. If these medications are not held for the appropriate amount of  days prior to procedure, your procedure will be canceled.        If you have any health changes prior to your procedure, please contact the endoscopy department to report changes.     On occasion, unforeseen circumstances may cause a delay in your procedure start time. We respect your time and appreciate your patience during these circumstances.        Important contact information:     [facility] Endoscopy Department - [phone].  Hours of operation are Monday-Friday 8:00-4:30pm.     If you have questions regarding the prep or you need to reschedule, please call [phone].     After hours questions requiring immediate assistance, contact [facility] nurse line at [phone] or [phone].      Questions about insurance or financial obligations call [phone] or [phone].        Comments:                              IMPORTANT INFORMATION TO KNOW BEFORE YOUR PROCEDURE     [facility]        An adult friend/ family member MUST come with you to drive you home. You can not drive, take a taxi, Uber, Lyft, bus, or any form of public transportation without being accompanied by a  responsible adult. The responsible adult CAN NOT be the  of the service.      person must be available to return to pick you up within 15 minutes of being notified of discharge.     Please bring a picture ID, insurance card, & copayment     LEAVE ALL VALUABLES AND JEWELRY AT HOME. [facility] WILL NOT HOLD OR BE RESPONSIBLE FOR ANY LOST VALUABLES, JEWELRY, OR PERSONAL BELONGINGS. YOUR PERSONAL BELONGINGS MUST BE HELD BY YOUR ACCOMPANYING FRIEND/FAMILY MEMBER.     Plan to be at the hospital for 2-4 hours.              Please follow the instructions below:  1. You will see an American flag flying in the front of the hospital from [address] as close as you can in that lot.  2. Behind the flag, you will see a covered [place] [address], enter through those  automatic double doors.  3. Immediately when you enter, you should be greeted by a team member who can assist you.  4. If not, follow the hallway immediately in front of you to the right towards the cafeteria.  5. Once you reach the cafeteria, there are only two options. You can either enter the cafeteria or continue down the hallway to your left. You will want to continue down the hallway all the way down to your left until you absolutely cannot go anymore. You will then find yourself in our Same Day Surgery lobby where you will check in behind the glass window.        [facility] Map for Endoscopy Procedures:

## 2025-04-15 NOTE — PROGRESS NOTES
Subjective:       Patient ID: Carline Iqbal is a 30 y.o. female.    Chief Complaint: Abdominal Pain    29 y/o female with history of duodenitis and benign gastric mass presents to clinic for follow up. Patient previously seen by [facility] Dr. Mann in 2018 for duodenitis. EGD performed revealed submucosal and ulcerated non-bleeding mass in the antrum and pre-pyloric region; path-benign. She reports recurrent severe upper abdominal pain, nausea and vomiting two months ago. Seen by PCP and treated with omeprazole and sucralfate. No recurrent abdominal pain in over 1 month. Normal appetite. No unintentional weight loss. Normal BMs daily. No hematochezia or melena. Denies chronic NSAID use. Drinks etoh on occasion.        Past Medical History:   Diagnosis Date    Asthma     Asthma affecting pregnancy in third trimester 01/05/2022    Eczema     Pilonidal cyst 04/2016       Past Surgical History:   Procedure Laterality Date    pilonidal cyst removal      TYMPANOSTOMY TUBE PLACEMENT Bilateral        Family History   Problem Relation Name Age of Onset    Cancer Paternal Grandmother Jazmine Iqbal         throat    Ovarian cancer Maternal Grandmother Stella Meliton     Diabetes Maternal Grandmother Stella Meliton     Breast cancer Maternal Grandmother Stella Meliton 28    Stroke Maternal Grandmother Stella Meliton     Cancer Maternal Grandmother Stella Meliton         breast and ovarian    Stroke Maternal Grandfather Mono Wilcoxt     Asthma Maternal Grandfather Mono Caceres     COPD Maternal Grandfather Mono Caceres     Hypertension Father Ken Iqbal     Hypertension Mother Yesenia [place]     Asthma Mother Yesenia Morris     Anesthesia problems Neg Hx      Colon cancer Neg Hx      Heart disease Neg Hx         Social History     Socioeconomic History    Marital status: Single   Occupational History    Occupation: [employer]   Tobacco Use    Smoking status: Never     Passive exposure: Never    Smokeless tobacco: Never  "  Substance and Sexual Activity    Alcohol use: Yes     Comment: Rarely    Drug use: Never    Sexual activity: Yes     Partners: Male     Birth control/protection: None     Social Drivers of Health     Financial Resource Strain: Medium Risk (2/10/2025)    Overall Financial Resource Strain (CARDIA)     Difficulty of Paying Living Expenses: Somewhat hard   Food Insecurity: Food Insecurity Present (2/10/2025)    Hunger Vital Sign     Worried About Running Out of Food in the Last Year: Sometimes true     Ran Out of Food in the Last Year: Never true   Transportation Needs: No Transportation Needs (12/29/2023)    PRAPARE - Transportation     Lack of Transportation (Medical): No     Lack of Transportation (Non-Medical): No   Physical Activity: Insufficiently Active (2/10/2025)    Exercise Vital Sign     Days of Exercise per Week: 3 days     Minutes of Exercise per Session: 40 min   Stress: No Stress Concern Present (2/10/2025)    [employer] [place] [employer] - Occupational Stress Questionnaire     Feeling of Stress : Only a little   Housing Stability: Unknown (2/10/2025)    Housing Stability Vital Sign     Unable to Pay for Housing in the Last Year: No       Review of Systems   Constitutional:  Negative for appetite change and unexpected weight change.   HENT:  Negative for trouble swallowing.    Respiratory:  Negative for shortness of breath.    Cardiovascular:  Negative for chest pain.   Gastrointestinal:  Positive for abdominal pain. Negative for constipation, diarrhea, nausea and vomiting.   Hematological:  Negative for adenopathy. Does not bruise/bleed easily.   Psychiatric/Behavioral:  Negative for dysphoric mood.          Objective:     Vitals:    04/15/25 0936   BP: (!) 136/93   BP Location: Left arm   Patient Position: Sitting   Pulse: 73   Weight: 75.3 kg (165 lb 14.3 oz)   Height: 5' 4" (1.626 m)          Physical Exam  Constitutional:       General: She is not in acute distress.     Appearance: " Normal appearance.   HENT:      Head: Normocephalic.   Pulmonary:      Effort: Pulmonary effort is normal. No respiratory distress.   Abdominal:      General: Bowel sounds are normal. There is no distension.      Palpations: Abdomen is soft.      Tenderness: There is no abdominal tenderness.   Musculoskeletal:         General: Normal range of motion.      Cervical back: Normal range of motion.   Skin:     General: Skin is warm and dry.   Neurological:      Mental Status: She is alert and oriented to person, place, and time.   Psychiatric:         Mood and Affect: Mood normal.         Behavior: Behavior normal.               Assessment:         ICD-10-CM ICD-9-CM   1. Mass of stomach  K31.89 537.89   2. Epigastric pain  R10.13 789.06   3. Duodenitis  K29.80 535.60       Plan:       Mass of stomach; Epigastric pain; Duodenitis  -     Repeat EGD for surveillance of gastric mass  -     Case Request Endoscopy: EGD (ESOPHAGOGASTRODUODENOSCOPY)      Follow up if symptoms worsen or fail to improve.     Patient's Medications   New Prescriptions    No medications on file   Previous Medications    ALBUTEROL (PROVENTIL) 2.5 MG /3 ML (0.083 %) NEBULIZER SOLUTION    Take 3 mLs (2.5 mg total) by nebulization every 6 (six) hours as needed for Wheezing.    DOXYCYCLINE (VIBRAMYCIN) 100 MG CAP    Take 1 capsule (100 mg total) by mouth 2 (two) times daily.    OMEPRAZOLE (PRILOSEC) 40 MG CAPSULE    Take 1 capsule (40 mg total) by mouth every morning.    TRIAMCINOLONE ACETONIDE 0.1% (KENALOG) 0.1 % CREAM    Apply topically 2 (two) times daily.   Modified Medications    No medications on file   Discontinued Medications    No medications on file

## 2025-05-08 ENCOUNTER — TELEPHONE (OUTPATIENT)
Dept: ENDOSCOPY | Facility: HOSPITAL | Age: 30
End: 2025-05-08
Payer: MEDICAID

## 2025-05-08 DIAGNOSIS — R10.9 ABDOMINAL PAIN, UNSPECIFIED ABDOMINAL LOCATION: ICD-10-CM

## 2025-05-08 DIAGNOSIS — K31.89 SUBMUCOSAL LESION OF STOMACH: Primary | ICD-10-CM

## 2025-05-08 NOTE — TELEPHONE ENCOUNTER
Per Dr. Francois, schedule EUS at [facility] for ongoing abdominal pain and antral submucosal nodule referred by Dr. Markham.    Patient is scheduled for a Upper Endoscopy Ultrasound (EUS) on 6/2/25 with Dr. ULICES Francois

## 2025-05-29 ENCOUNTER — TELEPHONE (OUTPATIENT)
Dept: ENDOSCOPY | Facility: HOSPITAL | Age: 30
End: 2025-05-29
Payer: MEDICAID

## 2025-05-29 NOTE — TELEPHONE ENCOUNTER
Spoke with patient, confirmed procedure date (June 2) and arrival time (0800). No new questions at this time.

## 2025-06-02 ENCOUNTER — ANESTHESIA EVENT (OUTPATIENT)
Dept: ENDOSCOPY | Facility: HOSPITAL | Age: 30
End: 2025-06-02
Payer: MEDICAID

## 2025-06-02 ENCOUNTER — ANESTHESIA (OUTPATIENT)
Dept: ENDOSCOPY | Facility: HOSPITAL | Age: 30
End: 2025-06-02
Payer: MEDICAID

## 2025-06-02 ENCOUNTER — HOSPITAL ENCOUNTER (OUTPATIENT)
Facility: HOSPITAL | Age: 30
Discharge: HOME | End: 2025-06-02
Attending: INTERNAL MEDICINE | Admitting: INTERNAL MEDICINE
Payer: MEDICAID

## 2025-06-02 VITALS
WEIGHT: 165 LBS | OXYGEN SATURATION: 99 % | TEMPERATURE: 98 F | DIASTOLIC BLOOD PRESSURE: 72 MMHG | HEART RATE: 69 BPM | SYSTOLIC BLOOD PRESSURE: 135 MMHG | BODY MASS INDEX: 28.17 KG/M2 | RESPIRATION RATE: 12 BRPM | HEIGHT: 64 IN

## 2025-06-02 DIAGNOSIS — R93.3 ABNORMAL ENDOSCOPY OF UPPER GASTROINTESTINAL TRACT: ICD-10-CM

## 2025-06-02 LAB
B-HCG UR QL: NEGATIVE
CTP QC/QA: YES

## 2025-06-02 PROCEDURE — 43259 EGD US EXAM DUODENUM/JEJUNUM: CPT | Mod: ,,, | Performed by: INTERNAL MEDICINE

## 2025-06-02 PROCEDURE — 43259 EGD US EXAM DUODENUM/JEJUNUM: CPT | Performed by: INTERNAL MEDICINE

## 2025-06-02 PROCEDURE — 63600175 PHARM REV CODE 636 W HCPCS: Performed by: NURSE ANESTHETIST, CERTIFIED REGISTERED

## 2025-06-02 PROCEDURE — 37000008 HC ANESTHESIA 1ST 15 MINUTES: Performed by: INTERNAL MEDICINE

## 2025-06-02 PROCEDURE — 25000003 PHARM REV CODE 250: Performed by: NURSE ANESTHETIST, CERTIFIED REGISTERED

## 2025-06-02 PROCEDURE — 81025 URINE PREGNANCY TEST: CPT | Performed by: INTERNAL MEDICINE

## 2025-06-02 PROCEDURE — 37000009 HC ANESTHESIA EA ADD 15 MINS: Performed by: INTERNAL MEDICINE

## 2025-06-02 RX ORDER — DEXMEDETOMIDINE HYDROCHLORIDE 100 UG/ML
INJECTION, SOLUTION INTRAVENOUS
Status: DISCONTINUED | OUTPATIENT
Start: 2025-06-02 | End: 2025-06-02

## 2025-06-02 RX ORDER — PROPOFOL 10 MG/ML
VIAL (ML) INTRAVENOUS CONTINUOUS PRN
Status: DISCONTINUED | OUTPATIENT
Start: 2025-06-02 | End: 2025-06-02

## 2025-06-02 RX ORDER — PROPOFOL 10 MG/ML
VIAL (ML) INTRAVENOUS
Status: DISCONTINUED | OUTPATIENT
Start: 2025-06-02 | End: 2025-06-02

## 2025-06-02 RX ORDER — SODIUM CHLORIDE 9 MG/ML
INJECTION, SOLUTION INTRAVENOUS CONTINUOUS
Status: DISCONTINUED | OUTPATIENT
Start: 2025-06-02 | End: 2025-06-02 | Stop reason: HOSPADM

## 2025-06-02 RX ORDER — LIDOCAINE HYDROCHLORIDE 20 MG/ML
INJECTION INTRAVENOUS
Status: DISCONTINUED | OUTPATIENT
Start: 2025-06-02 | End: 2025-06-02

## 2025-06-02 RX ORDER — TOPICAL ANESTHETIC 200 MG/ML
SPRAY DENTAL; PERIODONTAL
Status: DISCONTINUED | OUTPATIENT
Start: 2025-06-02 | End: 2025-06-02

## 2025-06-02 RX ADMIN — DEXMEDETOMIDINE HYDROCHLORIDE 12 MCG: 100 INJECTION, SOLUTION, CONCENTRATE INTRAVENOUS at 08:06

## 2025-06-02 RX ADMIN — PROPOFOL 175 MCG/KG/MIN: 10 INJECTION, EMULSION INTRAVENOUS at 08:06

## 2025-06-02 RX ADMIN — LIDOCAINE HYDROCHLORIDE 50 MG: 20 INJECTION, SOLUTION INTRAVENOUS at 08:06

## 2025-06-02 RX ADMIN — SODIUM CHLORIDE: 0.9 INJECTION, SOLUTION INTRAVENOUS at 08:06

## 2025-06-02 RX ADMIN — TOPICAL ANESTHETIC 1 EACH: 200 SPRAY DENTAL; PERIODONTAL at 08:06

## 2025-06-02 RX ADMIN — PROPOFOL 20 MG: 10 INJECTION, EMULSION INTRAVENOUS at 08:06

## 2025-06-02 RX ADMIN — PROPOFOL 80 MG: 10 INJECTION, EMULSION INTRAVENOUS at 08:06

## 2025-06-02 RX ADMIN — GLYCOPYRROLATE 0.2 MG: 0.2 INJECTION, SOLUTION INTRAMUSCULAR; INTRAVITREAL at 08:06

## 2025-06-12 ENCOUNTER — OFFICE VISIT (OUTPATIENT)
Facility: CLINIC | Age: 30
End: 2025-06-12
Payer: MEDICAID

## 2025-06-12 VITALS
DIASTOLIC BLOOD PRESSURE: 82 MMHG | HEIGHT: 64 IN | BODY MASS INDEX: 28.14 KG/M2 | HEART RATE: 80 BPM | WEIGHT: 164.81 LBS | SYSTOLIC BLOOD PRESSURE: 124 MMHG

## 2025-06-12 DIAGNOSIS — N89.8 VAGINAL DISCHARGE: Primary | ICD-10-CM

## 2025-06-12 PROCEDURE — 1159F MED LIST DOCD IN RCRD: CPT | Mod: CPTII,,, | Performed by: STUDENT IN AN ORGANIZED HEALTH CARE EDUCATION/TRAINING PROGRAM

## 2025-06-12 PROCEDURE — 99213 OFFICE O/P EST LOW 20 MIN: CPT | Mod: PBBFAC,PN | Performed by: STUDENT IN AN ORGANIZED HEALTH CARE EDUCATION/TRAINING PROGRAM

## 2025-06-12 PROCEDURE — 1160F RVW MEDS BY RX/DR IN RCRD: CPT | Mod: CPTII,,, | Performed by: STUDENT IN AN ORGANIZED HEALTH CARE EDUCATION/TRAINING PROGRAM

## 2025-06-12 PROCEDURE — 3008F BODY MASS INDEX DOCD: CPT | Mod: CPTII,,, | Performed by: STUDENT IN AN ORGANIZED HEALTH CARE EDUCATION/TRAINING PROGRAM

## 2025-06-12 PROCEDURE — 99213 OFFICE O/P EST LOW 20 MIN: CPT | Mod: S$PBB,,, | Performed by: STUDENT IN AN ORGANIZED HEALTH CARE EDUCATION/TRAINING PROGRAM

## 2025-06-12 PROCEDURE — 99999 PR PBB SHADOW E&M-EST. PATIENT-LVL III: CPT | Mod: PBBFAC,,, | Performed by: STUDENT IN AN ORGANIZED HEALTH CARE EDUCATION/TRAINING PROGRAM

## 2025-06-12 PROCEDURE — 3079F DIAST BP 80-89 MM HG: CPT | Mod: CPTII,,, | Performed by: STUDENT IN AN ORGANIZED HEALTH CARE EDUCATION/TRAINING PROGRAM

## 2025-06-12 PROCEDURE — 3074F SYST BP LT 130 MM HG: CPT | Mod: CPTII,,, | Performed by: STUDENT IN AN ORGANIZED HEALTH CARE EDUCATION/TRAINING PROGRAM

## 2025-06-12 RX ORDER — FLUCONAZOLE 150 MG/1
150 TABLET ORAL ONCE
Qty: 1 TABLET | Refills: 1 | Status: SHIPPED | OUTPATIENT
Start: 2025-06-12 | End: 2025-06-12

## 2025-06-12 NOTE — PROGRESS NOTES
Subjective:    Patient ID: Carline Iqbal is a 30 y.o. y.o. female.     Chief Complaint:   Chief Complaint   Patient presents with    Vaginal Discharge    Vulvar Itch    Vaginal burning       History of Present Illness   Carline presents today complaining of burning, discharge, and vulvar itching. Symptoms began 1  weeks and have been persistent.  Patient's last menstrual period was 06/12/2025.. She also denies dysuria, frequency, fever, and abdominal pain. She is sexually active.    ROS:   CONSTITUTIONAL: Negative for fever, chills, diaphoresis, weakness, fatigue, weight loss, weight gain  GASTROINTESTINAL: negative for abdominal pain, flank pain, nausea, vomiting, diarrhea, constipation, black stool, blood in stool  BREAST: negative for breast  tenderness, breast mass, nipple discharge, or skin changes  GENITOURINARY: see HPI  HEMATOLOGIC/LYMPHATIC: negative for swollen lymph nodes, bleeding, bruising  MUSCULOSKELETAL: negative for back pain, joint pain, joint stiffness, joint swelling, muscle pain, muscle weakness  ENDOCRINE: negative for polydipsia/polyuria, palpitations, skin changes, temperature intolerance, unexpected weight changes      Objective:    Vital Signs:  Vitals:    06/12/25 1401   BP: 124/82   Pulse: 80       Physical Exam:  General:  alert,normal appearing gravid female   Neuro/Psych: AAOx3, appropriate mood and affect   Head: Normocephalic, atraumatic   Neck: Supple, normal ROM   Resp: Normal effort   Skin:  Skin color, texture, turgor normal. No rashes or lesions   Abdomen:  soft, non-tender; bowel sounds normal   Pelvis: External genitalia: normal general appearance  Urinary system: urethral meatus normal, bladder nontender  Vaginal: normal mucosa without prolapse or lesions, presence of blood  Cervix: normal appearance, presence of blood from cervical os  Uterus: normal size, shape, position  Adnexa: normal size, nontender bilaterally         Assessment:      1. Vaginal discharge           Plan:      Vaginal discharge  -     Vaginosis Screen by DNA Probe  -     C. trachomatis/N. gonorrhoeae by AMP DNA [facility]; Cervix  -     HIV 1/2 Ag/Ab (4th Gen); Future; Expected date: 06/12/2025  -     HEPATITIS PANEL, ACUTE; Future; Expected date: 06/12/2025  -     Treponema Pallidium Antibodies IgG, IgM; Future; Expected date: 06/12/2025  -     fluconazole (DIFLUCAN) 150 MG Tab; Take 1 tablet (150 mg total) by mouth once. for 1 dose  Dispense: 1 tablet; Refill: 1

## 2025-06-16 ENCOUNTER — RESULTS FOLLOW-UP (OUTPATIENT)
Dept: OBSTETRICS AND GYNECOLOGY | Facility: CLINIC | Age: 30
End: 2025-06-16
Payer: MEDICAID

## 2025-06-16 DIAGNOSIS — B96.89 BV (BACTERIAL VAGINOSIS): Primary | ICD-10-CM

## 2025-06-16 DIAGNOSIS — N76.0 BV (BACTERIAL VAGINOSIS): Primary | ICD-10-CM

## 2025-06-18 ENCOUNTER — OFFICE VISIT (OUTPATIENT)
Dept: INTERNAL MEDICINE | Facility: CLINIC | Age: 30
End: 2025-06-18
Payer: MEDICAID

## 2025-06-18 VITALS
WEIGHT: 164.69 LBS | RESPIRATION RATE: 18 BRPM | HEIGHT: 64 IN | BODY MASS INDEX: 28.12 KG/M2 | DIASTOLIC BLOOD PRESSURE: 98 MMHG | HEART RATE: 80 BPM | OXYGEN SATURATION: 99 % | SYSTOLIC BLOOD PRESSURE: 140 MMHG

## 2025-06-18 DIAGNOSIS — M94.262 CHONDROMALACIA, LEFT KNEE: ICD-10-CM

## 2025-06-18 DIAGNOSIS — M54.2 CERVICALGIA OF OCCIPITO-ATLANTO-AXIAL REGION: Primary | ICD-10-CM

## 2025-06-18 DIAGNOSIS — K29.80 DUODENITIS: ICD-10-CM

## 2025-06-18 PROCEDURE — 3008F BODY MASS INDEX DOCD: CPT | Mod: CPTII,,, | Performed by: FAMILY MEDICINE

## 2025-06-18 PROCEDURE — 99214 OFFICE O/P EST MOD 30 MIN: CPT | Mod: S$PBB,,, | Performed by: FAMILY MEDICINE

## 2025-06-18 PROCEDURE — 1159F MED LIST DOCD IN RCRD: CPT | Mod: CPTII,,, | Performed by: FAMILY MEDICINE

## 2025-06-18 PROCEDURE — 3080F DIAST BP >= 90 MM HG: CPT | Mod: CPTII,,, | Performed by: FAMILY MEDICINE

## 2025-06-18 PROCEDURE — 99214 OFFICE O/P EST MOD 30 MIN: CPT | Mod: PBBFAC | Performed by: FAMILY MEDICINE

## 2025-06-18 PROCEDURE — 99999 PR PBB SHADOW E&M-EST. PATIENT-LVL IV: CPT | Mod: PBBFAC,,, | Performed by: FAMILY MEDICINE

## 2025-06-18 PROCEDURE — 1160F RVW MEDS BY RX/DR IN RCRD: CPT | Mod: CPTII,,, | Performed by: FAMILY MEDICINE

## 2025-06-18 PROCEDURE — 3077F SYST BP >= 140 MM HG: CPT | Mod: CPTII,,, | Performed by: FAMILY MEDICINE

## 2025-06-18 RX ORDER — METRONIDAZOLE 500 MG/1
500 TABLET ORAL EVERY 12 HOURS
Qty: 14 TABLET | Refills: 0 | Status: SHIPPED | OUTPATIENT
Start: 2025-06-18 | End: 2025-06-25

## 2025-06-18 RX ORDER — CELECOXIB 200 MG/1
200 CAPSULE ORAL DAILY
Qty: 30 CAPSULE | Refills: 3 | Status: SHIPPED | OUTPATIENT
Start: 2025-06-18

## 2025-06-18 NOTE — PROGRESS NOTES
History of Present Illness    CHIEF COMPLAINT:  Patient presents today for knee pain and weakness.    MUSCULOSKELETAL:  She reports left knee pain for a few weeks, localizing to the kneecap area. Pain worsens with squatting, particularly when attempting to squat quickly. She denies taking any pain medications as the pain has not been severe enough to warrant use. She also reports neck discomfort and requires a higher pillow for adequate neck support while sleeping, whereas she previously preferred thinner pillows. She previously received neck exercises during physical therapy sessions for back issues.    GASTROINTESTINAL:  She reports ongoing stomach issues of unclear etiology despite endoscopy evaluation. She previously used ibuprofen for abdominal pain but discontinued due to concerns about worsening acid-related symptoms.      ROS:  General: -fever, -chills, -fatigue, -weight gain, -weight loss  Eyes: -vision changes, -redness, -discharge  ENT: -ear pain, -nasal congestion, -sore throat  Cardiovascular: -chest pain, -palpitations, -lower extremity edema  Respiratory: -cough, -shortness of breath  Gastrointestinal: -abdominal pain, -nausea, -vomiting, -diarrhea, -constipation, -blood in stool  Genitourinary: -dysuria, -hematuria, -frequency  Musculoskeletal: +joint pain, -muscle pain, +muscle weakness, +pain with movement, +limb pain, +neck pain  Skin: -rash, -lesion  Neurological: -headache, -dizziness, -numbness, -tingling  Psychiatric: -anxiety, -depression, -sleep difficulty       Physical Exam    General: No acute distress. Well-developed. Well-nourished.  Eyes: EOMI. Sclerae anicteric.  HENT: Normocephalic. Atraumatic. Nares patent. Moist oral mucosa.  Ears: Bilateral TMs clear. Bilateral EACs clear.  Cardiovascular: Regular rate. Regular rhythm. No murmurs. No rubs. No gallops. Normal S1, S2.  Respiratory: Normal respiratory effort. Clear to auscultation bilaterally. No rales. No rhonchi. No  wheezing.  Abdomen: Soft. Non-tender. Non-distended. Normoactive bowel sounds.  Musculoskeletal: No  obvious deformity. Pain with palpation of chest.  Extremities: No lower extremity edema.  Neurological: Alert & oriented x3. No slurred speech. Normal gait.  Psychiatric: Normal mood. Normal affect. Good insight. Good judgment.  Skin: Warm. Dry. No rash.  MSK: Knee - Left: Abnormal knee alignment.  MSK: Spine - Cervical: Good range of motion in neck.       Assessment & Plan    M22.42 Chondromalacia patellae, left knee  M54.2 Cervicalgia  M54.9 Dorsalgia, unspecified  R07.89 Other chest pain  K30 Functional dyspepsia    CHONDROMALACIA PATELLAE, LEFT KNEE:  - Assessed knee pain, likely chondromalacia patellae based on symptoms and exam.  - Patient reports pain when squatting or picking up objects, specifically located at the patella.  - Range of motion is good, with pain occurring only during certain motions rather than constantly.  - Explained that chondromalacia patellae occurs when the patella slides out of its groove during squatting, leading to soreness.  - Instructed patient to perform straight leg knee raise exercises to strengthen quadriceps, which will help stabilize the patella.  - Advised caution when kneeling down or squatting, especially when caring for grandmother.  - Recommend elevating legs during sleep for relief.  - Ordered XR Left Knee and referred to physical therapy.    CERVICALGIA (NECK PAIN):  - Evaluated neck discomfort, noting good range of motion.  - Patient reports neck pain associated with straining while helping dress and lift grandmother.  - Patient has been performing exercises from therapy for the neck.  - Discussed the importance of proper body mechanics when caring for elderly individuals to avoid strain on bones and joints.  - Recommend using a slightly higher pillow to support neck while sleeping.  - Referred to physical therapy for continued management.    DORSALGIA (BACK PAIN):  -  Patient reports back pain but notes improvement following therapy.  - Has been performing prescribed exercises for the back.  - Discussed continued therapy exercises and reinforced proper body mechanics when caring for elderly individuals.  - Referred to physical therapy to address back issues along with knee and neck concerns.    CHEST PAIN:  - Noted patient reports chest pain upon palpation.  - Range of motion is good, with chest pain occurring only during certain motions rather than constantly or keeping the patient awake.    FUNCTIONAL DYSPEPSIA (STOMACH ISSUES):  - Reviewed recent stomach scope results, which were normal with benign biopsy, soft abdomen, good single nodule, and no gastric inflammation.  - Noted patient has stopped taking ibuprofen due to its acidic nature, which could exacerbate stomach issues.  - Considered patient's history of stomach issues when determining treatment options.    FOLLOW-UP:  - Prescribed Celebrex (daily) for joint pain and inflammation across multiple conditions (knee, neck, and back), noting it is safer on the stomach than other anti-inflammatories.  - Instructed patient to discontinue medication and report if stomach issues occur.  - Ordered CBC and lipid panel.

## 2025-06-23 ENCOUNTER — HOSPITAL ENCOUNTER (OUTPATIENT)
Dept: RADIOLOGY | Facility: HOSPITAL | Age: 30
Discharge: HOME | End: 2025-06-23
Attending: FAMILY MEDICINE
Payer: MEDICAID

## 2025-06-23 ENCOUNTER — TELEPHONE (OUTPATIENT)
Dept: FAMILY MEDICINE | Facility: CLINIC | Age: 30
End: 2025-06-23
Payer: MEDICAID

## 2025-06-23 DIAGNOSIS — M94.262 CHONDROMALACIA, LEFT KNEE: Primary | ICD-10-CM

## 2025-06-23 DIAGNOSIS — M94.262 CHONDROMALACIA, LEFT KNEE: ICD-10-CM

## 2025-06-23 PROCEDURE — 73562 X-RAY EXAM OF KNEE 3: CPT | Mod: 26,LT,, | Performed by: RADIOLOGY

## 2025-06-23 PROCEDURE — 73562 X-RAY EXAM OF KNEE 3: CPT | Mod: TC,LT

## 2025-06-23 NOTE — TELEPHONE ENCOUNTER
Diagnoses and all orders for this visit:    Chondromalacia, left knee  -     X-Ray Knee 3 View Left; Future

## 2025-06-24 ENCOUNTER — RESULTS FOLLOW-UP (OUTPATIENT)
Dept: FAMILY MEDICINE | Facility: CLINIC | Age: 30
End: 2025-06-24

## 2025-06-24 DIAGNOSIS — M54.2 CERVICALGIA: Primary | ICD-10-CM

## 2025-06-26 ENCOUNTER — HOSPITAL ENCOUNTER (OUTPATIENT)
Dept: RADIOLOGY | Facility: HOSPITAL | Age: 30
Discharge: HOME | End: 2025-06-26
Attending: FAMILY MEDICINE
Payer: MEDICAID

## 2025-06-26 DIAGNOSIS — M94.262 CHONDROMALACIA, LEFT KNEE: ICD-10-CM

## 2025-06-26 DIAGNOSIS — M54.2 CERVICALGIA OF OCCIPITO-ATLANTO-AXIAL REGION: ICD-10-CM

## 2025-06-26 PROCEDURE — 72050 X-RAY EXAM NECK SPINE 4/5VWS: CPT | Mod: 26,,, | Performed by: RADIOLOGY

## 2025-06-26 PROCEDURE — 72050 X-RAY EXAM NECK SPINE 4/5VWS: CPT | Mod: TC

## 2025-06-26 PROCEDURE — 73562 X-RAY EXAM OF KNEE 3: CPT | Mod: TC,50

## 2025-06-26 PROCEDURE — 73562 X-RAY EXAM OF KNEE 3: CPT | Mod: 26,50,, | Performed by: RADIOLOGY

## 2025-06-27 ENCOUNTER — RESULTS FOLLOW-UP (OUTPATIENT)
Dept: INTERNAL MEDICINE | Facility: CLINIC | Age: 30
End: 2025-06-27